# Patient Record
Sex: MALE | Race: WHITE | NOT HISPANIC OR LATINO | Employment: OTHER | URBAN - METROPOLITAN AREA
[De-identification: names, ages, dates, MRNs, and addresses within clinical notes are randomized per-mention and may not be internally consistent; named-entity substitution may affect disease eponyms.]

---

## 2017-01-02 ENCOUNTER — APPOINTMENT (OUTPATIENT)
Dept: CT IMAGING | Facility: HOSPITAL | Age: 52
End: 2017-01-02

## 2017-01-02 ENCOUNTER — APPOINTMENT (OUTPATIENT)
Dept: CARDIOLOGY | Facility: HOSPITAL | Age: 52
End: 2017-01-02
Attending: HOSPITALIST

## 2017-01-02 ENCOUNTER — HOSPITAL ENCOUNTER (OUTPATIENT)
Facility: HOSPITAL | Age: 52
Setting detail: OBSERVATION
LOS: 1 days | Discharge: HOME | End: 2017-01-03
Attending: EMERGENCY MEDICINE | Admitting: HOSPITALIST

## 2017-01-02 ENCOUNTER — APPOINTMENT (OUTPATIENT)
Dept: GENERAL RADIOLOGY | Facility: HOSPITAL | Age: 52
End: 2017-01-02

## 2017-01-02 DIAGNOSIS — I26.99 BILATERAL PULMONARY EMBOLISM (HCC): Primary | ICD-10-CM

## 2017-01-02 LAB
ALBUMIN SERPL-MCNC: 4.1 G/DL (ref 3.5–5.2)
ALBUMIN/GLOB SERPL: 1.3 G/DL
ALP SERPL-CCNC: 115 U/L (ref 39–117)
ALT SERPL W P-5'-P-CCNC: 30 U/L (ref 1–41)
ANION GAP SERPL CALCULATED.3IONS-SCNC: 13.6 MMOL/L
AST SERPL-CCNC: 19 U/L (ref 1–40)
BASOPHILS # BLD AUTO: 0.05 10*3/MM3 (ref 0–0.2)
BASOPHILS NFR BLD AUTO: 0.4 % (ref 0–1.5)
BILIRUB SERPL-MCNC: 1.3 MG/DL (ref 0.1–1.2)
BUN BLD-MCNC: 14 MG/DL (ref 6–20)
BUN/CREAT SERPL: 15.1 (ref 7–25)
CALCIUM SPEC-SCNC: 9.4 MG/DL (ref 8.6–10.5)
CHLORIDE SERPL-SCNC: 98 MMOL/L (ref 98–107)
CO2 SERPL-SCNC: 26.4 MMOL/L (ref 22–29)
CREAT BLD-MCNC: 0.93 MG/DL (ref 0.76–1.27)
D DIMER PPP FEU-MCNC: 2.42 MCGFEU/ML (ref 0–0.49)
DEPRECATED RDW RBC AUTO: 42.8 FL (ref 37–54)
EOSINOPHIL # BLD AUTO: 0.25 10*3/MM3 (ref 0–0.7)
EOSINOPHIL NFR BLD AUTO: 2 % (ref 0.3–6.2)
ERYTHROCYTE [DISTWIDTH] IN BLOOD BY AUTOMATED COUNT: 12.8 % (ref 11.5–14.5)
GFR SERPL CREATININE-BSD FRML MDRD: 85 ML/MIN/1.73
GLOBULIN UR ELPH-MCNC: 3.1 GM/DL
GLUCOSE BLD-MCNC: 102 MG/DL (ref 65–99)
HCT VFR BLD AUTO: 47.6 % (ref 40.4–52.2)
HGB BLD-MCNC: 16.2 G/DL (ref 13.7–17.6)
HOLD SPECIMEN: NORMAL
HOLD SPECIMEN: NORMAL
IMM GRANULOCYTES # BLD: 0.11 10*3/MM3 (ref 0–0.03)
IMM GRANULOCYTES NFR BLD: 0.9 % (ref 0–0.5)
LYMPHOCYTES # BLD AUTO: 2.03 10*3/MM3 (ref 0.9–4.8)
LYMPHOCYTES NFR BLD AUTO: 16.1 % (ref 19.6–45.3)
MCH RBC QN AUTO: 31.3 PG (ref 27–32.7)
MCHC RBC AUTO-ENTMCNC: 34 G/DL (ref 32.6–36.4)
MCV RBC AUTO: 91.9 FL (ref 79.8–96.2)
MONOCYTES # BLD AUTO: 0.99 10*3/MM3 (ref 0.2–1.2)
MONOCYTES NFR BLD AUTO: 7.8 % (ref 5–12)
NEUTROPHILS # BLD AUTO: 9.21 10*3/MM3 (ref 1.9–8.1)
NEUTROPHILS NFR BLD AUTO: 72.8 % (ref 42.7–76)
NRBC BLD MANUAL-RTO: 0 /100 WBC (ref 0–0)
NT-PROBNP SERPL-MCNC: 21.8 PG/ML (ref 0–900)
PLATELET # BLD AUTO: 189 10*3/MM3 (ref 140–500)
PMV BLD AUTO: 9.7 FL (ref 6–12)
POTASSIUM BLD-SCNC: 3.9 MMOL/L (ref 3.5–5.2)
PROT SERPL-MCNC: 7.2 G/DL (ref 6–8.5)
RBC # BLD AUTO: 5.18 10*6/MM3 (ref 4.6–6)
SODIUM BLD-SCNC: 138 MMOL/L (ref 136–145)
TROPONIN T SERPL-MCNC: <0.01 NG/ML (ref 0–0.03)
WBC NRBC COR # BLD: 12.64 10*3/MM3 (ref 4.5–10.7)
WHOLE BLOOD HOLD SPECIMEN: NORMAL
WHOLE BLOOD HOLD SPECIMEN: NORMAL

## 2017-01-02 PROCEDURE — 84484 ASSAY OF TROPONIN QUANT: CPT | Performed by: EMERGENCY MEDICINE

## 2017-01-02 PROCEDURE — 80053 COMPREHEN METABOLIC PANEL: CPT | Performed by: EMERGENCY MEDICINE

## 2017-01-02 PROCEDURE — 71020 HC CHEST PA AND LATERAL: CPT

## 2017-01-02 PROCEDURE — G0378 HOSPITAL OBSERVATION PER HR: HCPCS

## 2017-01-02 PROCEDURE — 85025 COMPLETE CBC W/AUTO DIFF WBC: CPT | Performed by: EMERGENCY MEDICINE

## 2017-01-02 PROCEDURE — 83880 ASSAY OF NATRIURETIC PEPTIDE: CPT | Performed by: EMERGENCY MEDICINE

## 2017-01-02 PROCEDURE — 71275 CT ANGIOGRAPHY CHEST: CPT

## 2017-01-02 PROCEDURE — 99285 EMERGENCY DEPT VISIT HI MDM: CPT

## 2017-01-02 PROCEDURE — 0 IOPAMIDOL PER 1 ML: Performed by: EMERGENCY MEDICINE

## 2017-01-02 PROCEDURE — 93970 EXTREMITY STUDY: CPT

## 2017-01-02 PROCEDURE — 93005 ELECTROCARDIOGRAM TRACING: CPT

## 2017-01-02 PROCEDURE — 85379 FIBRIN DEGRADATION QUANT: CPT | Performed by: EMERGENCY MEDICINE

## 2017-01-02 PROCEDURE — 93010 ELECTROCARDIOGRAM REPORT: CPT | Performed by: INTERNAL MEDICINE

## 2017-01-02 PROCEDURE — 25010000002 KETOROLAC TROMETHAMINE PER 15 MG: Performed by: EMERGENCY MEDICINE

## 2017-01-02 PROCEDURE — 96374 THER/PROPH/DIAG INJ IV PUSH: CPT

## 2017-01-02 RX ORDER — ACETAMINOPHEN 325 MG/1
650 TABLET ORAL EVERY 4 HOURS PRN
Status: DISCONTINUED | OUTPATIENT
Start: 2017-01-02 | End: 2017-01-03 | Stop reason: HOSPADM

## 2017-01-02 RX ORDER — CYCLOBENZAPRINE HCL 10 MG
10 TABLET ORAL 3 TIMES DAILY PRN
Status: DISCONTINUED | OUTPATIENT
Start: 2017-01-02 | End: 2017-01-03 | Stop reason: HOSPADM

## 2017-01-02 RX ORDER — ONDANSETRON 4 MG/1
4 TABLET, FILM COATED ORAL EVERY 6 HOURS PRN
Status: DISCONTINUED | OUTPATIENT
Start: 2017-01-02 | End: 2017-01-03 | Stop reason: HOSPADM

## 2017-01-02 RX ORDER — SODIUM CHLORIDE 0.9 % (FLUSH) 0.9 %
1-10 SYRINGE (ML) INJECTION AS NEEDED
Status: DISCONTINUED | OUTPATIENT
Start: 2017-01-02 | End: 2017-01-03 | Stop reason: HOSPADM

## 2017-01-02 RX ORDER — CYCLOBENZAPRINE HCL 10 MG
10 TABLET ORAL 3 TIMES DAILY PRN
COMMUNITY
End: 2018-10-30

## 2017-01-02 RX ORDER — SODIUM CHLORIDE 0.9 % (FLUSH) 0.9 %
10 SYRINGE (ML) INJECTION AS NEEDED
Status: DISCONTINUED | OUTPATIENT
Start: 2017-01-02 | End: 2017-01-03 | Stop reason: HOSPADM

## 2017-01-02 RX ORDER — HYDROCODONE BITARTRATE AND ACETAMINOPHEN 10; 325 MG/1; MG/1
1 TABLET ORAL EVERY 6 HOURS PRN
Status: DISCONTINUED | OUTPATIENT
Start: 2017-01-02 | End: 2017-01-03 | Stop reason: HOSPADM

## 2017-01-02 RX ORDER — HYDROCODONE BITARTRATE AND ACETAMINOPHEN 10; 325 MG/1; MG/1
1 TABLET ORAL EVERY 6 HOURS PRN
COMMUNITY
End: 2018-10-30

## 2017-01-02 RX ORDER — ONDANSETRON 2 MG/ML
4 INJECTION INTRAMUSCULAR; INTRAVENOUS EVERY 6 HOURS PRN
Status: DISCONTINUED | OUTPATIENT
Start: 2017-01-02 | End: 2017-01-03 | Stop reason: HOSPADM

## 2017-01-02 RX ORDER — KETOROLAC TROMETHAMINE 30 MG/ML
30 INJECTION, SOLUTION INTRAMUSCULAR; INTRAVENOUS ONCE
Status: COMPLETED | OUTPATIENT
Start: 2017-01-02 | End: 2017-01-02

## 2017-01-02 RX ORDER — ONDANSETRON 4 MG/1
4 TABLET, ORALLY DISINTEGRATING ORAL EVERY 6 HOURS PRN
Status: DISCONTINUED | OUTPATIENT
Start: 2017-01-02 | End: 2017-01-03 | Stop reason: HOSPADM

## 2017-01-02 RX ORDER — NITROGLYCERIN 0.4 MG/1
0.4 TABLET SUBLINGUAL
Status: DISCONTINUED | OUTPATIENT
Start: 2017-01-02 | End: 2017-01-03 | Stop reason: HOSPADM

## 2017-01-02 RX ADMIN — IOPAMIDOL 95 ML: 755 INJECTION, SOLUTION INTRAVENOUS at 10:44

## 2017-01-02 RX ADMIN — KETOROLAC TROMETHAMINE 30 MG: 30 INJECTION, SOLUTION INTRAMUSCULAR at 09:33

## 2017-01-02 RX ADMIN — RIVAROXABAN 20 MG: 20 TABLET, FILM COATED ORAL at 11:47

## 2017-01-02 NOTE — ED NOTES
Pt comments,complaints,concerns addressed at this time  Pt in bed with side rail up  Call light within reach  Room and pt assessed for safety concerns  Pt resting, NAD ATT  Pt informed/updated of status  Vitals assessed, continue to monitor       Yanet Boykin RN  01/02/17 4264

## 2017-01-02 NOTE — ED NOTES
PT IS A HOLD AT THIS TIME, NO ADMIT ORDERS AT THIS TIME, PT PLACED IN HOSPTIAL BED     Yanet Boykin RN  01/02/17 1241       Yanet Boykin RN  01/02/17 1241

## 2017-01-02 NOTE — ED NOTES
Patient to er from home with c/o left rib pain with deep breathing, started three days ago and getting worse.      Flip Reyes RN  01/02/17 0803

## 2017-01-02 NOTE — ED NOTES
Pt states pain in left rib cage/left side that started Friday, not any better, denies SOA or other complaints   Pt states left shoulder surgery on nov 29     Yanet Boykin RN  01/02/17 0935       Yanet Boykin RN  01/02/17 0937

## 2017-01-02 NOTE — ED PROVIDER NOTES
EMERGENCY DEPARTMENT ENCOUNTER    CHIEF COMPLAINT  Chief Complaint: Chest wall pain  History given by: Pt  History limited by: N/A  Room Number: 31/31  PMD: Raeann Becerra MD      HPI:  Pt is a 51 y.o. male who presents complaining of worsening constant L sided chest wall pain onset 3 days ago, which worsens with deep breathing and certain movements. He is concerned for fractured ribs. Pt reports L shoulder surgery performed by Dr. Duvall on Nov 29th with ongoing leg swelling since that time. He denies smoking. Pt denies vomiting, diarrhea, and SOB.    Duration:  3 days  Onset: Gradual  Timing: Constant  Location: L chest wall  Radiation: None  Quality: Aching  Intensity/Severity: Moderate  Progression: Worsening  Associated Symptoms: None  Aggravating Factors: Deep breathing, certain movements  Alleviating Factors: Nothing  Previous Episodes: None specified  Treatment before arrival: None specified    PAST MEDICAL HISTORY  Active Ambulatory Problems     Diagnosis Date Noted   • No Active Ambulatory Problems     Resolved Ambulatory Problems     Diagnosis Date Noted   • No Resolved Ambulatory Problems     No Additional Past Medical History       PAST SURGICAL HISTORY  Past Surgical History   Procedure Laterality Date   • Shoulder surgery Left    • Cholecystectomy         FAMILY HISTORY  History reviewed. No pertinent family history.    SOCIAL HISTORY  Social History     Social History   • Marital status: Single     Spouse name: N/A   • Number of children: N/A   • Years of education: N/A     Occupational History   • Not on file.     Social History Main Topics   • Smoking status: Never Smoker   • Smokeless tobacco: Not on file   • Alcohol use No   • Drug use: No   • Sexual activity: Not on file     Other Topics Concern   • Not on file     Social History Narrative   • No narrative on file       ALLERGIES  Review of patient's allergies indicates no known allergies.    REVIEW OF SYSTEMS  Review of Systems    Constitutional: Negative for chills and fever.   HENT: Negative for sore throat and trouble swallowing.    Eyes: Negative for visual disturbance.   Respiratory: Negative for cough and shortness of breath.    Cardiovascular: Positive for chest pain (L chest wall pain). Negative for leg swelling.   Gastrointestinal: Negative for abdominal pain, diarrhea, nausea and vomiting.   Endocrine: Negative.    Genitourinary: Negative for decreased urine volume and frequency.   Musculoskeletal: Negative for neck pain.   Skin: Negative for rash.   Allergic/Immunologic: Negative.    Neurological: Negative for weakness and numbness.   Hematological: Negative.    Psychiatric/Behavioral: Negative.    All other systems reviewed and are negative.      PHYSICAL EXAM  ED Triage Vitals   Temp Heart Rate Resp BP SpO2   01/02/17 0740 01/02/17 0740 01/02/17 0740 01/02/17 0803 01/02/17 0740   99 °F (37.2 °C) 83 16 138/92 99 %      Temp src Heart Rate Source Patient Position BP Location FiO2 (%)   01/02/17 0740 01/02/17 0740 01/02/17 0803 -- --   Tympanic Monitor Sitting         Physical Exam   Constitutional: He is oriented to person, place, and time and well-developed, well-nourished, and in no distress.   HENT:   Head: Normocephalic and atraumatic.   Eyes: EOM are normal. Pupils are equal, round, and reactive to light.   Neck: Normal range of motion. Neck supple.   Cardiovascular: Normal rate, regular rhythm and normal heart sounds.    Pulmonary/Chest: Effort normal and breath sounds normal. No respiratory distress.   Abdominal: Soft. There is no tenderness. There is no rebound and no guarding.   Musculoskeletal: Normal range of motion. He exhibits edema (1+ edema to bilateral lower extremities).   Pt is post-op L shoulder, he has pain with movement   Neurological: He is alert and oriented to person, place, and time. He has normal sensation and normal strength.   Skin: Skin is warm and dry.   Psychiatric: Mood and affect normal.   Nursing  note and vitals reviewed.      LAB RESULTS  Lab Results (last 24 hours)     Procedure Component Value Units Date/Time    CBC & Differential [[id number]] Collected:  01/02/17 0932    Specimen:  Blood Updated:  01/02/17 1023    Narrative:       The following orders were created for panel order CBC & Differential.  Procedure                               Abnormality         Status                     ---------                               -----------         ------                     CBC Auto Differential[[id number]]         Abnormal            Final result                 Please view results for these tests on the individual orders.    Comprehensive Metabolic Panel [[id number]]  (Abnormal) Collected:  01/02/17 0932    Specimen:  Blood Updated:  01/02/17 1011     Glucose 102 (H) mg/dL      BUN 14 mg/dL      Creatinine 0.93 mg/dL      Sodium 138 mmol/L      Potassium 3.9 mmol/L      Chloride 98 mmol/L      CO2 26.4 mmol/L      Calcium 9.4 mg/dL      Total Protein 7.2 g/dL      Albumin 4.10 g/dL      ALT (SGPT) 30 U/L      AST (SGOT) 19 U/L      Alkaline Phosphatase 115 U/L      Total Bilirubin 1.3 (H) mg/dL      eGFR Non African Amer 85 mL/min/1.73      Globulin 3.1 gm/dL      A/G Ratio 1.3 g/dL      BUN/Creatinine Ratio 15.1      Anion Gap 13.6 mmol/L     BNP [[id number]]  (Normal) Collected:  01/02/17 0932    Specimen:  Blood Updated:  01/02/17 1009     proBNP 21.8 pg/mL     Narrative:       Among patients with dyspnea, NT-proBNP is highly sensitive for the detection of acute congestive heart failure. In addition NT-proBNP of <300 pg/ml effectively rules out acute congestive heart failure with 99% negative predictive value.    Troponin [[id number]]  (Normal) Collected:  01/02/17 0932    Specimen:  Blood Updated:  01/02/17 1011     Troponin T <0.010 ng/mL     Narrative:       Troponin T Reference Ranges:  Less than 0.03 ng/mL:    Negative for AMI  0.03 to 0.09 ng/mL:      Indeterminant for AMI  Greater than 0.09 ng/mL:  Positive for AMI    CBC Auto Differential [[id number]]  (Abnormal) Collected:  01/02/17 0932    Specimen:  Blood Updated:  01/02/17 1023     WBC 12.64 (H) 10*3/mm3      RBC 5.18 10*6/mm3      Hemoglobin 16.2 g/dL      Hematocrit 47.6 %      MCV 91.9 fL      MCH 31.3 pg      MCHC 34.0 g/dL      RDW 12.8 %      RDW-SD 42.8 fl      MPV 9.7 fL      Platelets 189 10*3/mm3      Neutrophil % 72.8 %      Lymphocyte % 16.1 (L) %      Monocyte % 7.8 %      Eosinophil % 2.0 %      Basophil % 0.4 %      Immature Grans % 0.9 (H) %      Neutrophils, Absolute 9.21 (H) 10*3/mm3      Lymphocytes, Absolute 2.03 10*3/mm3      Monocytes, Absolute 0.99 10*3/mm3      Eosinophils, Absolute 0.25 10*3/mm3      Basophils, Absolute 0.05 10*3/mm3      Immature Grans, Absolute 0.11 (H) 10*3/mm3      nRBC 0.0 /100 WBC     D-dimer, Quantitative [[id number]]  (Abnormal) Collected:  01/02/17 0932    Specimen:  Blood Updated:  01/02/17 1003     D-Dimer, Quantitative 2.42 (H) MCGFEU/mL           I ordered the above labs and reviewed the results    RADIOLOGY  XR Chest 2 View    (Results Pending)   CT Angiogram Chest With Contrast    (Results Pending)   CTA Chest:  Shows bilateral PEs. RV to LV ratio less than 1     I ordered the above noted radiological studies. Interpreted by radiologist. Discussed with radiologist (Dr. Braswell).     EKG         EKG time: 7:47 AM  Rhythm/Rate: NSR at 89 bpm  P waves and OH: Normal  QRS, axis: Normal  ST and T waves: Normal  Interpreted contemporaneously by me and independently viewed.  Unchanged compared to prior (12/29/04).    PROCEDURES  Procedures      PROGRESS AND CONSULTS  ED Course   Comment By Time   1:03 PM  [facility] [place] so unable to update until now.  Patient with pleuritic chest pain.  Has bilateral PEs.  Does not appear submassive as RV/LV ratio is normal and biomarkers are normal.  Discussed with Dr. Brody who will admit. Nilson Flynn MD 01/02 1304   9:01 AM:  Vitals: BP: 138/92 HR: 83 Temp: 99 °F  (37.2 °C) (Tympanic) O2 sat: 99%  D/w pt plan for labs and CXR for further evaluation. Discussed results of EKG, which was normal and unchanged from 2004. Ordered Toradol for pain management. Pt understands and agrees with the plan, all questions answered.    10:30 AM:  Ordered CTA Chest for further evaluation.    10:50 AM:  Vitals: BP: 138/92 HR: 81 Temp: 99 °F (37.2 °C) (Tympanic) O2 sat: 91%  Rechecked pt. Pt is resting comfortably. Discussed results of imaging, which showed bilateral PEs, and the need for admission for further care. Pt understands and agrees with the plan, all questions answered.    11:19 AM:  Discussed pt's case with Dr. Brody ([facility]), who agrees to admit the pt for further care.    EPIC is not functioning properly. I am unable to view imaging and labs.    MEDICAL DECISION MAKING  Results were reviewed/discussed with the patient and they were also made aware of online access. Pt also made aware that some labs, such as cultures, will not be resulted during ER visit and follow up with PMD is necessary.     MDM  Number of Diagnoses or Management Options     Amount and/or Complexity of Data Reviewed  Clinical lab tests: ordered and reviewed  Tests in the radiology section of CPT®: ordered and reviewed  Tests in the medicine section of CPT®: ordered and reviewed  Discussion of test results with the performing providers: yes  Discuss the patient with other providers: yes  Independent visualization of images, tracings, or specimens: yes    Patient Progress  Patient progress: stable         DIAGNOSIS  Final diagnoses:   Bilateral pulmonary embolism       DISPOSITION  ADMISSION    Discussed treatment plan and reason for admission with pt/family and admitting physician.  Pt/family voiced understanding of the plan for admission for further testing/treatment as needed.     Latest Documented Vital Signs:  As of 12:06 PM  BP- 121/90 HR- 80 Temp- 99 °F (37.2 °C) (Tympanic) O2 sat- 97%    --  Documentation  assistance provided by roxana Mckay for Dr. Flynn.  Information recorded by the scribe was done at my direction and has been verified and validated by me.     Yuan Mckay  01/02/17 1561       Nilson Flynn MD  01/02/17 0170

## 2017-01-02 NOTE — IP AVS SNAPSHOT
AFTER VISIT SUMMARY             Oliverio Corrales           About your hospitalization     You were admitted on:  January 2, 2017 You last received care in the:  [address]       Procedures & Surgeries         Medications    If you or your caregiver advised us that you are currently taking a medication and that medication is marked below as “Resume”, this simply indicates that we have reviewed those medications to make sure our new therapy recommendations do not interfere.  If you have concerns about medications other than those new ones which we are prescribing today, please consult the physician who prescribed them (or your primary physician).  Our review of your home medications is not meant to indicate that we are directing their use.             Your Medications      START taking these medications     rivaroxaban 15 MG tablet   Take 1 tablet by mouth 2 (Two) Times a Day With Meals for 20 days.   Last time this was given:  1/3/2017  8:28 AM   Commonly known as:  XARELTO           rivaroxaban 20 MG tablet   Take 1 tablet by mouth Daily With Dinner.   Last time this was given:  1/3/2017  8:28 AM   Commonly known as:  XARELTO   Start taking on:  1/23/2017   Notes to Patient:  Start taking this on January 23             CONTINUE taking these medications     cyclobenzaprine 10 MG tablet   Take 10 mg by mouth 3 (Three) Times a Day As Needed for muscle spasms.   Last time this was given:  1/3/2017 12:33 AM   Commonly known as:  FLEXERIL           HYDROcodone-acetaminophen  MG per tablet   Take 1 tablet by mouth Every 6 (Six) Hours As Needed for moderate pain (4-6).   Last time this was given:  1/3/2017 12:33 AM   Commonly known as:  NORCO                Where to Get Your Medications      You can get these medications from any pharmacy     Bring a paper prescription for each of these medications     rivaroxaban 15 MG tablet    rivaroxaban 20 MG tablet                  Your Medications         Your Medication List           Morning Noon Evening Bedtime As Needed    cyclobenzaprine 10 MG tablet   Take 10 mg by mouth 3 (Three) Times a Day As Needed for muscle spasms.   Commonly known as:  FLEXERIL                                   HYDROcodone-acetaminophen  MG per tablet   Take 1 tablet by mouth Every 6 (Six) Hours As Needed for moderate pain (4-6).   Commonly known as:  NORCO                                   * rivaroxaban 15 MG tablet   Take 1 tablet by mouth 2 (Two) Times a Day With Meals for 20 days.   Commonly known as:  XARELTO                       Take this evening               * rivaroxaban 20 MG tablet   Take 1 tablet by mouth Daily With Dinner.   Commonly known as:  XARELTO   Start taking on:  1/23/2017   Notes to Patient:  Start taking this on January 23                                * Notice:  This list has 2 medication(s) that are the same as other medications prescribed for you. Read the directions carefully, and ask your doctor or other care provider to review them with you.             Instructions for After Discharge        Activity Instructions     Activity as Tolerated                 Diet Instructions     Diet: Regular; Thin Liquids, No Restrictions       Discharge Diet:  Regular   Fluid Consistency:  Thin Liquids, No Restrictions             Discharge References/Attachments     RIVAROXABAN ORAL TABLETS (ENGLISH)    PULMONARY EMBOLISM (ENGLISH)    DEEP VEIN THROMBOSIS (ENGLISH)       Follow-ups for After Discharge        Follow-up Information     Follow up with Raeann Becerra MD. Schedule an appointment as soon as possible for a visit in 1 week(s).    Specialty:  Family Medicine    Contact information:    [facility] [address] [address] [phone]        Referrals and Follow-ups to Schedule     Follow-Up    As directed    Keep Orthopedic follow up.   Follow Up Details:  PCP in 1 week.             Michael[facility] Signup     [facility] allows you  to send messages to your doctor, view your test results, renew your prescriptions, schedule appointments, and more. To sign up, go to [email or web address] and click on the Sign Up Now link in the New User? box. Enter your [employer] Activation Code exactly as it appears below along with the last four digits of your Social Security Number and your Date of Birth () to complete the sign-up process. If you do not sign up before the expiration date, you must request a new code.    [employer] Activation Code: [email or web address]  Expires: 2017  8:05 AM    If you have questions, you can email [email or web address] or call [phone] to talk to our [employer] staff. Remember, [employer] is NOT to be used for urgent needs. For medical emergencies, dial 911.           Summary of Your Hospitalization        Reason for Hospitalization     Your primary diagnosis was:  Not on File    Your diagnoses also included:  Blood Clots In Lungs      Care Providers     Provider Service Role Specialty    Alexys Brody MD Internal Medicine Attending Provider Internal Medicine      Your Allergies  Date Reviewed: 2017    No active allergies      Patient Belongings Returned     Document Return of Belongings Flowsheet     Were the patient bedside belongings sent home?   N/A   Belongings Retrieved from Security & Sent Home   N/A    Belongings Sent to Safe   --   Medications Retrieved from Pharmacy & Sent Home   N/A              More Information      Rivaroxaban oral tablets  What is this medicine?  RIVAROXABAN (ri va BENNY a ban) is an anticoagulant (blood thinner). It is used to treat blood clots in the lungs or in the veins. It is also used after knee or hip surgeries to prevent blood clots. It is also used to lower the chance of stroke in people with a medical condition called atrial fibrillation.  This medicine may be used for other purposes; ask your health care provider or pharmacist if you have questions.  What should I tell  my health care provider before I take this medicine?  They need to know if you have any of these conditions:  -bleeding disorders  -bleeding in the brain  -blood in your stools (black or tarry stools) or if you have blood in your vomit  -history of stomach bleeding  -kidney disease  -liver disease  -low blood counts, like low white cell, platelet, or red cell counts  -recent or planned spinal or epidural procedure  -take medicines that treat or prevent blood clots  -an unusual or allergic reaction to rivaroxaban, other medicines, foods, dyes, or preservatives  -pregnant or trying to get pregnant  -breast-feeding  How should I use this medicine?  Take this medicine by mouth with a glass of water. Follow the directions on the prescription label. Take your medicine at regular intervals. Do not take it more often than directed. Do not stop taking except on your doctor's advice. Stopping this medicine may increase your risk of a blood clot. Be sure to refill your prescription before you run out of medicine.  If you are taking this medicine after hip or knee replacement surgery, take it with or without food. If you are taking this medicine for atrial fibrillation, take it with your evening meal. If you are taking this medicine to treat blood clots, take it with food at the same time each day. If you are unable to swallow your tablet, you may crush the tablet and mix it in applesauce. Then, immediately eat the applesauce. You should eat more food right after you eat the applesauce containing the crushed tablet.  Talk to your pediatrician regarding the use of this medicine in children. Special care may be needed.  Overdosage: If you think you have taken too much of this medicine contact a poison control center or emergency room at once.  NOTE: This medicine is only for you. Do not share this medicine with others.  What if I miss a dose?  If you take your medicine once a day and miss a dose, take the missed dose as soon as  you remember. If you take your medicine twice a day and miss a dose, take the missed dose immediately. In this instance, 2 tablets may be taken at the same time. The next day you should take 1 tablet twice a day as directed.  What may interact with this medicine?  -aspirin and aspirin-like medicines  -certain antibiotics like erythromycin, azithromycin, and clarithromycin  -certain medicines for fungal infections like ketoconazole and itraconazole  -certain medicines for irregular heart beat like amiodarone, quinidine, dronedarone  -certain medicines for seizures like carbamazepine, phenytoin  -certain medicines that treat or prevent blood clots like warfarin, enoxaparin, and dalteparin  -conivaptan  -diltiazem  -felodipine  -indinavir  -lopinavir; ritonavir  -NSAIDS, medicines for pain and inflammation, like ibuprofen or naproxen  -ranolazine  -rifampin  -ritonavir  -Andrew's wort  -verapamil  This list may not describe all possible interactions. Give your health care provider a list of all the medicines, herbs, non-prescription drugs, or dietary supplements you use. Also tell them if you smoke, drink alcohol, or use illegal drugs. Some items may interact with your medicine.  What should I watch for while using this medicine?  Visit your doctor or health care professional for regular checks on your progress. Your condition will be monitored carefully while you are receiving this medicine.  Notify your doctor or health care professional and seek emergency treatment if you develop breathing problems; changes in vision; chest pain; severe, sudden headache; pain, swelling, warmth in the leg; trouble speaking; sudden numbness or weakness of the face, arm, or leg. These can be signs that your condition has gotten worse.  If you are going to have surgery, tell your doctor or health care professional that you are taking this medicine.  Tell your health care professional that you use this medicine before you have a spinal  or epidural procedure. Sometimes people who take this medicine have bleeding problems around the spine when they have a spinal or epidural procedure. This bleeding is very rare. If you have a spinal or epidural procedure while on this medicine, call your health care professional immediately if you have back pain, numbness or tingling (especially in your legs and feet), muscle weakness, paralysis, or loss of bladder or bowel control.  Avoid sports and activities that might cause injury while you are using this medicine. Severe falls or injuries can cause unseen bleeding. Be careful when using sharp tools or knives. Consider using an electric razor. Take special care brushing or flossing your teeth. Report any injuries, bruising, or red spots on the skin to your doctor or health care professional.  What side effects may I notice from receiving this medicine?  Side effects that you should report to your doctor or health care professional as soon as possible:  -allergic reactions like skin rash, itching or hives, swelling of the face, lips, or tongue  -back pain  -redness, blistering, peeling or loosening of the skin, including inside the mouth  -signs and symptoms of bleeding such as bloody or black, tarry stools; red or dark-brown urine; spitting up blood or brown material that looks like coffee grounds; red spots on the skin; unusual bruising or bleeding from the eye, gums, or nose  Side effects that usually do not require medical attention (Report these to your doctor or health care professional if they continue or are bothersome.):  -dizziness  -muscle pain  This list may not describe all possible side effects. Call your doctor for medical advice about side effects. You may report side effects to FDA at 2-557-FDA-0479.  Where should I keep my medicine?  Keep out of the reach of children.  Store at room temperature between 15 and 30 degrees C (59 and 86 degrees F). Throw away any unused medicine after the expiration  date.  NOTE: This sheet is a summary. It may not cover all possible information. If you have questions about this medicine, talk to your doctor, pharmacist, or health care provider.     © 2016, [employer]. (2015-12-16 12:45:34)          Pulmonary Embolism  A pulmonary embolism (PE) is a sudden blockage or decrease of blood flow in one lung or both lungs. Most blockages come from a blood clot that travels from the legs or the pelvis to the lungs. PE is a dangerous and potentially life-threatening condition if it is not treated right away.  CAUSES  A pulmonary embolism occurs most commonly when a blood clot travels from one of your veins to your lungs. Rarely, PE is caused by air, fat, amniotic fluid, or part of a tumor traveling through your veins to your lungs.  RISK FACTORS  A PE is more likely to develop in:  · People who smoke.  · People who are older, especially over 60 years of age.  · People who are overweight (obese).  · People who sit or lie still for a long time, such as during long-distance travel (over 4 hours), bed rest, hospitalization, or during recovery from certain medical conditions like a stroke.  · People who do not engage in much physical activity (sedentary lifestyle).  · People who have chronic breathing disorders.  · People who have a personal or family history of blood clots or blood clotting disease.  · People who have peripheral vascular disease (PVD), diabetes, or some types of cancer.  · People who have heart disease, especially if the person had a recent heart attack or has congestive heart failure.  · People who have neurological diseases that affect the legs (leg paresis).  · People who have had a traumatic injury, such as breaking a hip or leg.  · People who have recently had major or lengthy surgery, especially on the hip, knee, or abdomen.  · People who have had a central line placed inside a large vein.  · People who take medicines that contain the hormone estrogen.  These include birth control pills and hormone replacement therapy.  · Pregnancy or during childbirth or the postpartum period.  SIGNS AND SYMPTOMS   The symptoms of a PE usually start suddenly and include:  · Shortness of breath while active or at rest.  · Coughing or coughing up blood or blood-tinged mucus.  · Chest pain that is often worse with deep breaths.  · Rapid or irregular heartbeat.  · Feeling light-headed or dizzy.  · Fainting.  · Feeling anxious.  · Sweating.  There may also be pain and swelling in a leg if that is where the blood clot started.  These symptoms may represent a serious problem that is an emergency. Do not wait to see if the symptoms will go away. Get medical help right away. Call your local emergency services (911 in the [place] Do not drive yourself to the hospital.  DIAGNOSIS  Your health care provider will take a medical history and perform a physical exam. You may also have other tests, including:  · Blood tests to assess the clotting properties of your blood, assess oxygen levels in your blood, and find blood clots.  · Imaging tests, such as CT, ultrasound, MRI, X-ray, and other tests to see if you have clots anywhere in your body.  · An electrocardiogram (ECG) to look for heart strain from blood clots in the lungs.  TREATMENT  The main goals of PE treatment are:  · To stop a blood clot from growing larger.  · To stop new blood clots from forming.  The type of treatment that you receive depends on many factors, such as the cause of your PE, your risk for bleeding or developing more clots, and other medical conditions that you have. Sometimes, a combination of treatments is necessary.  This condition may be treated with:  · Medicines, including newer oral blood thinners (anticoagulants), warfarin, low molecular weight heparins, thrombolytics, or heparins.  · Wearing compression stockings or using different types of devices.  · Surgery (rare) to remove the blood clot or to place a filter  in your abdomen to stop the blood clot from traveling to your lungs.  Treatments for a PE are often divided into immediate treatment, long-term treatment (up to 3 months after PE), and extended treatment (more than 3 months after PE). Your treatment may continue for several months. This is called maintenance therapy, and it is used to prevent the forming of new blood clots. You can work with your health care provider to choose the treatment program that is best for you.  What are anticoagulants?   Anticoagulants are medicines that treat PEs. They can stop current blood clots from growing and stop new clots from forming. They cannot dissolve existing clots. Your body dissolves clots by itself over time. Anticoagulants are given by mouth, by injection, or through an IV tube.  What are thrombolytics?   Thrombolytics are clot-dissolving medicines that are used to dissolve a PE. They carry a high risk of bleeding, so they tend to be used only in severe cases or if you have very low blood pressure.  HOME CARE INSTRUCTIONS  If you are taking a newer oral anticoagulant:   · Take the medicine every single day at the same time each day.  · Understand what foods and drugs interact with this medicine.  · Understand that there are no regular blood tests required when using this medicine.  · Understand the side effects of this medicine, including excessive bruising or bleeding. Ask your health care provider or pharmacist about other possible side effects.  If you are taking warfarin:  · Understand how to take warfarin and know which foods can affect how warfarin works in your body.  · Understand that it is dangerous to take too much or too little warfarin. Too much warfarin increases the risk of bleeding. Too little warfarin continues to allow the risk for blood clots.  · Follow your PT and INR blood testing schedule. The PT and INR results allow your health care provider to adjust your dose of warfarin. It is very important that  you have your PT and INR tested as often as told by your health care provider.  · Avoid major changes in your diet, or tell your health care provider before you change your diet. Arrange a visit with a registered dietitian to answer your questions. Many foods, especially foods that are high in vitamin K, can interfere with warfarin and affect the PT and INR results. Eat a consistent amount of foods that are high in vitamin K, such as:    Spinach, kale, broccoli, cabbage, pete greens, turnip greens, [place] sprouts, peas, cauliflower, seaweed, and parsley.    Beef liver and pork liver.    Green tea.    Soybean oil.  · Tell your health care provider about any and all medicines, vitamins, and supplements that you take, including aspirin and other over-the-counter anti-inflammatory medicines. Be especially cautious with aspirin and anti-inflammatory medicines. Do not take those before you ask your health care provider if it is safe to do so.  This is important because many medicines can interfere with warfarin and affect the PT and INR results.  · Do not start or stop taking any over-the-counter or prescription medicine unless your health care provider or pharmacist tells you to do so.  If you take warfarin, you will also need to do these things:  · Hold pressure over cuts for longer than usual.  · Tell your dentist and other health care providers that you are taking warfarin before you have any procedures in which bleeding may occur.  · Avoid alcohol or drink very small amounts. Tell your health care provider if you change your alcohol intake.  · Do not use tobacco products, including cigarettes, chewing tobacco, and e-cigarettes. If you need help quitting, ask your health care provider.  · Avoid contact sports.  General Instructions  · Take over-the-counter and prescription medicines only as told by your health care provider. Anticoagulant medicines can have side effects, including easy bruising and difficulty  stopping bleeding. If you are prescribed an anticoagulant, you will also need to do these things:    Hold pressure over cuts for longer than usual.    Tell your dentist and other health care providers that you are taking anticoagulants before you have any procedures in which bleeding may occur.    Avoid contact sports.  · Wear a medical alert bracelet or carry a medical alert card that says you have had a PE.  · Ask your health care provider how soon you can go back to your normal activities. Stay active to prevent new blood clots from forming.  · Make sure to exercise while traveling or when you have been sitting or standing for a long period of time. It is very important to exercise. Exercise your legs by walking or by tightening and relaxing your leg muscles often. Take frequent walks.  · Wear compression stockings as told by your health care provider to help prevent more blood clots from forming.  · Do not use tobacco products, including cigarettes, chewing tobacco, and e-cigarettes. If you need help quitting, ask your health care provider.  · Keep all follow-up appointments with your health care provider. This is important.  PREVENTION  Take these actions to decrease your risk of developing another PE:  · Exercise regularly. For at least 30 minutes every day, engage in:    Activity that involves moving your arms and legs.    Activity that encourages good blood flow through your body by increasing your heart rate.  · Exercise your arms and legs every hour during long-distance travel (over 4 hours). Drink plenty of water and avoid drinking alcohol while traveling.  · Avoid sitting or lying in bed for long periods of time without moving your legs.  · Maintain a weight that is appropriate for your height. Ask your health care provider what weight is healthy for you.  · If you are a woman who is over 35 years of age, avoid unnecessary use of medicines that contain estrogen. These include birth control pills.  · Do  not smoke, especially if you take estrogen medicines.  If you need help quitting, ask your health care provider.  · If you are at very high risk for PE, wear compression stockings.  · If you recently had a PE, have regularly scheduled ultrasound testing on your legs to check for new blood clots.  If you are hospitalized, prevention measures may include:  · Early walking after surgery, as soon as your health care provider says that it is safe.  · Receiving anticoagulants to prevent blood clots. If you cannot take anticoagulants, other options may be available, such as wearing compression stockings or using different types of devices.  SEEK IMMEDIATE MEDICAL CARE IF:  · You have new or increased pain, swelling, or redness in an arm or leg.  · You have numbness or tingling in an arm or leg.  · You have shortness of breath while active or at rest.  · You have chest pain.  · You have a rapid or irregular heartbeat.  · You feel light-headed or dizzy.  · You cough up blood.  · You notice blood in your vomit, bowel movement, or urine.  · You have a fever.  These symptoms may represent a serious problem that is an emergency. Do not wait to see if the symptoms will go away. Get medical help right away. Call your local emergency services (911 in the [place] Do not drive yourself to the hospital.     This information is not intended to replace advice given to you by your health care provider. Make sure you discuss any questions you have with your health care provider.     Document Released: 12/15/2001 Document Revised: 09/07/2016 Document Reviewed: 04/13/2016  [employer] Patient Education ©2016 [employer]          Deep Vein Thrombosis  A deep vein thrombosis (DVT) is a blood clot (thrombus) that usually occurs in a deep, larger vein of the lower leg or the pelvis, or in an upper extremity such as the arm. These are dangerous and can lead to serious and even life-threatening complications if the clot travels to the  lungs.  A DVT can damage the valves in your leg veins so that instead of flowing upward, the blood pools in the lower leg. This is called post-thrombotic syndrome, and it can result in pain, swelling, discoloration, and sores on the leg.  CAUSES  A DVT is caused by the formation of a blood clot in your leg, pelvis, or arm. Usually, several things contribute to the formation of blood clots. A clot may develop when:  · Your blood flow slows down.  · Your vein becomes damaged in some way.  · You have a condition that makes your blood clot more easily.  RISK FACTORS  A DVT is more likely to develop in:  · People who are older, especially over 60 years of age.  · People who are overweight (obese).  · People who sit or lie still for a long time, such as during long-distance travel (over 4 hours), bed rest, hospitalization, or during recovery from certain medical conditions like a stroke.  · People who do not engage in much physical activity (sedentary lifestyle).  · People who have chronic breathing disorders.  · People who have a personal or family history of blood clots or blood clotting disease.  · People who have peripheral vascular disease (PVD), diabetes, or some types of cancer.  · People who have heart disease, especially if the person had a recent heart attack or has congestive heart failure.  · People who have neurological diseases that affect the legs (leg paresis).  · People who have had a traumatic injury, such as breaking a hip or leg.  · People who have recently had major or lengthy surgery, especially on the hip, knee, or abdomen.  · People who have had a central line placed inside a large vein.  · People who take medicines that contain the hormone estrogen. These include birth control pills and hormone replacement therapy.  · Pregnancy or during childbirth or the postpartum period.  · Long plane flights (over 8 hours).  SIGNS AND SYMPTOMS  Symptoms of a DVT can include:   · Swelling of your leg or arm,  especially if one side is much worse.  · Warmth and redness of your leg or arm, especially if one side is much worse.  · Pain in your arm or leg. If the clot is in your leg, symptoms may be more noticeable or worse when you stand or walk.  · A feeling of pins and needles, if the clot is in the arm.  The symptoms of a DVT that has traveled to the lungs (pulmonary embolism, PE) usually start suddenly and include:  · Shortness of breath while active or at rest.  · Coughing or coughing up blood or blood-tinged mucus.  · Chest pain that is often worse with deep breaths.  · Rapid or irregular heartbeat.  · Feeling light-headed or dizzy.  · Fainting.  · Feeling anxious.  · Sweating.  There may also be pain and swelling in a leg if that is where the blood clot started.  These symptoms may represent a serious problem that is an emergency. Do not wait to see if the symptoms will go away. Get medical help right away. Call your local emergency services (911 in the [place] Do not drive yourself to the hospital.  DIAGNOSIS  Your health care provider will take a medical history and perform a physical exam. You may also have other tests, including:  · Blood tests to assess the clotting properties of your blood.  · Imaging tests, such as CT, ultrasound, MRI, X-ray, and other tests to see if you have clots anywhere in your body.  TREATMENT  After a DVT is identified, it can be treated. The type of treatment that you receive depends on many factors, such as the cause of your DVT, your risk for bleeding or developing more clots, and other medical conditions that you have. Sometimes, a combination of treatments is necessary. Treatment options may be combined and include:  · Monitoring the blood clot with ultrasound.  · Taking medicines by mouth, such as newer blood thinners (anticoagulants), thrombolytics, or warfarin.  · Taking anticoagulant medicine by injection or through an IV tube.  · Wearing compression stockings or using different  types of devices.  · Surgery (rare) to remove the blood clot or to place a filter in your abdomen to stop the blood clot from traveling to your lungs.  Treatments for a DVT are often divided into immediate treatment and long-term treatment (up to 3 months after DVT). You can work with your health care provider to choose the treatment program that is best for you.  HOME CARE INSTRUCTIONS  If you are taking a newer oral anticoagulant:  · Take the medicine every single day at the same time each day.  · Understand what foods and drugs interact with this medicine.  · Understand that there are no regular blood tests required when using this medicine.  · Understand the side effects of this medicine, including excessive bruising or bleeding. Ask your health care provider or pharmacist about other possible side effects.  If you are taking warfarin:  · Understand how to take warfarin and know which foods can affect how warfarin works in your body.  · Understand that it is dangerous to take too much or too little warfarin. Too much warfarin increases the risk of bleeding. Too little warfarin continues to allow the risk for blood clots.  · Follow your PT and INR blood testing schedule. The PT and INR results allow your health care provider to adjust your dose of warfarin. It is very important that you have your PT and INR tested as often as told by your health care provider.  · Avoid major changes in your diet, or tell your health care provider before you change your diet. Arrange a visit with a registered dietitian to answer your questions. Many foods, especially foods that are high in vitamin K, can interfere with warfarin and affect the PT and INR results. Eat a consistent amount of foods that are high in vitamin K, such as:    Spinach, kale, broccoli, cabbage, pete greens, turnip greens, [place] sprouts, peas, cauliflower, seaweed, and parsley.    Beef liver and pork liver.    Green tea.    Soybean oil.  · Tell your  health care provider about any and all medicines, vitamins, and supplements that you take, including aspirin and other over-the-counter anti-inflammatory medicines. Be especially cautious with aspirin and anti-inflammatory medicines. Do not take those before you ask your health care provider if it is safe to do so. This is important because many medicines can interfere with warfarin and affect the PT and INR results.  · Do not start or stop taking any over-the-counter or prescription medicine unless your health care provider or pharmacist tells you to do so.  If you take warfarin, you will also need to do these things:  · Hold pressure over cuts for longer than usual.  · Tell your dentist and other health care providers that you are taking warfarin before you have any procedures in which bleeding may occur.  · Avoid alcohol or drink very small amounts. Tell your health care provider if you change your alcohol intake.  · Do not use tobacco products, including cigarettes, chewing tobacco, and e-cigarettes. If you need help quitting, ask your health care provider.  · Avoid contact sports.  General Instructions  · Take over-the-counter and prescription medicines only as told by your health care provider. Anticoagulant medicines can have side effects, including easy bruising and difficulty stopping bleeding. If you are prescribed an anticoagulant, you will also need to do these things:    Hold pressure over cuts for longer than usual.    Tell your dentist and other health care providers that you are taking anticoagulants before you have any procedures in which bleeding may occur.    Avoid contact sports.  · Wear a medical alert bracelet or carry a medical alert card that says you have had a PE.  · Ask your health care provider how soon you can go back to your normal activities. Stay active to prevent new blood clots from forming.  · Make sure to exercise while traveling or when you have been sitting or standing for a  long period of time. It is very important to exercise. Exercise your legs by walking or by tightening and relaxing your leg muscles often. Take frequent walks.  · Wear compression stockings as told by your health care provider to help prevent more blood clots from forming.  · Do not use tobacco products, including cigarettes, chewing tobacco, and e-cigarettes. If you need help quitting, ask your health care provider.  · Keep all follow-up appointments with your health care provider. This is important.  PREVENTION  Take these actions to decrease your risk of developing another DVT:  · Exercise regularly. For at least 30 minutes every day, engage in:    Activity that involves moving your arms and legs.    Activity that encourages good blood flow through your body by increasing your heart rate.  · Exercise your arms and legs every hour during long-distance travel (over 4 hours). Drink plenty of water and avoid drinking alcohol while traveling.  · Avoid sitting or lying in bed for long periods of time without moving your legs.  · Maintain a weight that is appropriate for your height. Ask your health care provider what weight is healthy for you.  · If you are a woman who is over 35 years of age, avoid unnecessary use of medicines that contain estrogen. These include birth control pills.  · Do not smoke, especially if you take estrogen medicines. If you need help quitting, ask your health care provider.  If you are hospitalized, prevention measures may include:  · Early walking after surgery, as soon as your health care provider says that it is safe.  · Receiving anticoagulants to prevent blood clots. If you cannot take anticoagulants, other options may be available, such as wearing compression stockings or using different types of devices.  SEEK IMMEDIATE MEDICAL CARE IF:  · You have new or increased pain, swelling, or redness in an arm or leg.  · You have numbness or tingling in an arm or leg.  · You have shortness of  breath while active or at rest.  · You have chest pain.  · You have a rapid or irregular heartbeat.  · You feel light-headed or dizzy.  · You cough up blood.  · You notice blood in your vomit, bowel movement, or urine.  These symptoms may represent a serious problem that is an emergency. Do not wait to see if the symptoms will go away. Get medical help right away. Call your local emergency services (911 in the [place] Do not drive yourself to the hospital.     This information is not intended to replace advice given to you by your health care provider. Make sure you discuss any questions you have with your health care provider.     Document Released: 12/18/2006 Document Revised: 09/07/2016 Document Reviewed: 04/13/2016  [employer] Patient Education ©2016 [employer]            SYMPTOMS OF A STROKE    Call 911 or have someone take you to the Emergency Department if you have any of the following:    · Sudden numbness or weakness of your face, arm or leg especially on one side of the body  · Sudden confusion, diffiiculty speaking or trouble understanding   · Changes in your vision or loss of sight in one eye  · Sudden severe headache with no known cause  · sudden dizziness, trouble walking, loss of balance or coordination    It is important to seek emergency care right away if you have further stroke symptoms. If you get emergency help quickly, the powerful clot-dissolving medicines can reduce the disabilities caused by a stroke.     For more information:    American Stroke Association  7-058-7-STROKE  www.strokeassociation.org           IF YOU SMOKE OR USE TOBACCO PLEASE READ THE FOLLOWING:    Why is smoking bad for me?  Smoking increases the risk of heart disease, lung disease, vascular disease, stroke, and cancer.     If you smoke, STOP!    If you would like more information on quitting smoking, please visit the [employer] website: [email or web address]    This link will provide additional resources including the QUIT line and the [employer] the [employer] support groups.     For more information:    American Cancer Society  (755) 117-3627    American Heart Association  1-129.316.1604               YOU ARE THE MOST IMPORTANT FACTOR IN YOUR RECOVERY.     Follow all instructions carefully.     I have reviewed my discharge instructions with my nurse, including the following information, if applicable:     Information about my illness and diagnosis   Follow up appointments (including lab draws)   Wound Care   Equipment Needs   Medications (new and continuing) along with side effects   Preventative information such as vaccines and smoking cessations   Diet   Pain   I know when to contact my Doctor's office or seek emergency care      I want my nurse to describe the side effects of my medications: YES NO   If the answer is no, I understand the side effects of my medications: YES NO   My nurse described the side effects of my medications in a way that I could understand: YES NO   I have taken my personal belongings and my own medications with me at discharge: YES NO            I have received this information and my questions have been answered. I have discussed any concerns I see with this plan with the nurse or physician. I understand these instructions.    Signature of Patient or Responsible Person: _____________________________________    Date: _________________  Time: __________________    Signature of Healthcare Provider: _______________________________________  Date: _________________  Time: __________________

## 2017-01-02 NOTE — H&P
HISTORY AND PHYSICAL   [facility]        Patient Identification:  Name: Oliverio Corrales  Age: 51 y.o.  Sex: male  :  1965  MRN: [id number]                     Primary Care Physician: Raeann Becerra MD    Chief Complaint:  CP    History of Present Illness:   Pleasant 51-year-old gentleman who has recently undergone a left shoulder procedure.  He states around last Friday he noticed pain in the left shoulder and chest area.  It became more diffuse over the time.  It was pleuritic in nature.  He is not really sure really had any appreciable shortness of air associated with it but it certainly was not a dominant feature.  No palpitations, no lightheaded feelings.  No fever sweats or chills.  He also has been noting some lower extremity swelling off and on during about a week after his surgery.  He states he has not had issues with swelling of the legs prior to this.  There was also some transient swelling of the left upper extremity postop which has resolved.    Past Medical History:  History reviewed. No pertinent past medical history.  Past Surgical History:  Past Surgical History   Procedure Laterality Date   • Shoulder surgery Left    • Cholecystectomy        Home Meds:  Prescriptions Prior to Admission   Medication Sig Dispense Refill Last Dose   • cyclobenzaprine (FLEXERIL) 10 MG tablet Take 10 mg by mouth 3 (Three) Times a Day As Needed for muscle spasms.      • HYDROcodone-acetaminophen (NORCO)  MG per tablet Take 1 tablet by mouth Every 6 (Six) Hours As Needed for moderate pain (4-6).          Allergies:  No Known Allergies  Immunizations:    There is no immunization history on file for this patient.  Social History:   Social History     Social History Narrative   • No narrative on file     Social History   Substance Use Topics   • Smoking status: Never Smoker   • Smokeless tobacco: Not on file   • Alcohol use No     Family History:  History reviewed. No pertinent family history.      Review of Systems  Review of Systems    Objective:  tMax 24 hrs: Temp (24hrs), Av.6 °F (37 °C), Min:98.1 °F (36.7 °C), Max:99 °F (37.2 °C)    Vitals Ranges:   Temp:  [98.1 °F (36.7 °C)-99 °F (37.2 °C)] 98.1 °F (36.7 °C)  Heart Rate:  [70-83] 74  Resp:  [14-16] 14  BP: (119-138)/(84-93) 135/93      Exam:  Physical Exam   Constitutional: He is oriented to person, place, and time. He appears well-developed and well-nourished. No distress.   HENT:   Head: Normocephalic and atraumatic.   Right Ear: External ear normal.   Left Ear: External ear normal.   Nose: Nose normal.   Mouth/Throat: Oropharynx is clear and moist.   Eyes: Conjunctivae and EOM are normal. Right eye exhibits no discharge. Left eye exhibits no discharge. No scleral icterus.   Neck: Neck supple. No JVD present. No tracheal deviation present. No thyromegaly present.   Cardiovascular: Normal rate, regular rhythm, normal heart sounds and intact distal pulses.    No JVD.  No HJR.   Pulmonary/Chest: Effort normal and breath sounds normal. No stridor. No respiratory distress. He exhibits no tenderness.   Abdominal: Soft. Bowel sounds are normal. He exhibits no distension and no mass. There is no tenderness. There is no rebound and no guarding.   Musculoskeletal: He exhibits edema (trace pretibial pitting edema bilaterally.).   Neurological: He is alert and oriented to person, place, and time.   Skin: Skin is warm and dry. He is not diaphoretic.   Psychiatric: He has a normal mood and affect. His behavior is normal. Judgment and thought content normal.       Data Review:  All labs and radiology reviewed.    Assessment:  Active Problems:    Bilateral pulmonary embolism      Plan:  Patient was started on Xarelto in the emergency room and we will continue with this.  I will check Dopplers of lower extremity to get an idea what the clot burden is however clinically he appears to be doing quite well.  I suspect will be on the discharge in the  morning.    Alexys Brody MD  1/2/2017  5:00 PM

## 2017-01-02 NOTE — PLAN OF CARE
Problem: Patient Care Overview (Adult)  Goal: Plan of Care Review  Outcome: Ongoing (interventions implemented as appropriate)    01/02/17 7479   Coping/Psychosocial Response Interventions   Plan Of Care Reviewed With patient   Patient Care Overview   Progress no change   Outcome Evaluation   Outcome Summary/Follow up Plan New admit from ER. Admitted with bilateral PE. Has some pain when he takes deep breathes and walks. No pain in legs but does have mild swelling. Vital signs stable. CT in ER which revaled PE's. Continue to monitor waiting for orders.       Goal: Adult Individualization and Mutuality  Outcome: Ongoing (interventions implemented as appropriate)  Goal: Discharge Needs Assessment  Outcome: Ongoing (interventions implemented as appropriate)    Problem: Pain, Acute (Adult)  Goal: Identify Related Risk Factors and Signs and Symptoms  Outcome: Ongoing (interventions implemented as appropriate)  Goal: Acceptable Pain Control/Comfort Level  Outcome: Ongoing (interventions implemented as appropriate)    Problem: VTE, DVT and PE (Adult)  Goal: Signs and Symptoms of Listed Potential Problems Will be Absent or Manageable (VTE, DVT and PE)  Outcome: Ongoing (interventions implemented as appropriate)

## 2017-01-03 VITALS
HEIGHT: 71 IN | RESPIRATION RATE: 16 BRPM | DIASTOLIC BLOOD PRESSURE: 90 MMHG | WEIGHT: 190 LBS | TEMPERATURE: 98.3 F | BODY MASS INDEX: 26.6 KG/M2 | HEART RATE: 90 BPM | SYSTOLIC BLOOD PRESSURE: 140 MMHG | OXYGEN SATURATION: 98 %

## 2017-01-03 LAB
BH CV LOW VAS LEFT POSTERIOR TIBIAL VESSEL: 1
BH CV LOWER VASCULAR LEFT COMMON FEMORAL AUGMENT: NORMAL
BH CV LOWER VASCULAR LEFT COMMON FEMORAL COMPETENT: NORMAL
BH CV LOWER VASCULAR LEFT COMMON FEMORAL COMPRESS: NORMAL
BH CV LOWER VASCULAR LEFT COMMON FEMORAL PHASIC: NORMAL
BH CV LOWER VASCULAR LEFT COMMON FEMORAL SPONT: NORMAL
BH CV LOWER VASCULAR LEFT DISTAL FEMORAL COMPRESS: NORMAL
BH CV LOWER VASCULAR LEFT GASTRONEMIUS COMPRESS: NORMAL
BH CV LOWER VASCULAR LEFT GREATER SAPH AK COMPRESS: NORMAL
BH CV LOWER VASCULAR LEFT GREATER SAPH BK COMPRESS: NORMAL
BH CV LOWER VASCULAR LEFT LESSER SAPH COMPRESS: NORMAL
BH CV LOWER VASCULAR LEFT MID FEMORAL AUGMENT: NORMAL
BH CV LOWER VASCULAR LEFT MID FEMORAL COMPETENT: NORMAL
BH CV LOWER VASCULAR LEFT MID FEMORAL COMPRESS: NORMAL
BH CV LOWER VASCULAR LEFT MID FEMORAL PHASIC: NORMAL
BH CV LOWER VASCULAR LEFT MID FEMORAL SPONT: NORMAL
BH CV LOWER VASCULAR LEFT PERONEAL COMPRESS: NORMAL
BH CV LOWER VASCULAR LEFT POPLITEAL AUGMENT: NORMAL
BH CV LOWER VASCULAR LEFT POPLITEAL COMPETENT: NORMAL
BH CV LOWER VASCULAR LEFT POPLITEAL COMPRESS: NORMAL
BH CV LOWER VASCULAR LEFT POPLITEAL PHASIC: NORMAL
BH CV LOWER VASCULAR LEFT POPLITEAL SPONT: NORMAL
BH CV LOWER VASCULAR LEFT POSTERIOR TIBIAL COMPRESS: NORMAL
BH CV LOWER VASCULAR LEFT POSTERIOR TIBIAL THROMBUS: NORMAL
BH CV LOWER VASCULAR LEFT PROXIMAL FEMORAL COMPRESS: NORMAL
BH CV LOWER VASCULAR RIGHT COMMON FEMORAL AUGMENT: NORMAL
BH CV LOWER VASCULAR RIGHT COMMON FEMORAL COMPETENT: NORMAL
BH CV LOWER VASCULAR RIGHT COMMON FEMORAL COMPRESS: NORMAL
BH CV LOWER VASCULAR RIGHT COMMON FEMORAL PHASIC: NORMAL
BH CV LOWER VASCULAR RIGHT COMMON FEMORAL SPONT: NORMAL
BH CV LOWER VASCULAR RIGHT DISTAL FEMORAL COMPRESS: NORMAL
BH CV LOWER VASCULAR RIGHT GASTRONEMIUS COMPRESS: NORMAL
BH CV LOWER VASCULAR RIGHT GREATER SAPH AK COMPRESS: NORMAL
BH CV LOWER VASCULAR RIGHT GREATER SAPH BK COMPRESS: NORMAL
BH CV LOWER VASCULAR RIGHT LESSER SAPH COMPRESS: NORMAL
BH CV LOWER VASCULAR RIGHT MID FEMORAL AUGMENT: NORMAL
BH CV LOWER VASCULAR RIGHT MID FEMORAL COMPETENT: NORMAL
BH CV LOWER VASCULAR RIGHT MID FEMORAL COMPRESS: NORMAL
BH CV LOWER VASCULAR RIGHT MID FEMORAL PHASIC: NORMAL
BH CV LOWER VASCULAR RIGHT MID FEMORAL SPONT: NORMAL
BH CV LOWER VASCULAR RIGHT PERONEAL COMPRESS: NORMAL
BH CV LOWER VASCULAR RIGHT POPLITEAL AUGMENT: NORMAL
BH CV LOWER VASCULAR RIGHT POPLITEAL COMPETENT: NORMAL
BH CV LOWER VASCULAR RIGHT POPLITEAL COMPRESS: NORMAL
BH CV LOWER VASCULAR RIGHT POPLITEAL PHASIC: NORMAL
BH CV LOWER VASCULAR RIGHT POPLITEAL SPONT: NORMAL
BH CV LOWER VASCULAR RIGHT POSTERIOR TIBIAL COMPRESS: NORMAL
BH CV LOWER VASCULAR RIGHT PROXIMAL FEMORAL COMPRESS: NORMAL
DEPRECATED RDW RBC AUTO: 43.7 FL (ref 37–54)
ERYTHROCYTE [DISTWIDTH] IN BLOOD BY AUTOMATED COUNT: 13 % (ref 11.5–14.5)
HCT VFR BLD AUTO: 45.6 % (ref 40.4–52.2)
HGB BLD-MCNC: 15 G/DL (ref 13.7–17.6)
MCH RBC QN AUTO: 30.5 PG (ref 27–32.7)
MCHC RBC AUTO-ENTMCNC: 32.9 G/DL (ref 32.6–36.4)
MCV RBC AUTO: 92.7 FL (ref 79.8–96.2)
PLATELET # BLD AUTO: 180 10*3/MM3 (ref 140–500)
PMV BLD AUTO: 10.1 FL (ref 6–12)
RBC # BLD AUTO: 4.92 10*6/MM3 (ref 4.6–6)
WBC NRBC COR # BLD: 8.89 10*3/MM3 (ref 4.5–10.7)

## 2017-01-03 PROCEDURE — G0378 HOSPITAL OBSERVATION PER HR: HCPCS

## 2017-01-03 PROCEDURE — 85027 COMPLETE CBC AUTOMATED: CPT | Performed by: HOSPITALIST

## 2017-01-03 RX ADMIN — HYDROCODONE BITARTRATE AND ACETAMINOPHEN 1 TABLET: 10; 325 TABLET ORAL at 00:33

## 2017-01-03 RX ADMIN — ACETAMINOPHEN 650 MG: 325 TABLET ORAL at 00:33

## 2017-01-03 RX ADMIN — RIVAROXABAN 15 MG: 15 TABLET, FILM COATED ORAL at 08:28

## 2017-01-03 RX ADMIN — CYCLOBENZAPRINE HYDROCHLORIDE 10 MG: 10 TABLET, FILM COATED ORAL at 00:33

## 2017-01-03 RX ADMIN — RIVAROXABAN 15 MG: 15 TABLET, FILM COATED ORAL at 00:35

## 2017-01-03 NOTE — PROGRESS NOTES
Continued Stay Note  [facility]     Patient Name: Oliverio Corrales  MRN: [id number]  Today's Date: 1/3/2017    Admit Date: 1/2/2017          Discharge Plan       01/03/17 0942    Case Management/Social Work Plan    Additional Comments DC orders in EPIC.  Patient will be dc'd on Xarelto ( 15mg BID, then 20mg daily).  Faxed to [facility] at [phone].  Called and spoke with Jocelyne ( [phone]) at [facility].  She said that the 15mg has been approved and they will have to run the 20 mg at a later time.  No further needs assessed.     Final Note    Final Note DC home               Discharge Codes       01/03/17 0931    Discharge Codes    Discharge Codes 01  Discharge to home        Expected Discharge Date and Time     Expected Discharge Date Expected Discharge Time    [place] 3, 2017             Nadira Henry RN

## 2017-01-03 NOTE — PLAN OF CARE
Problem: Adjustment to Hospitalization, Illness, Injury, Treatment (Adult,Pediatric)  Goal: Identify Related Risk Factors and Signs and Symptoms  Outcome: Ongoing (interventions implemented as appropriate)    01/02/17 2047   Adjustment to Hospitalization, Illness, Injury, Treatment   Signs and Symptoms (Adjustment to Hospital/Illness) activity level change       Goal: Adjustment to Events/Situations regarding Hospitalization/Illness/Injury/Treatment  Outcome: Ongoing (interventions implemented as appropriate)  Goal: Continued Mastery/Enhancement of Self-Esteem while Adjusting to Hospitalization/Illness/Injury  Outcome: Ongoing (interventions implemented as appropriate)

## 2017-01-03 NOTE — DISCHARGE SUMMARY
PHYSICIAN DISCHARGE SUMMARY                                                                        [facility]    Patient Identification:  Name: Oliverio Corrales  Age: 51 y.o.  Sex: male  :  1965  MRN: [id number]  Primary Care Physician: Raeann Becerra MD    Admit date: 2017  Discharge date and time: 1/3/2017     Discharged Condition: good    Discharge Diagnoses:Active Problems:    Bilateral pulmonary embolism         Hospital Course:  Pleasant 51-year-old gentleman presenting with chest pain and workup revealing bilateral pulmonary emboli.  He was afebrile vital signs stable.  No signs of cor pulmonale associated with the presentation.  Venous Doppler did show one remaining DVT in the left popliteal vein.  He was started on Xarelto presentation and has tolerated it well.  He has remained afebrile vital signs stable with good room air O2 sats.  At this point then he can be discharge remainder treatment and follow-up as an outpatient.      Consults:     Consults     No orders found for last 30 day(s).            Discharge Exam:  Physical Exam  Afebrile vital signs stable.  Well-developed well-nourished male in no apparent distress.  Lungs with good air movement and normal rate.  Heart regular rate and rhythm.  Alert oriented conversant cooperative.     Disposition:  Home    Patient Instructions:    Oliverio Corrales   Home Medication Instructions SOILA:[id number]    Printed on:17 0802   Medication Information                      cyclobenzaprine (FLEXERIL) 10 MG tablet  Take 10 mg by mouth 3 (Three) Times a Day As Needed for muscle spasms.             HYDROcodone-acetaminophen (NORCO)  MG per tablet  Take 1 tablet by mouth Every 6 (Six) Hours As Needed for moderate pain (4-6).             rivaroxaban (XARELTO) 15 MG tablet  Take 1 tablet by mouth 2 (Two) Times a Day With Meals for 20 days.             rivaroxaban  (XARELTO) 20 MG tablet  Take 1 tablet by mouth Daily With Dinner.               No future appointments.  Referrals and Follow-ups to Schedule     Follow-Up    As directed    Keep Orthopedic follow up.   Follow Up Details:  PCP in 1 week.             Follow-up Information     Follow up with Raeann Becerra MD .    Specialty:  Family Medicine    Contact information:    [address] [id number]  [phone]          Discharge Order     Start     Ordered    01/03/17 0801  Discharge patient  Once     Expected Discharge Date:  01/03/17    Discharge Disposition:  Home or Self Care        01/03/17 0801                Signed:  Alexys Brody MD  1/3/2017  8:02 AM

## 2017-01-03 NOTE — PROGRESS NOTES
Vascular lab bilateral lower extremity venous doppler complete, right leg negative, left leg new calf vein thrombosis without extension to popliteal vein - [facility] aware

## 2017-03-01 ENCOUNTER — HOSPITAL ENCOUNTER (EMERGENCY)
Facility: HOSPITAL | Age: 52
Discharge: HOME | End: 2017-03-01
Attending: EMERGENCY MEDICINE | Admitting: EMERGENCY MEDICINE

## 2017-03-01 VITALS
HEART RATE: 79 BPM | TEMPERATURE: 98 F | SYSTOLIC BLOOD PRESSURE: 148 MMHG | WEIGHT: 190 LBS | BODY MASS INDEX: 26.6 KG/M2 | HEIGHT: 71 IN | OXYGEN SATURATION: 99 % | DIASTOLIC BLOOD PRESSURE: 101 MMHG | RESPIRATION RATE: 16 BRPM

## 2017-03-01 DIAGNOSIS — Z76.0 ENCOUNTER FOR MEDICATION REFILL: Primary | ICD-10-CM

## 2017-03-01 PROCEDURE — 99283 EMERGENCY DEPT VISIT LOW MDM: CPT

## 2017-03-01 RX ADMIN — RIVAROXABAN 20 MG: 20 TABLET, FILM COATED ORAL at 20:59

## 2017-03-02 ENCOUNTER — TELEPHONE (OUTPATIENT)
Dept: SOCIAL WORK | Facility: HOSPITAL | Age: 52
End: 2017-03-02

## 2017-03-02 NOTE — DISCHARGE INSTRUCTIONS
Medications as ordered  Follow up with pmd- call for appointment  Return to er for any further concerns

## 2017-03-02 NOTE — ED PROVIDER NOTES
EMERGENCY DEPARTMENT ENCOUNTER    CHIEF COMPLAINT  Chief Complaint: medication refill  History given by: patient  History limited by: N/A  Room Number: 28/28  PMD: Raeann Becerra MD      HPI:  Pt is a 51 y.o. male who presents for medication refill. He reports that on 1/3/17, Dr Brody ([facility] hospitalist) prescribed him 15mg xarelto BID for 20 days and then 20mg xarelto QD for 30 days. He has finished taking both prescriptions (took last dose of 20mg xarelto QD yesterday). However, the wrong dosage has been called in to the pharmacy multiple times and so pt would like a prescription for 20mg xarelto QD. He denies recent illness, fevers, chills, chest pain, trouble breathing, and N/V/D. Past Medical History of pulmonary embolism, CHF, HTN, CAD, and diabetes.     Duration: started today  Timing: constant  Location: N/A  Radiation: N/A  Quality: N/A  Intensity/Severity: mild  Progression: N/A  Associated Symptoms: none  Aggravating Factors: N/A  Alleviating Factors: N/A  Previous Episodes: N/A  Treatment before arrival: N/A    PAST MEDICAL HISTORY  Active Ambulatory Problems     Diagnosis Date Noted   • Bilateral pulmonary embolism 01/02/2017     Resolved Ambulatory Problems     Diagnosis Date Noted   • No Resolved Ambulatory Problems     Past Medical History   Diagnosis Date   • Pulmonary embolism        PAST SURGICAL HISTORY  Past Surgical History   Procedure Laterality Date   • Shoulder surgery Left    • Cholecystectomy         FAMILY HISTORY  History reviewed. No pertinent family history.    SOCIAL HISTORY  Social History     Social History   • Marital status: Single     Spouse name: N/A   • Number of children: N/A   • Years of education: N/A     Occupational History   • Not on file.     Social History Main Topics   • Smoking status: Never Smoker   • Smokeless tobacco: Not on file   • Alcohol use No   • Drug use: No   • Sexual activity: Defer     Other Topics Concern   • Not on file     Social History  Narrative         ALLERGIES  Hydrocortisone    REVIEW OF SYSTEMS  Review of Systems   Constitutional: Negative for chills and fever.   HENT: Negative for sore throat.    Respiratory: Negative for shortness of breath.    Cardiovascular: Negative for chest pain.   Gastrointestinal: Negative for nausea and vomiting.   Genitourinary: Negative for dysuria.   Musculoskeletal: Negative for back pain.   Skin: Negative for rash.   Psychiatric/Behavioral: The patient is not nervous/anxious.        PHYSICAL EXAM  ED Triage Vitals   Temp Heart Rate Resp BP SpO2   03/01/17 2009 03/01/17 2004 03/01/17 2004 03/01/17 2011 03/01/17 2004   98 °F (36.7 °C) 79 16 148/101 99 % WNL         Physical Exam   Constitutional: He is oriented to person, place, and time and well-developed, well-nourished, and in no distress. No distress.   HENT:   Head: Atraumatic.   Mouth/Throat: Mucous membranes are normal.   Eyes: No scleral icterus.   Neck: Normal range of motion.   Cardiovascular: Normal rate, regular rhythm and normal heart sounds.    Pulmonary/Chest: Effort normal and breath sounds normal. No respiratory distress.   Musculoskeletal: Normal range of motion.   Neurological: He is alert and oriented to person, place, and time. He has normal sensation and normal strength.   Skin: Skin is warm and dry.   Psychiatric: Mood and affect normal.   Nursing note and vitals reviewed.            PROGRESS AND CONSULTS  8:26 PM- Reviewed pt's history and workup with Dr. Camacho.  At bedside evaluation, they agree with the plan of care.  8:35 PM- Reviewed implications of results, diagnosis, meds, responsibility to follow up, warning signs and symptoms of possible worsening, potential complications and reasons to return to ER with patient.  Discussed all results and noted any abnormalities with patient.  Discussed absolute need to recheck abnormalities and condition with PMD. Informed pt that he will receive today's xarelto dosage in ED and then will be  "given prescription for 20mg xarelto QD for 30 days.   Discussed plan for discharge, as there is no emergent indication for admission.  Pt is agreeable and understands need for follow up and repeat testing.  Pt is aware that discharge does not mean that nothing is wrong but it indicates no emergency is present.  Pt is discharged with instructions to follow up with primary care doctor to have their blood pressure rechecked.   8:50 PM- Ordered 20mg xarelto prior to discharge.   8:54 PM- CCP RN will contact pt's PMD's office tomorrow in an attempt to rectify the miscommunication regarding pt's xarelto dosage.        DIAGNOSIS  Final diagnoses:   Encounter for medication refill       FOLLOW UP   Raeann Becerra MD  [address] [address]  [phone]    Call in 1 day        RX     rivaroxaban 20 MG tablet   Commonly known as:  XARELTO   Take 1 tablet by mouth Daily With Dinner.         MEDICATIONS GIVEN IN ER  Medications   rivaroxaban (XARELTO) tablet 20 mg (not administered)       Visit Vitals   • BP (!) 148/101   • Pulse 79   • Temp 98 °F (36.7 °C)   • Resp 16   • Ht 71\" (180.3 cm)   • Wt 190 lb (86.2 kg)   • SpO2 99%   • BMI 26.5 kg/m2         I personally reviewed the past medical history, past surgical history, social history, family history, current medications and allergies as they appear in this chart.  The scribe's note accurately reflects the work and decisions made by me.     I personally scribed for JACLYN Acosta on 3/1/2017 at 8:56 PM.  Electronically signed by Dong Martin on 3/1/2017 at time 8:56 PM     Dong Martin  03/01/17 2101       YOLI Hill  03/01/17 3129    "

## 2017-03-02 NOTE — TELEPHONE ENCOUNTER
Spoke w/ Katherine at Dr. Becerra's office re prescribed dose of Xarelto, and she corrected it. Called patient and informed him

## 2017-03-02 NOTE — ED PROVIDER NOTES
Pt with recent hx of PE is out of his Xarelto and unable to get a refill at his PCP. At this time he is asymptomatic and his exam is normal. Will refill his Xarelto and discharge to follow up with PCP     I supervised care provided by the midlevel provider.   We have discussed this patient's history, physical exam, and treatment plan.  I have reviewed the note and personally saw and examined the patient and agree with the plan of care. See attending note.  Documentation assistance provided by roxana Colón for Dr. Cmaacho.  Information recorded by the scrdiannee was done at my direction and has been verified and validated by me.       Laura Colón  03/01/17 2749       Sarabjit Camacho MD  03/01/17 2084

## 2017-04-20 ENCOUNTER — APPOINTMENT (OUTPATIENT)
Dept: GENERAL RADIOLOGY | Facility: HOSPITAL | Age: 52
End: 2017-04-20

## 2017-04-20 ENCOUNTER — HOSPITAL ENCOUNTER (EMERGENCY)
Facility: HOSPITAL | Age: 52
Discharge: HOME | End: 2017-04-21
Attending: EMERGENCY MEDICINE | Admitting: EMERGENCY MEDICINE

## 2017-04-20 DIAGNOSIS — R00.2 PALPITATIONS: ICD-10-CM

## 2017-04-20 DIAGNOSIS — S90.31XA CONTUSION OF RIGHT FOOT, INITIAL ENCOUNTER: Primary | ICD-10-CM

## 2017-04-20 PROCEDURE — 99283 EMERGENCY DEPT VISIT LOW MDM: CPT

## 2017-04-20 PROCEDURE — 93005 ELECTROCARDIOGRAM TRACING: CPT

## 2017-04-20 PROCEDURE — 93010 ELECTROCARDIOGRAM REPORT: CPT | Performed by: INTERNAL MEDICINE

## 2017-04-20 PROCEDURE — 85025 COMPLETE CBC W/AUTO DIFF WBC: CPT | Performed by: EMERGENCY MEDICINE

## 2017-04-20 PROCEDURE — 85379 FIBRIN DEGRADATION QUANT: CPT | Performed by: EMERGENCY MEDICINE

## 2017-04-20 PROCEDURE — 73660 X-RAY EXAM OF TOE(S): CPT

## 2017-04-20 PROCEDURE — 85610 PROTHROMBIN TIME: CPT | Performed by: EMERGENCY MEDICINE

## 2017-04-20 PROCEDURE — 80053 COMPREHEN METABOLIC PANEL: CPT | Performed by: EMERGENCY MEDICINE

## 2017-04-20 RX ORDER — LORAZEPAM 0.5 MG/1
0.5 TABLET ORAL EVERY 8 HOURS PRN
COMMUNITY
End: 2018-10-30

## 2017-04-21 ENCOUNTER — APPOINTMENT (OUTPATIENT)
Dept: CT IMAGING | Facility: HOSPITAL | Age: 52
End: 2017-04-21

## 2017-04-21 VITALS
OXYGEN SATURATION: 97 % | DIASTOLIC BLOOD PRESSURE: 91 MMHG | HEIGHT: 71 IN | WEIGHT: 195 LBS | BODY MASS INDEX: 27.3 KG/M2 | TEMPERATURE: 97.7 F | SYSTOLIC BLOOD PRESSURE: 150 MMHG | RESPIRATION RATE: 18 BRPM | HEART RATE: 64 BPM

## 2017-04-21 LAB
ALBUMIN SERPL-MCNC: 3.5 G/DL (ref 3.5–5.2)
ALBUMIN/GLOB SERPL: 1.2 G/DL
ALP SERPL-CCNC: 105 U/L (ref 39–117)
ALT SERPL W P-5'-P-CCNC: 34 U/L (ref 1–41)
ANION GAP SERPL CALCULATED.3IONS-SCNC: 11.7 MMOL/L
AST SERPL-CCNC: 26 U/L (ref 1–40)
BASOPHILS # BLD AUTO: 0.05 10*3/MM3 (ref 0–0.2)
BASOPHILS NFR BLD AUTO: 0.7 % (ref 0–1.5)
BILIRUB SERPL-MCNC: 1 MG/DL (ref 0.1–1.2)
BUN BLD-MCNC: 15 MG/DL (ref 6–20)
BUN/CREAT SERPL: 15.2 (ref 7–25)
CALCIUM SPEC-SCNC: 9.1 MG/DL (ref 8.6–10.5)
CHLORIDE SERPL-SCNC: 102 MMOL/L (ref 98–107)
CO2 SERPL-SCNC: 26.3 MMOL/L (ref 22–29)
CREAT BLD-MCNC: 0.99 MG/DL (ref 0.76–1.27)
D DIMER PPP FEU-MCNC: <0.27 MCGFEU/ML (ref 0–0.49)
DEPRECATED RDW RBC AUTO: 46.4 FL (ref 37–54)
EOSINOPHIL # BLD AUTO: 0.2 10*3/MM3 (ref 0–0.7)
EOSINOPHIL NFR BLD AUTO: 2.7 % (ref 0.3–6.2)
ERYTHROCYTE [DISTWIDTH] IN BLOOD BY AUTOMATED COUNT: 14.1 % (ref 11.5–14.5)
GFR SERPL CREATININE-BSD FRML MDRD: 79 ML/MIN/1.73
GLOBULIN UR ELPH-MCNC: 2.9 GM/DL
GLUCOSE BLD-MCNC: 126 MG/DL (ref 65–99)
HCT VFR BLD AUTO: 43.9 % (ref 40.4–52.2)
HGB BLD-MCNC: 15.1 G/DL (ref 13.7–17.6)
IMM GRANULOCYTES # BLD: 0.04 10*3/MM3 (ref 0–0.03)
IMM GRANULOCYTES NFR BLD: 0.5 % (ref 0–0.5)
INR PPP: 1.15 (ref 0.9–1.1)
LYMPHOCYTES # BLD AUTO: 2.99 10*3/MM3 (ref 0.9–4.8)
LYMPHOCYTES NFR BLD AUTO: 40.4 % (ref 19.6–45.3)
MCH RBC QN AUTO: 31.1 PG (ref 27–32.7)
MCHC RBC AUTO-ENTMCNC: 34.4 G/DL (ref 32.6–36.4)
MCV RBC AUTO: 90.5 FL (ref 79.8–96.2)
MONOCYTES # BLD AUTO: 0.63 10*3/MM3 (ref 0.2–1.2)
MONOCYTES NFR BLD AUTO: 8.5 % (ref 5–12)
NEUTROPHILS # BLD AUTO: 3.49 10*3/MM3 (ref 1.9–8.1)
NEUTROPHILS NFR BLD AUTO: 47.2 % (ref 42.7–76)
PLATELET # BLD AUTO: 185 10*3/MM3 (ref 140–500)
PMV BLD AUTO: 10.3 FL (ref 6–12)
POTASSIUM BLD-SCNC: 3.9 MMOL/L (ref 3.5–5.2)
PROT SERPL-MCNC: 6.4 G/DL (ref 6–8.5)
PROTHROMBIN TIME: 14.2 SECONDS (ref 11.7–14.2)
RBC # BLD AUTO: 4.85 10*6/MM3 (ref 4.6–6)
SODIUM BLD-SCNC: 140 MMOL/L (ref 136–145)
WBC NRBC COR # BLD: 7.4 10*3/MM3 (ref 4.5–10.7)

## 2017-04-21 PROCEDURE — 71275 CT ANGIOGRAPHY CHEST: CPT

## 2017-04-21 PROCEDURE — 0 IOPAMIDOL PER 1 ML: Performed by: EMERGENCY MEDICINE

## 2017-04-21 RX ORDER — HYDROCODONE BITARTRATE AND ACETAMINOPHEN 5; 325 MG/1; MG/1
1 TABLET ORAL EVERY 6 HOURS PRN
Qty: 10 TABLET | Refills: 0 | Status: SHIPPED | OUTPATIENT
Start: 2017-04-21 | End: 2018-10-30

## 2017-04-21 RX ADMIN — IOPAMIDOL 95 ML: 755 INJECTION, SOLUTION INTRAVENOUS at 00:56

## 2017-04-21 NOTE — ED PROVIDER NOTES
" EMERGENCY DEPARTMENT ENCOUNTER    CHIEF COMPLAINT  Chief Complaint: Toe injury  History given by: patient   History limited by: n/a  Room Number: 19/19  PMD: Raeann Becerra MD      HPI:  Pt is a 51 y.o. male who presents complaining of right 2nd toe injury. Pt states that he has a hx of arthritis, and states that he jammed the toe today. Pt also complains of palpitations described as a heart racing sensation. Pt states that he was on Xarelto, but stopped taking it because it was \"making him feel funny\". He was taking the Xarelto secondary to a PE dx in early January.     Duration:  Prior to arrival  Onset: sudden  Timing: constant   Location: right 2nd toe   Radiation: none  Quality: pain  Intensity/Severity: mild   Progression: unchanged   Associated Symptoms: palpitations   Aggravating Factors: none  Alleviating Factors: none  Previous Episodes: hx of arthritis and PE  Treatment before arrival: none    PAST MEDICAL HISTORY  Active Ambulatory Problems     Diagnosis Date Noted   • Bilateral pulmonary embolism 01/02/2017     Resolved Ambulatory Problems     Diagnosis Date Noted   • No Resolved Ambulatory Problems     Past Medical History:   Diagnosis Date   • Pulmonary embolism        PAST SURGICAL HISTORY  Past Surgical History:   Procedure Laterality Date   • CHOLECYSTECTOMY     • SHOULDER SURGERY Left        FAMILY HISTORY  History reviewed. No pertinent family history.    SOCIAL HISTORY  Social History     Social History   • Marital status: Single     Spouse name: N/A   • Number of children: N/A   • Years of education: N/A     Occupational History   • Not on file.     Social History Main Topics   • Smoking status: Never Smoker   • Smokeless tobacco: Not on file   • Alcohol use No   • Drug use: No   • Sexual activity: Defer     Other Topics Concern   • Not on file     Social History Narrative       ALLERGIES  Hydrocortisone    REVIEW OF SYSTEMS  Review of Systems   Constitutional: Negative for chills and " fever.   HENT: Negative for congestion and sore throat.    Eyes: Negative.    Respiratory: Negative for cough and shortness of breath.    Cardiovascular: Positive for palpitations. Negative for chest pain and leg swelling.   Gastrointestinal: Negative for abdominal pain, diarrhea and vomiting.   Genitourinary: Negative for difficulty urinating and dysuria.   Musculoskeletal: Positive for myalgias (right 2nd toe). Negative for back pain and neck pain.   Skin: Negative for rash and wound.   Allergic/Immunologic: Negative.    Neurological: Negative for dizziness, weakness, numbness and headaches.   Psychiatric/Behavioral: Negative.    All other systems reviewed and are negative.      PHYSICAL EXAM  ED Triage Vitals   Temp Heart Rate Resp BP SpO2   04/20/17 2057 04/20/17 2057 04/20/17 2107 04/20/17 2106 04/20/17 2057   97.7 °F (36.5 °C) 80 16 149/106 100 %      Temp src Heart Rate Source Patient Position BP Location FiO2 (%)   04/20/17 2057 04/20/17 2057 04/20/17 2106 04/20/17 2106 --   Tympanic Monitor Sitting Left arm        Physical Exam   Constitutional: He is oriented to person, place, and time and well-developed, well-nourished, and in no distress.   HENT:   Head: Normocephalic and atraumatic.   Eyes: EOM are normal. Pupils are equal, round, and reactive to light.   Neck: Normal range of motion. Neck supple.   Cardiovascular: Normal rate, regular rhythm and normal heart sounds.  Exam reveals no gallop and no friction rub.    No murmur heard.  Pulmonary/Chest: Effort normal and breath sounds normal. No respiratory distress.   Abdominal: Soft. There is no tenderness. There is no rebound and no guarding.   Musculoskeletal: Normal range of motion. He exhibits no edema.   Mild tenderness to the r forefoot. No gross deformity. Sensation and capillary refill are intact.    Neurological: He is alert and oriented to person, place, and time. He has normal sensation and normal strength.   Skin: Skin is warm and dry.    Psychiatric: Mood and affect normal.   Nursing note and vitals reviewed.      LAB RESULTS  Lab Results (last 24 hours)     Procedure Component Value Units Date/Time    CBC & Differential [[id number]] Collected:  04/20/17 2358    Specimen:  Blood Updated:  04/21/17 0009    Narrative:       The following orders were created for panel order CBC & Differential.  Procedure                               Abnormality         Status                     ---------                               -----------         ------                     CBC Auto Differential[[id number]]         Abnormal            Final result                 Please view results for these tests on the individual orders.    Comprehensive Metabolic Panel [[id number]]  (Abnormal) Collected:  04/20/17 2358    Specimen:  Blood Updated:  04/21/17 0044     Glucose 126 (H) mg/dL      BUN 15 mg/dL      Creatinine 0.99 mg/dL      Sodium 140 mmol/L      Potassium 3.9 mmol/L      Chloride 102 mmol/L      CO2 26.3 mmol/L      Calcium 9.1 mg/dL      Total Protein 6.4 g/dL      Albumin 3.50 g/dL      ALT (SGPT) 34 U/L      AST (SGOT) 26 U/L      Alkaline Phosphatase 105 U/L      Total Bilirubin 1.0 mg/dL      eGFR Non African Amer 79 mL/min/1.73      Globulin 2.9 gm/dL      A/G Ratio 1.2 g/dL      BUN/Creatinine Ratio 15.2     Anion Gap 11.7 mmol/L     Protime-INR [[id number]]  (Abnormal) Collected:  04/20/17 2358    Specimen:  Blood Updated:  04/21/17 0026     Protime 14.2 Seconds      INR 1.15 (H)    D-dimer, Quantitative [[id number]]  (Normal) Collected:  04/20/17 2358    Specimen:  Blood Updated:  04/21/17 0026     D-Dimer, Quantitative <0.27 MCGFEU/mL     Narrative:       The Stago D-Dimer test used in conjunction with a clinical pretest probability (PTP) assessment model, has been approved by the FDA to rule out the presence of venous thromboembolism (VTE) in outpatients suspected of deep venous thrombosis (DVT) or pulmonary embolism (PE).     CBC Auto Differential  [[id number]]  (Abnormal) Collected:  04/20/17 2358    Specimen:  Blood Updated:  04/21/17 0009     WBC 7.40 10*3/mm3      RBC 4.85 10*6/mm3      Hemoglobin 15.1 g/dL      Hematocrit 43.9 %      MCV 90.5 fL      MCH 31.1 pg      MCHC 34.4 g/dL      RDW 14.1 %      RDW-SD 46.4 fl      MPV 10.3 fL      Platelets 185 10*3/mm3      Neutrophil % 47.2 %      Lymphocyte % 40.4 %      Monocyte % 8.5 %      Eosinophil % 2.7 %      Basophil % 0.7 %      Immature Grans % 0.5 %      Neutrophils, Absolute 3.49 10*3/mm3      Lymphocytes, Absolute 2.99 10*3/mm3      Monocytes, Absolute 0.63 10*3/mm3      Eosinophils, Absolute 0.20 10*3/mm3      Basophils, Absolute 0.05 10*3/mm3      Immature Grans, Absolute 0.04 (H) 10*3/mm3           I ordered the above labs and reviewed the results    RADIOLOGY  XR Toe 2+ View Right   Final Result      CT Angiogram Chest With Contrast    (Results Pending)   XR right toe shows NAD  CTA chest shows NAD    I ordered the above noted radiological studies. Interpreted by radiologist. Discussed with radiologist (Dr Cleaning). Reviewed by me in PACS.       PROCEDURES  Procedures    EKG           EKG time: 21:30  Rhythm/Rate: NSR 67  P waves and FL: nml  QRS, axis: nml   ST and T waves: t wave inversion in 3     Interpreted Contemporaneously by me, independently viewed  Unchanged compared to prior 1/2017    PROGRESS AND CONSULTS  ED Course     21:12  XR right toe and and EKG ordered for further evaluation.     23:50  BP- 135/99 HR- 80 Temp- 97.7 °F (36.5 °C) (Tympanic) O2 sat- 100%  Advised pt of the plan for a  CTA chest due to stopping Xarelto with a hx of a PE. Pt understands and agrees with the plan, all questions answered.    23:53  Labs and CTA chest ordered for further evaluation.     01:27  BP- 135/99 HR- 80 Temp- 97.7 °F (36.5 °C) (Tympanic) O2 sat- 100%  Rechecked the patient who is in NAD and is resting comfortably.Advised pt that the workup shows NAD. Advised pt of the plan for for discharge,  pt advised to f/u with cardiology. Pt understands and agrees with the plan, all questions answered.    MEDICAL DECISION MAKING  Results were reviewed/discussed with the patient and they were also made aware of online access. Pt also made aware that some labs, such as cultures, will not be resulted during ER visit and follow up with PMD is necessary.     MDM  Number of Diagnoses or Management Options     Amount and/or Complexity of Data Reviewed  Clinical lab tests: ordered and reviewed (Ddimer is <0.27)  Tests in the radiology section of CPT®: ordered and reviewed (CTA shows NAD  XR right foot shows NAD)  Tests in the medicine section of CPT®: ordered and reviewed (See EKG procedure note. )  Discussion of test results with the performing providers: yes (Dr Cleaning)  Independent visualization of images, tracings, or specimens: yes    Patient Progress  Patient progress: stable         DIAGNOSIS  Final diagnoses:   Contusion of right foot, initial encounter   Palpitations       DISPOSITION  DISCHARGE    Patient discharged in stable condition.    Reviewed implications of results, diagnosis, meds, responsibility to follow up, warning signs and symptoms of possible worsening, potential complications and reasons to return to ER.    Patient/Family voiced understanding of above instructions.    Discussed plan for discharge, as there is no emergent indication for admission.  Pt/family is agreeable and understands need for follow up and repeat testing.  Pt is aware that discharge does not mean that nothing is wrong but it indicates no emergency is present that requires admission and they must continue care with follow-up as given below or physician of their choice.     FOLLOW-UP  Raeann Becerra MD  [address] [facility] [address] [address]  [facility] [id number] [phone]    Schedule an appointment as soon as possible for a visit      Chito Benz MD  [address] [facility] [address]  [facility]  [address]  [phone]    Schedule an appointment as soon as possible for a visit           Medication List      New Prescriptions          HYDROcodone-acetaminophen 5-325 MG per tablet   Commonly known as:  NORCO   Take 1 tablet by mouth Every 6 (Six) Hours As Needed for Moderate Pain   (4-6).   Replaces:  HYDROcodone-acetaminophen  MG per tablet         Changed          rivaroxaban 20 MG tablet   Commonly known as:  XARELTO   Take 1 tablet by mouth Daily With Dinner.   What changed:  Another medication with the same name was removed. Continue   taking this medication, and follow the directions you see here.         Stop          HYDROcodone-acetaminophen  MG per tablet   Commonly known as:  NORCO   Replaced by:  HYDROcodone-acetaminophen 5-325 MG per tablet           Latest Documented Vital Signs:  As of 1:34 AM  BP- (!) 138/102 HR- 64 Temp- 97.7 °F (36.5 °C) (Tympanic) O2 sat- 94%    --  Documentation assistance provided by roxana Leyva for Dr Hernandez.  Information recorded by the scribe was done at my direction and has been verified and validated by me.        Sophia Leyva  04/21/17 0134       Nathanael Hernandez MD  04/21/17 0684

## 2017-04-21 NOTE — ED TRIAGE NOTES
States feels weird.  Palpitations.  States beginning of February.  Pt thinks it may be related to Xaralto.  Pt states hit rt 2nd toe today and would like that checked also.

## 2017-04-21 NOTE — ED TRIAGE NOTES
"Pt states \"I stubbed my right third toe real bad and I think I've broken it\".  Pt states \"and since they started me on xarelto my heart has been fluttering.  I think I'm allergic to it\".  "

## 2017-04-21 NOTE — ED NOTES
"Pt to Room 19 with c/o right second toe pain s/p after striking it on a wooden stake in the yard.  Pt states that he also was placed of Xarelto in January for blood clots that were located in his legs and lungs and he feels like he is having a difficult time \"catching my breath.\"  Pt states that he was on the tredmill on Monday and he usually can work out for 45 minutes without issues.  Pt sates that he became winded.  Pt denies fever, chills, n/v/d, blurred vision, chest pain, abdominal pain, loss in control of bowel/bladderBed in low position, call light within reach, side rails up X2.  98% on Room Air.      Rosalba Cespedes RN  04/20/17 0603    "

## 2018-01-02 ENCOUNTER — OFFICE (OUTPATIENT)
Dept: URBAN - METROPOLITAN AREA CLINIC 75 | Facility: CLINIC | Age: 53
End: 2018-01-02
Payer: MEDICAID

## 2018-01-02 VITALS
WEIGHT: 193 LBS | SYSTOLIC BLOOD PRESSURE: 128 MMHG | HEART RATE: 72 BPM | DIASTOLIC BLOOD PRESSURE: 80 MMHG | HEIGHT: 71 IN

## 2018-01-02 DIAGNOSIS — K22.70 BARRETT'S ESOPHAGUS WITHOUT DYSPLASIA: ICD-10-CM

## 2018-01-02 DIAGNOSIS — K52.9 NONINFECTIVE GASTROENTERITIS AND COLITIS, UNSPECIFIED: ICD-10-CM

## 2018-01-02 DIAGNOSIS — R12 HEARTBURN: ICD-10-CM

## 2018-01-02 PROCEDURE — 99204 OFFICE O/P NEW MOD 45 MIN: CPT | Performed by: INTERNAL MEDICINE

## 2018-01-20 ENCOUNTER — APPOINTMENT (OUTPATIENT)
Dept: GENERAL RADIOLOGY | Facility: HOSPITAL | Age: 53
End: 2018-01-20

## 2018-01-20 ENCOUNTER — HOSPITAL ENCOUNTER (EMERGENCY)
Facility: HOSPITAL | Age: 53
Discharge: HOME | End: 2018-01-20
Attending: EMERGENCY MEDICINE | Admitting: EMERGENCY MEDICINE

## 2018-01-20 VITALS
BODY MASS INDEX: 27.3 KG/M2 | HEIGHT: 71 IN | RESPIRATION RATE: 18 BRPM | HEART RATE: 78 BPM | TEMPERATURE: 98.3 F | SYSTOLIC BLOOD PRESSURE: 156 MMHG | WEIGHT: 195 LBS | OXYGEN SATURATION: 97 % | DIASTOLIC BLOOD PRESSURE: 104 MMHG

## 2018-01-20 DIAGNOSIS — R05.9 COUGH: Primary | ICD-10-CM

## 2018-01-20 LAB
ALBUMIN SERPL-MCNC: 3.9 G/DL (ref 3.5–5.2)
ALBUMIN/GLOB SERPL: 1.4 G/DL
ALP SERPL-CCNC: 106 U/L (ref 39–117)
ALT SERPL W P-5'-P-CCNC: 24 U/L (ref 1–41)
ANION GAP SERPL CALCULATED.3IONS-SCNC: 12.6 MMOL/L
AST SERPL-CCNC: 21 U/L (ref 1–40)
BASOPHILS # BLD AUTO: 0.06 10*3/MM3 (ref 0–0.2)
BASOPHILS NFR BLD AUTO: 0.7 % (ref 0–1.5)
BILIRUB SERPL-MCNC: 0.9 MG/DL (ref 0.1–1.2)
BUN BLD-MCNC: 14 MG/DL (ref 6–20)
BUN/CREAT SERPL: 12.3 (ref 7–25)
CALCIUM SPEC-SCNC: 9.4 MG/DL (ref 8.6–10.5)
CHLORIDE SERPL-SCNC: 102 MMOL/L (ref 98–107)
CO2 SERPL-SCNC: 27.4 MMOL/L (ref 22–29)
CREAT BLD-MCNC: 1.14 MG/DL (ref 0.76–1.27)
DEPRECATED RDW RBC AUTO: 43.8 FL (ref 37–54)
EOSINOPHIL # BLD AUTO: 0.2 10*3/MM3 (ref 0–0.7)
EOSINOPHIL NFR BLD AUTO: 2.2 % (ref 0.3–6.2)
ERYTHROCYTE [DISTWIDTH] IN BLOOD BY AUTOMATED COUNT: 13 % (ref 11.5–14.5)
GFR SERPL CREATININE-BSD FRML MDRD: 67 ML/MIN/1.73
GLOBULIN UR ELPH-MCNC: 2.7 GM/DL
GLUCOSE BLD-MCNC: 84 MG/DL (ref 65–99)
HCT VFR BLD AUTO: 47.3 % (ref 40.4–52.2)
HGB BLD-MCNC: 15.7 G/DL (ref 13.7–17.6)
HOLD SPECIMEN: NORMAL
HOLD SPECIMEN: NORMAL
IMM GRANULOCYTES # BLD: 0.09 10*3/MM3 (ref 0–0.03)
IMM GRANULOCYTES NFR BLD: 1 % (ref 0–0.5)
LYMPHOCYTES # BLD AUTO: 2.74 10*3/MM3 (ref 0.9–4.8)
LYMPHOCYTES NFR BLD AUTO: 29.7 % (ref 19.6–45.3)
MCH RBC QN AUTO: 31 PG (ref 27–32.7)
MCHC RBC AUTO-ENTMCNC: 33.2 G/DL (ref 32.6–36.4)
MCV RBC AUTO: 93.3 FL (ref 79.8–96.2)
MONOCYTES # BLD AUTO: 0.7 10*3/MM3 (ref 0.2–1.2)
MONOCYTES NFR BLD AUTO: 7.6 % (ref 5–12)
NEUTROPHILS # BLD AUTO: 5.43 10*3/MM3 (ref 1.9–8.1)
NEUTROPHILS NFR BLD AUTO: 58.8 % (ref 42.7–76)
NT-PROBNP SERPL-MCNC: 24.1 PG/ML (ref 0–900)
PLATELET # BLD AUTO: 219 10*3/MM3 (ref 140–500)
PMV BLD AUTO: 10.4 FL (ref 6–12)
POTASSIUM BLD-SCNC: 4.2 MMOL/L (ref 3.5–5.2)
PROT SERPL-MCNC: 6.6 G/DL (ref 6–8.5)
RBC # BLD AUTO: 5.07 10*6/MM3 (ref 4.6–6)
SODIUM BLD-SCNC: 142 MMOL/L (ref 136–145)
TROPONIN T SERPL-MCNC: <0.01 NG/ML (ref 0–0.03)
WBC NRBC COR # BLD: 9.22 10*3/MM3 (ref 4.5–10.7)
WHOLE BLOOD HOLD SPECIMEN: NORMAL
WHOLE BLOOD HOLD SPECIMEN: NORMAL

## 2018-01-20 PROCEDURE — 84484 ASSAY OF TROPONIN QUANT: CPT | Performed by: EMERGENCY MEDICINE

## 2018-01-20 PROCEDURE — 80053 COMPREHEN METABOLIC PANEL: CPT | Performed by: EMERGENCY MEDICINE

## 2018-01-20 PROCEDURE — 71046 X-RAY EXAM CHEST 2 VIEWS: CPT

## 2018-01-20 PROCEDURE — 85025 COMPLETE CBC W/AUTO DIFF WBC: CPT | Performed by: EMERGENCY MEDICINE

## 2018-01-20 PROCEDURE — 83880 ASSAY OF NATRIURETIC PEPTIDE: CPT | Performed by: EMERGENCY MEDICINE

## 2018-01-20 PROCEDURE — 99283 EMERGENCY DEPT VISIT LOW MDM: CPT

## 2018-01-20 PROCEDURE — 36415 COLL VENOUS BLD VENIPUNCTURE: CPT | Performed by: EMERGENCY MEDICINE

## 2018-01-20 RX ORDER — FLUTICASONE PROPIONATE 50 MCG
2 SPRAY, SUSPENSION (ML) NASAL DAILY
COMMUNITY
End: 2018-10-30

## 2018-01-20 RX ORDER — SODIUM CHLORIDE 0.9 % (FLUSH) 0.9 %
10 SYRINGE (ML) INJECTION AS NEEDED
Status: DISCONTINUED | OUTPATIENT
Start: 2018-01-20 | End: 2018-01-20 | Stop reason: HOSPADM

## 2018-01-20 RX ORDER — PREDNISONE 20 MG/1
20 TABLET ORAL 2 TIMES DAILY
Qty: 10 TABLET | Refills: 0 | Status: SHIPPED | OUTPATIENT
Start: 2018-01-20 | End: 2018-10-30

## 2018-01-20 RX ORDER — ALBUTEROL SULFATE 90 UG/1
2 AEROSOL, METERED RESPIRATORY (INHALATION) EVERY 4 HOURS PRN
Qty: 1 INHALER | Refills: 0 | Status: SHIPPED | OUTPATIENT
Start: 2018-01-20 | End: 2018-10-30

## 2018-01-20 RX ORDER — DEXTROMETHORPHAN HYDROBROMIDE AND PROMETHAZINE HYDROCHLORIDE 15; 6.25 MG/5ML; MG/5ML
5 SYRUP ORAL 4 TIMES DAILY PRN
Qty: 118 ML | Refills: 0 | Status: SHIPPED | OUTPATIENT
Start: 2018-01-20 | End: 2018-10-30

## 2018-01-20 RX ORDER — LORATADINE 10 MG/1
10 TABLET ORAL DAILY
COMMUNITY
End: 2018-10-30

## 2018-01-20 NOTE — ED PROVIDER NOTES
"  EMERGENCY DEPARTMENT ENCOUNTER    CHIEF COMPLAINT  Chief Complaint: Cough  History given by: Pt  History limited by: Nothing  Room Number: 42/42  PMD: Raeann Becerra MD      HPI:  Pt is a 52 y.o. male who presents with productive cough with white/ yellow sputum onset about two weeks ago.  Patient also complains of SOA secondary to his cough.  Patient denies nausea, vomiting, sore throat, headache, rhinorrhea, fever, chills. He reports he has been taking over the counter cough medicine for relief of symptoms.  Past Medical History of pulmonary embolism.    Duration: two weeks  Timing: gradual  Location: n/a  Radiation: none  Quality: \"productive\"  Intensity/Severity: moderate  Progression: unchanged  Associated Symptoms: SOA secondary to his cough  Aggravating Factors: none  Alleviating Factors: none  Previous Episodes: none  Treatment before arrival: Pt received no treatment PTA.    PAST MEDICAL HISTORY  Active Ambulatory Problems     Diagnosis Date Noted   • Bilateral pulmonary embolism 01/02/2017     Resolved Ambulatory Problems     Diagnosis Date Noted   • No Resolved Ambulatory Problems     Past Medical History:   Diagnosis Date   • Pulmonary embolism        PAST SURGICAL HISTORY  Past Surgical History:   Procedure Laterality Date   • CHOLECYSTECTOMY     • SHOULDER SURGERY Left        FAMILY HISTORY  History reviewed. No pertinent family history.    SOCIAL HISTORY  Social History     Social History   • Marital status: Single     Spouse name: N/A   • Number of children: N/A   • Years of education: N/A     Occupational History   • Not on file.     Social History Main Topics   • Smoking status: Never Smoker   • Smokeless tobacco: Not on file   • Alcohol use No   • Drug use: No   • Sexual activity: Defer     Other Topics Concern   • Not on file     Social History Narrative         ALLERGIES  Hydrocortisone    REVIEW OF SYSTEMS  Review of Systems   Constitutional: Negative for chills and fever.   HENT: " Negative for sore throat.    Respiratory: Positive for cough (productive) and shortness of breath (secondary to his cough).    Cardiovascular: Negative for chest pain.   Gastrointestinal: Negative for nausea and vomiting.   Genitourinary: Negative for dysuria.   Musculoskeletal: Negative for back pain.   Skin: Negative for rash.   Neurological: Negative for dizziness.   Psychiatric/Behavioral: The patient is not nervous/anxious.        PHYSICAL EXAM  ED Triage Vitals   Temp Heart Rate Resp BP SpO2   01/20/18 1409 01/20/18 1409 01/20/18 1414 01/20/18 1413 01/20/18 1409   98 °F (36.7 °C) 96 16 159/81 97 %       Physical Exam   Constitutional: He is well-developed, well-nourished, and in no distress. No distress.   HENT:   Head: Atraumatic.   Mouth/Throat: Uvula is midline, oropharynx is clear and moist and mucous membranes are normal.   Eyes: No scleral icterus.   Neck: Normal range of motion.   Cardiovascular: Normal rate, regular rhythm and normal heart sounds.    Pulmonary/Chest: Effort normal and breath sounds normal.   Musculoskeletal: Normal range of motion.   Neurological: He is alert.   Skin: Skin is warm and dry.   Psychiatric: Mood and affect normal.   Nursing note and vitals reviewed.      LAB RESULTS  Recent Results (from the past 24 hour(s))   Comprehensive Metabolic Panel    Collection Time: 01/20/18  2:27 PM   Result Value Ref Range    Glucose 84 65 - 99 mg/dL    BUN 14 6 - 20 mg/dL    Creatinine 1.14 0.76 - 1.27 mg/dL    Sodium 142 136 - 145 mmol/L    Potassium 4.2 3.5 - 5.2 mmol/L    Chloride 102 98 - 107 mmol/L    CO2 27.4 22.0 - 29.0 mmol/L    Calcium 9.4 8.6 - 10.5 mg/dL    Total Protein 6.6 6.0 - 8.5 g/dL    Albumin 3.90 3.50 - 5.20 g/dL    ALT (SGPT) 24 1 - 41 U/L    AST (SGOT) 21 1 - 40 U/L    Alkaline Phosphatase 106 39 - 117 U/L    Total Bilirubin 0.9 0.1 - 1.2 mg/dL    eGFR Non African Amer 67 >60 mL/min/1.73    Globulin 2.7 gm/dL    A/G Ratio 1.4 g/dL    BUN/Creatinine Ratio 12.3 7.0 - 25.0     Anion Gap 12.6 mmol/L   BNP    Collection Time: 01/20/18  2:27 PM   Result Value Ref Range    proBNP 24.1 0.0 - 900.0 pg/mL   Troponin    Collection Time: 01/20/18  2:27 PM   Result Value Ref Range    Troponin T <0.010 0.000 - 0.030 ng/mL   Light Blue Top    Collection Time: 01/20/18  2:27 PM   Result Value Ref Range    Extra Tube hold for add-on    Green Top (Gel)    Collection Time: 01/20/18  2:27 PM   Result Value Ref Range    Extra Tube Hold for add-ons.    Lavender Top    Collection Time: 01/20/18  2:27 PM   Result Value Ref Range    Extra Tube hold for add-on    Gold Top - SST    Collection Time: 01/20/18  2:27 PM   Result Value Ref Range    Extra Tube Hold for add-ons.    CBC Auto Differential    Collection Time: 01/20/18  2:27 PM   Result Value Ref Range    WBC 9.22 4.50 - 10.70 10*3/mm3    RBC 5.07 4.60 - 6.00 10*6/mm3    Hemoglobin 15.7 13.7 - 17.6 g/dL    Hematocrit 47.3 40.4 - 52.2 %    MCV 93.3 79.8 - 96.2 fL    MCH 31.0 27.0 - 32.7 pg    MCHC 33.2 32.6 - 36.4 g/dL    RDW 13.0 11.5 - 14.5 %    RDW-SD 43.8 37.0 - 54.0 fl    MPV 10.4 6.0 - 12.0 fL    Platelets 219 140 - 500 10*3/mm3    Neutrophil % 58.8 42.7 - 76.0 %    Lymphocyte % 29.7 19.6 - 45.3 %    Monocyte % 7.6 5.0 - 12.0 %    Eosinophil % 2.2 0.3 - 6.2 %    Basophil % 0.7 0.0 - 1.5 %    Immature Grans % 1.0 (H) 0.0 - 0.5 %    Neutrophils, Absolute 5.43 1.90 - 8.10 10*3/mm3    Lymphocytes, Absolute 2.74 0.90 - 4.80 10*3/mm3    Monocytes, Absolute 0.70 0.20 - 1.20 10*3/mm3    Eosinophils, Absolute 0.20 0.00 - 0.70 10*3/mm3    Basophils, Absolute 0.06 0.00 - 0.20 10*3/mm3    Immature Grans, Absolute 0.09 (H) 0.00 - 0.03 10*3/mm3       I ordered the above labs and reviewed the results    RADIOLOGY  XR Chest 2 View   Final Result   No focal pulmonary consolidation. Follow-up as clinical   indications persist.       This report was finalized on 1/20/2018 3:47 PM by Dr. Addy Mcallister MD.              I ordered the above noted radiological  studies and reviewed the images on the PACS system.        PROGRESS AND CONSULTS    1746 Informed pt of negative CXR, BNP, and WBC. Suggested pt increase his fluids and follow up with his PCP. Will discharge with an albuterol inhaler. Pt understands and agrees with treatment. All questions and concerns were addressed at this time.    1820 Reviewed pt's history and workup with Dr. Arango.  At bedside evaluation, they agree with the plan of care.    Reviewed implications of results, diagnosis, meds, responsibility to follow up, warning signs and symptoms of possible worsening, potential complications and reasons to return to ER with patient.  Discussed all results and noted any abnormalities with patient.  Discussed absolute need to recheck abnormalities with PMD      Discussed plan for discharge, as there is no emergent indication for admission.  Pt is agreeable and understands need for follow up and repeat testing.  Pt is aware that discharge does not mean that nothing is wrong but it indicates no emergency is present.  Pt is discharged with instructions to follow up with primary care doctor to have their blood pressure rechecked.       DIAGNOSIS  Final diagnoses:   Cough       FOLLOW UP   Raeann Becerra MD  [address] [address]  [phone]    Schedule an appointment as soon as possible for a visit  For primary physician follow-up      RX     Medication List      New Prescriptions          albuterol 108 (90 Base) MCG/ACT inhaler   Commonly known as:  PROVENTIL HFA;VENTOLIN HFA   Inhale 2 puffs Every 4 (Four) Hours As Needed for Wheezing.       predniSONE 20 MG tablet   Commonly known as:  DELTASONE   Take 1 tablet by mouth 2 (Two) Times a Day.       promethazine-dextromethorphan 6.25-15 MG/5ML syrup   Commonly known as:  PROMETHAZINE-DM   Take 5 mL by mouth 4 (Four) Times a Day As Needed for Cough.          COURSE & MEDICAL DECISION MAKING  Pertinent Labs and Imaging studies  "that were ordered and reviewed are noted above.  Results were reviewed/discussed with the patient and they were also made aware of online assess.   Pt also made aware that some labs, such as cultures, will not be resulted during ER visit and follow up with PMD is necessary.     MEDICATIONS GIVEN IN ER  Medications   sodium chloride 0.9 % flush 10 mL (not administered)       /92 (BP Location: Left arm, Patient Position: Sitting)  Pulse 77  Temp 98 °F (36.7 °C)  Resp 16  Ht 180.3 cm (71\")  Wt 88.5 kg (195 lb)  SpO2 98%  BMI 27.2 kg/m2      I personally reviewed the past medical history, past surgical history, social history, family history, current medications and allergies as they appear in this chart.  The scribe's note accurately reflects the work and decisions made by me.     Documentation assistance provided by roxana Walker for JACLYN Acosta on 1/20/2018 at 6:00 PM. Information recorded by the scribe was done at my direction and has been verified and validated by me.       Raman Walker  01/20/18 6309       YOLI Hill  01/20/18 1908    "

## 2018-01-20 NOTE — ED PROVIDER NOTES
Pt presents to the ED complaining of a productive cough that began two weeks ago.  Sputum is white.  Pt has taken Cipro and amoxicillin (left over from previous prescription) as well as prescribed robitussin.      On exam lungs are CTAB, Heart is RRR, Pt is no distress.  No pneumonia on CXR.    Discussed plan discharge with steroids. Pt understands and agrees with the plan, all questions answered.      I supervised care provided by the midlevel provider.    We have discussed this patient's history, physical exam, and treatment plan.   I have reviewed the note and personally saw and examined the patient and agree with the plan of care.    Documentation assistance provided by roxana Cedeno for Dr. Arango.  Information recorded by the scribe was done at my direction and has been verified and validated by me.       Rosalba Cedeno  01/20/18 4665       Shorty Arango MD  01/21/18 7677

## 2018-01-20 NOTE — ED NOTES
Pt states he has been coughing since 12/28 and passed out from it 2 weeks ago, but has not had anymore episodes of passing out, but cough remains constant even with medications      Rosalba Purvis RN  01/20/18 2344

## 2018-01-20 NOTE — ED NOTES
"Pt c/o cough and \"I think I have bronchitis\" for about two weeks.      Loree Mcfarlane RN  01/20/18 8437    "

## 2018-01-20 NOTE — DISCHARGE INSTRUCTIONS
Medications as ordered  Rest, increase fluids  Follow up with pmd in 5-7 days if symptoms not improving  Return to er for fever, chills, chest pain, shortness of air, worsening cough or any new or worsening symptoms    Cough, Adult  Introduction  A cough helps to clear your throat and lungs. A cough may last only 2-3 weeks (acute), or it may last longer than 8 weeks (chronic). Many different things can cause a cough. A cough may be a sign of an illness or another medical condition.  Follow these instructions at home:  · Pay attention to any changes in your cough.  · Take medicines only as told by your doctor.  ¨ If you were prescribed an antibiotic medicine, take it as told by your doctor. Do not stop taking it even if you start to feel better.  ¨ Talk with your doctor before you try using a cough medicine.  · Drink enough fluid to keep your pee (urine) clear or pale yellow.  · If the air is dry, use a cold steam vaporizer or humidifier in your home.  · Stay away from things that make you cough at work or at home.  · If your cough is worse at night, try using extra pillows to raise your head up higher while you sleep.  · Do not smoke, and try not to be around smoke. If you need help quitting, ask your doctor.  · Do not have caffeine.  · Do not drink alcohol.  · Rest as needed.  Contact a doctor if:  · You have new problems (symptoms).  · You cough up yellow fluid (pus).  · Your cough does not get better after 2-3 weeks, or your cough gets worse.  · Medicine does not help your cough and you are not sleeping well.  · You have pain that gets worse or pain that is not helped with medicine.  · You have a fever.  · You are losing weight and you do not know why.  · You have night sweats.  Get help right away if:  · You cough up blood.  · You have trouble breathing.  · Your heartbeat is very fast.  This information is not intended to replace advice given to you by your health care provider. Make sure you discuss any  questions you have with your health care provider.  Document Released: 08/30/2012 Document Revised: 05/25/2017 Document Reviewed: 02/24/2016  © 2017 [employer]vier

## 2018-02-20 ENCOUNTER — ON CAMPUS - OUTPATIENT (OUTPATIENT)
Dept: URBAN - METROPOLITAN AREA HOSPITAL 108 | Facility: HOSPITAL | Age: 53
End: 2018-02-20
Payer: MEDICAID

## 2018-02-20 DIAGNOSIS — K31.7 POLYP OF STOMACH AND DUODENUM: ICD-10-CM

## 2018-02-20 DIAGNOSIS — Z87.19 PERSONAL HISTORY OF OTHER DISEASES OF THE DIGESTIVE SYSTEM: ICD-10-CM

## 2018-02-20 DIAGNOSIS — K44.9 DIAPHRAGMATIC HERNIA WITHOUT OBSTRUCTION OR GANGRENE: ICD-10-CM

## 2018-02-20 DIAGNOSIS — K22.70 BARRETT'S ESOPHAGUS WITHOUT DYSPLASIA: ICD-10-CM

## 2018-02-20 DIAGNOSIS — K52.9 NONINFECTIVE GASTROENTERITIS AND COLITIS, UNSPECIFIED: ICD-10-CM

## 2018-02-20 DIAGNOSIS — K21.9 GASTRO-ESOPHAGEAL REFLUX DISEASE WITHOUT ESOPHAGITIS: ICD-10-CM

## 2018-02-20 PROCEDURE — 45380 COLONOSCOPY AND BIOPSY: CPT | Mod: PT | Performed by: INTERNAL MEDICINE

## 2018-02-20 PROCEDURE — 43251 EGD REMOVE LESION SNARE: CPT | Mod: 59 | Performed by: INTERNAL MEDICINE

## 2018-02-20 PROCEDURE — 43239 EGD BIOPSY SINGLE/MULTIPLE: CPT | Mod: 59 | Performed by: INTERNAL MEDICINE

## 2018-03-20 ENCOUNTER — ON CAMPUS - OUTPATIENT (OUTPATIENT)
Dept: URBAN - METROPOLITAN AREA HOSPITAL 108 | Facility: HOSPITAL | Age: 53
End: 2018-03-20
Payer: MEDICAID

## 2018-03-20 DIAGNOSIS — K44.9 DIAPHRAGMATIC HERNIA WITHOUT OBSTRUCTION OR GANGRENE: ICD-10-CM

## 2018-03-20 DIAGNOSIS — K22.70 BARRETT'S ESOPHAGUS WITHOUT DYSPLASIA: ICD-10-CM

## 2018-03-20 PROCEDURE — 43229 ESOPHAGOSCOPY LESION ABLATE: CPT | Performed by: INTERNAL MEDICINE

## 2018-05-15 ENCOUNTER — ON CAMPUS - OUTPATIENT (OUTPATIENT)
Dept: URBAN - METROPOLITAN AREA HOSPITAL 108 | Facility: HOSPITAL | Age: 53
End: 2018-05-15
Payer: MEDICAID

## 2018-05-15 DIAGNOSIS — K21.0 GASTRO-ESOPHAGEAL REFLUX DISEASE WITH ESOPHAGITIS: ICD-10-CM

## 2018-05-15 DIAGNOSIS — K22.8 OTHER SPECIFIED DISEASES OF ESOPHAGUS: ICD-10-CM

## 2018-05-15 DIAGNOSIS — K44.9 DIAPHRAGMATIC HERNIA WITHOUT OBSTRUCTION OR GANGRENE: ICD-10-CM

## 2018-05-15 DIAGNOSIS — K31.7 POLYP OF STOMACH AND DUODENUM: ICD-10-CM

## 2018-05-15 DIAGNOSIS — K22.10 ULCER OF ESOPHAGUS WITHOUT BLEEDING: ICD-10-CM

## 2018-05-15 DIAGNOSIS — K22.70 BARRETT'S ESOPHAGUS WITHOUT DYSPLASIA: ICD-10-CM

## 2018-05-15 PROCEDURE — 43239 EGD BIOPSY SINGLE/MULTIPLE: CPT | Performed by: INTERNAL MEDICINE

## 2018-08-01 ENCOUNTER — OFFICE (OUTPATIENT)
Dept: URBAN - METROPOLITAN AREA CLINIC 75 | Facility: CLINIC | Age: 53
End: 2018-08-01
Payer: MEDICAID

## 2018-08-01 VITALS
WEIGHT: 189 LBS | DIASTOLIC BLOOD PRESSURE: 88 MMHG | HEIGHT: 71 IN | SYSTOLIC BLOOD PRESSURE: 140 MMHG | HEART RATE: 72 BPM

## 2018-08-01 DIAGNOSIS — Z12.11 ENCOUNTER FOR SCREENING FOR MALIGNANT NEOPLASM OF COLON: ICD-10-CM

## 2018-08-01 DIAGNOSIS — K21.0 GASTRO-ESOPHAGEAL REFLUX DISEASE WITH ESOPHAGITIS: ICD-10-CM

## 2018-08-01 DIAGNOSIS — K52.9 NONINFECTIVE GASTROENTERITIS AND COLITIS, UNSPECIFIED: ICD-10-CM

## 2018-08-01 DIAGNOSIS — R12 HEARTBURN: ICD-10-CM

## 2018-08-01 DIAGNOSIS — K22.70 BARRETT'S ESOPHAGUS WITHOUT DYSPLASIA: ICD-10-CM

## 2018-08-01 DIAGNOSIS — K44.9 DIAPHRAGMATIC HERNIA WITHOUT OBSTRUCTION OR GANGRENE: ICD-10-CM

## 2018-08-01 PROCEDURE — 99213 OFFICE O/P EST LOW 20 MIN: CPT | Performed by: INTERNAL MEDICINE

## 2018-08-01 RX ORDER — OMEPRAZOLE 40 MG/1
40 CAPSULE, DELAYED RELEASE ORAL
Qty: 30 | Refills: 11 | Status: ACTIVE

## 2018-09-04 ENCOUNTER — ON CAMPUS - OUTPATIENT (OUTPATIENT)
Dept: URBAN - METROPOLITAN AREA HOSPITAL 108 | Facility: HOSPITAL | Age: 53
End: 2018-09-04
Payer: MEDICAID

## 2018-09-04 DIAGNOSIS — K44.9 DIAPHRAGMATIC HERNIA WITHOUT OBSTRUCTION OR GANGRENE: ICD-10-CM

## 2018-09-04 DIAGNOSIS — K22.70 BARRETT'S ESOPHAGUS WITHOUT DYSPLASIA: ICD-10-CM

## 2018-09-04 DIAGNOSIS — Z98.890 OTHER SPECIFIED POSTPROCEDURAL STATES: ICD-10-CM

## 2018-09-04 PROCEDURE — 43270 EGD LESION ABLATION: CPT | Mod: 52 | Performed by: INTERNAL MEDICINE

## 2018-10-30 ENCOUNTER — OFFICE VISIT (OUTPATIENT)
Dept: SURGERY | Facility: CLINIC | Age: 53
End: 2018-10-30

## 2018-10-30 VITALS
WEIGHT: 190.6 LBS | HEIGHT: 71 IN | HEART RATE: 67 BPM | BODY MASS INDEX: 26.68 KG/M2 | DIASTOLIC BLOOD PRESSURE: 80 MMHG | SYSTOLIC BLOOD PRESSURE: 130 MMHG | OXYGEN SATURATION: 98 %

## 2018-10-30 DIAGNOSIS — K44.9 HH (HIATUS HERNIA): Primary | ICD-10-CM

## 2018-10-30 PROCEDURE — 99203 OFFICE O/P NEW LOW 30 MIN: CPT | Performed by: SURGERY

## 2018-10-30 RX ORDER — OMEPRAZOLE 40 MG/1
40 CAPSULE, DELAYED RELEASE ORAL DAILY
Status: ON HOLD | COMMUNITY
End: 2019-01-03

## 2018-11-01 NOTE — PROGRESS NOTES
"Chief Complaint   Patient presents with   • Hernia     reflux        Patient is a 53 y.o. male referred by Raeann Becerra MD for evaluation a recurrent hiatal hernia and reflux.  Patient had a laparoscopic Nissen fundoplication in 2005 by me and has had increasing symptoms of reflux over the last year with evidence of a recurrent hiatal hernia.  Patient is desiring to have the hernia repaired to alleviate his symptoms.  Patient reports he has frequent reflux despite medications.  Patient denies fever, chills, nausea or vomiting.  Patient is currently on Prilosec without relief.     Past Medical History:   Diagnosis Date   • Pulmonary embolism (CMS/HCC)    • Reflux esophagitis      Past Surgical History:   Procedure Laterality Date   • CHOLECYSTECTOMY     • INGUINAL HERNIA REPAIR     • SHOULDER SURGERY Left      Family History   Problem Relation Age of Onset   • Lung disease Father      Social History   Substance Use Topics   • Smoking status: Never Smoker   • Smokeless tobacco: Never Used   • Alcohol use No     Allergies   Allergen Reactions   • Hydrocortisone Confusion     Current Outpatient Prescriptions:   •  omeprazole (priLOSEC) 40 MG capsule, Take 40 mg by mouth Daily., Disp: , Rfl:      Review of Systems   Review of Systems   HENT: Positive for voice change. Negative for trouble swallowing.    Eyes: Positive for visual disturbance.   Respiratory: Positive for cough and stridor.    Gastrointestinal: Positive for constipation and diarrhea.   Neurological: Positive for weakness.   All other systems reviewed and are negative.      Vitals:    10/30/18 1140   BP: 130/80   Pulse: 67   SpO2: 98%   Weight: 86.5 kg (190 lb 9.6 oz)   Height: 180 cm (70.87\")       Physical Exam  General/physcological:   Alert and oriented x3, in no acute distress  HEENT: Normal cephalic, atraumatic, PERRLA, EOMI, sclera anicteric, moist mucous membranes, neck is supple, no JVD, no carotid bruits, no thyromegaly no " adenopathy  Respiratory: CTA and percussion  CVA: RRR, normal S1-S2, no murmurs, no gallops or rubs  GI: Positive BS, soft, nondistended, nontender, no rebound, no guarding, no hernias, no organomegaly and no masses  Musculoskeletal: Full range of motion, no clubbing, no cyanosis or edema  Neurovascular: Grossly intact  Debilities: none  Emotional behavior: appropriate     Patient does not use tobacco products currently.  I have reviewed his previous CT scan and endoscopy reports which reveal a recurrent hiatal hernia    Assessment:  Recurrent hiatal hernia with reflux   Torres's esophagus status post halo procedure    Plan:  I have discussed repair of the recurrent hiatal hernia with mesh using the da Jerome robot and a Nissen fundoplication.  I have given him a patient teaching pamphlet.  I have discussed the risks, benefits and alternatives.  I have discussed the risk of anesthesia, bleeding, infection, surrounding organ injuries and recurrence.  Patient understands these risk, benefits and alternatives and wishes to proceed.    Qi Beltre MD  General, Minimally Invasive and Endoscopic Surgery  [id number] [facility] [address] [place] [address]   [address]    [address]  [address] [address]    P: [phone]  F: [phone]    Cc:  Raeann Becerra MD

## 2018-12-27 ENCOUNTER — APPOINTMENT (OUTPATIENT)
Dept: PREADMISSION TESTING | Facility: HOSPITAL | Age: 53
End: 2018-12-27

## 2018-12-27 VITALS
TEMPERATURE: 97.9 F | HEART RATE: 73 BPM | BODY MASS INDEX: 27.3 KG/M2 | DIASTOLIC BLOOD PRESSURE: 96 MMHG | OXYGEN SATURATION: 99 % | RESPIRATION RATE: 16 BRPM | HEIGHT: 71 IN | WEIGHT: 195 LBS | SYSTOLIC BLOOD PRESSURE: 139 MMHG

## 2018-12-27 LAB
ANION GAP SERPL CALCULATED.3IONS-SCNC: 11.4 MMOL/L
BUN BLD-MCNC: 15 MG/DL (ref 6–20)
BUN/CREAT SERPL: 15 (ref 7–25)
CALCIUM SPEC-SCNC: 8.9 MG/DL (ref 8.6–10.5)
CHLORIDE SERPL-SCNC: 104 MMOL/L (ref 98–107)
CO2 SERPL-SCNC: 23.6 MMOL/L (ref 22–29)
CREAT BLD-MCNC: 1 MG/DL (ref 0.76–1.27)
DEPRECATED RDW RBC AUTO: 45.8 FL (ref 37–54)
ERYTHROCYTE [DISTWIDTH] IN BLOOD BY AUTOMATED COUNT: 13.3 % (ref 11.5–14.5)
GFR SERPL CREATININE-BSD FRML MDRD: 78 ML/MIN/1.73
GLUCOSE BLD-MCNC: 84 MG/DL (ref 65–99)
HCT VFR BLD AUTO: 47.3 % (ref 40.4–52.2)
HGB BLD-MCNC: 15.5 G/DL (ref 13.7–17.6)
MCH RBC QN AUTO: 31.3 PG (ref 27–32.7)
MCHC RBC AUTO-ENTMCNC: 32.8 G/DL (ref 32.6–36.4)
MCV RBC AUTO: 95.6 FL (ref 79.8–96.2)
PLATELET # BLD AUTO: 195 10*3/MM3 (ref 140–500)
PMV BLD AUTO: 10.1 FL (ref 6–12)
POTASSIUM BLD-SCNC: 4 MMOL/L (ref 3.5–5.2)
RBC # BLD AUTO: 4.95 10*6/MM3 (ref 4.6–6)
SODIUM BLD-SCNC: 139 MMOL/L (ref 136–145)
WBC NRBC COR # BLD: 11.19 10*3/MM3 (ref 4.5–10.7)

## 2018-12-27 PROCEDURE — 80048 BASIC METABOLIC PNL TOTAL CA: CPT | Performed by: SURGERY

## 2018-12-27 PROCEDURE — 85027 COMPLETE CBC AUTOMATED: CPT | Performed by: SURGERY

## 2018-12-27 PROCEDURE — 36415 COLL VENOUS BLD VENIPUNCTURE: CPT

## 2018-12-27 NOTE — DISCHARGE INSTRUCTIONS
Take the following medications the morning of surgery with a small sip of water:  NONE    ARRIVE AT 0930.        General Instructions:  • Do not eat solid food after midnight the night before surgery.  • You may drink clear liquids day of surgery but must stop at least one hour before your hospital arrival time.  • It is beneficial for you to have a clear drink that contains carbohydrates the day of surgery.  We suggest a 12 to 20 ounce bottle of Gatorade or Powerade for non-diabetic patients or a 12 to 20 ounce bottle of G2 or Powerade Zero for diabetic patients. (Pediatric patients, are not advised to drink a 12 to 20 ounce carbohydrate drink)    Clear liquids are liquids you can see through.  Nothing red in color.     Plain water                               Sports drinks  Sodas                                   Gelatin (Jell-O)  Fruit juices without pulp such as white grape juice and apple juice  Popsicles that contain no fruit or yogurt  Tea or coffee (no cream or milk added)  Gatorade / Powerade  G2 / Powerade Zero    • Infants may have breast milk up to four hours before surgery.  • Infants drinking formula may drink formula up to six hours before surgery.   • Patients who avoid smoking, chewing tobacco and alcohol for 4 weeks prior to surgery have a reduced risk of post-operative complications.  Quit smoking as many days before surgery as you can.  • Do not smoke, use chewing tobacco or drink alcohol the day of surgery.   • If applicable bring your C-PAP/ BI-PAP machine.  • Bring any papers given to you in the doctor’s office.  • Wear clean comfortable clothes and socks.  • Do not wear contact lenses or make-up.  Bring a case for your glasses.   • Bring crutches or walker if applicable.  • Remove all piercings.  Leave jewelry and any other valuables at home.  • Hair extensions with metal clips must be removed prior to surgery.  • The Pre-Admission Testing nurse will instruct you to bring medications if  unable to obtain an accurate list in Pre-Admission Testing.        If you were given a blood bank ID arm band remember to bring it with you the day of surgery.    Preventing a Surgical Site Infection:  • For 2 to 3 days before surgery, avoid shaving with a razor because the razor can irritate skin and make it easier to develop an infection.    • Any areas of open skin can increase the risk of a post-operative wound infection by allowing bacteria to enter and travel throughout the body.  Notify your surgeon if you have any skin wounds / rashes even if it is not near the expected surgical site.  The area will need assessed to determine if surgery should be delayed until it is healed.  • The night prior to surgery sleep in a clean bed with clean clothing.  Do not allow pets to sleep with you.  • Shower on the morning of surgery using a fresh bar of anti-bacterial soap (such as Dial) and clean washcloth.  Dry with a clean towel and dress in clean clothing.  • Ask your surgeon if you will be receiving antibiotics prior to surgery.  • Make sure you, your family, and all healthcare providers clean their hands with soap and water or an alcohol based hand  before caring for you or your wound.    Day of surgery:  Upon arrival, a Pre-op nurse and Anesthesiologist will review your health history, obtain vital signs, and answer questions you may have.  The only belongings needed at this time will be your home medications and if applicable your C-PAP/BI-PAP machine.  If you are staying overnight your family can leave the rest of your belongings in the car and bring them to your room later.  A Pre-op nurse will start an IV and you may receive medication in preparation for surgery, including something to help you relax.  Your family will be able to see you in the Pre-op area.  While you are in surgery your family should notify the waiting room  if they leave the waiting room area and provide a contact phone  number.    Please be aware that surgery does come with discomfort.  We want to make every effort to control your discomfort so please discuss any uncontrolled symptoms with your nurse.   Your doctor will most likely have prescribed pain medications.      If you are going home after surgery you will receive individualized written care instructions before being discharged.  A responsible adult must drive you to and from the hospital on the day of your surgery and stay with you for 24 hours.    If you are staying overnight following surgery, you will be transported to your hospital room following the recovery period.  [facility] has all private rooms.    You have received a list of surgical assistants for your reference.  If you have any questions please call Pre-Admission Testing at [phone].  Deductibles and co-payments are collected on the day of service. Please be prepared to pay the required co-pay, deductible or deposit on the day of service as defined by your plan.

## 2018-12-28 ENCOUNTER — APPOINTMENT (OUTPATIENT)
Dept: PREADMISSION TESTING | Facility: HOSPITAL | Age: 53
End: 2018-12-28

## 2019-01-02 ENCOUNTER — ANESTHESIA EVENT (OUTPATIENT)
Dept: PERIOP | Facility: HOSPITAL | Age: 54
End: 2019-01-02

## 2019-01-03 ENCOUNTER — ANESTHESIA (OUTPATIENT)
Dept: PERIOP | Facility: HOSPITAL | Age: 54
End: 2019-01-03

## 2019-01-03 ENCOUNTER — HOSPITAL ENCOUNTER (OUTPATIENT)
Facility: HOSPITAL | Age: 54
Discharge: HOME | End: 2019-01-04
Attending: SURGERY | Admitting: SURGERY

## 2019-01-03 DIAGNOSIS — K44.9 HH (HIATUS HERNIA): ICD-10-CM

## 2019-01-03 PROBLEM — K21.00 HIATAL HERNIA WITH GASTROESOPHAGEAL REFLUX DISEASE AND ESOPHAGITIS: Status: ACTIVE | Noted: 2019-01-03

## 2019-01-03 PROCEDURE — 25010000002 SUCCINYLCHOLINE PER 20 MG: Performed by: NURSE ANESTHETIST, CERTIFIED REGISTERED

## 2019-01-03 PROCEDURE — 94799 UNLISTED PULMONARY SVC/PX: CPT

## 2019-01-03 PROCEDURE — 25010000002 DEXAMETHASONE PER 1 MG: Performed by: NURSE ANESTHETIST, CERTIFIED REGISTERED

## 2019-01-03 PROCEDURE — 25010000002 KETOROLAC TROMETHAMINE PER 15 MG: Performed by: SURGERY

## 2019-01-03 PROCEDURE — C1781 MESH (IMPLANTABLE): HCPCS | Performed by: SURGERY

## 2019-01-03 PROCEDURE — 25010000002 HYDROMORPHONE PER 4 MG: Performed by: NURSE ANESTHETIST, CERTIFIED REGISTERED

## 2019-01-03 PROCEDURE — 25010000003 CEFAZOLIN IN DEXTROSE 2-4 GM/100ML-% SOLUTION: Performed by: SURGERY

## 2019-01-03 PROCEDURE — 25010000002 FENTANYL CITRATE (PF) 100 MCG/2ML SOLUTION: Performed by: NURSE ANESTHETIST, CERTIFIED REGISTERED

## 2019-01-03 PROCEDURE — 25010000002 MIDAZOLAM PER 1 MG: Performed by: ANESTHESIOLOGY

## 2019-01-03 PROCEDURE — 25010000002 PROPOFOL 10 MG/ML EMULSION: Performed by: NURSE ANESTHETIST, CERTIFIED REGISTERED

## 2019-01-03 PROCEDURE — 43282 LAP PARAESOPH HER RPR W/MESH: CPT | Performed by: SURGERY

## 2019-01-03 PROCEDURE — 25010000002 ONDANSETRON PER 1 MG: Performed by: NURSE ANESTHETIST, CERTIFIED REGISTERED

## 2019-01-03 PROCEDURE — S0260 H&P FOR SURGERY: HCPCS | Performed by: SURGERY

## 2019-01-03 PROCEDURE — 25010000002 KETOROLAC TROMETHAMINE PER 15 MG: Performed by: NURSE ANESTHETIST, CERTIFIED REGISTERED

## 2019-01-03 DEVICE — MESH TISS REINFORCEMENT BIO/A 7X10CM: Type: IMPLANTABLE DEVICE | Site: STOMACH | Status: FUNCTIONAL

## 2019-01-03 RX ORDER — MIDAZOLAM HYDROCHLORIDE 1 MG/ML
2 INJECTION INTRAMUSCULAR; INTRAVENOUS
Status: DISCONTINUED | OUTPATIENT
Start: 2019-01-03 | End: 2019-01-03 | Stop reason: HOSPADM

## 2019-01-03 RX ORDER — FENTANYL CITRATE 50 UG/ML
50 INJECTION, SOLUTION INTRAMUSCULAR; INTRAVENOUS
Status: DISCONTINUED | OUTPATIENT
Start: 2019-01-03 | End: 2019-01-03 | Stop reason: HOSPADM

## 2019-01-03 RX ORDER — HYDRALAZINE HYDROCHLORIDE 20 MG/ML
5 INJECTION INTRAMUSCULAR; INTRAVENOUS
Status: DISCONTINUED | OUTPATIENT
Start: 2019-01-03 | End: 2019-01-03 | Stop reason: HOSPADM

## 2019-01-03 RX ORDER — DEXAMETHASONE SODIUM PHOSPHATE 10 MG/ML
INJECTION INTRAMUSCULAR; INTRAVENOUS AS NEEDED
Status: DISCONTINUED | OUTPATIENT
Start: 2019-01-03 | End: 2019-01-03 | Stop reason: SURG

## 2019-01-03 RX ORDER — ONDANSETRON 4 MG/1
4 TABLET, ORALLY DISINTEGRATING ORAL EVERY 6 HOURS PRN
Status: DISCONTINUED | OUTPATIENT
Start: 2019-01-03 | End: 2019-01-04 | Stop reason: HOSPADM

## 2019-01-03 RX ORDER — NALOXONE HCL 0.4 MG/ML
0.2 VIAL (ML) INJECTION AS NEEDED
Status: DISCONTINUED | OUTPATIENT
Start: 2019-01-03 | End: 2019-01-03 | Stop reason: HOSPADM

## 2019-01-03 RX ORDER — PROPOFOL 10 MG/ML
VIAL (ML) INTRAVENOUS AS NEEDED
Status: DISCONTINUED | OUTPATIENT
Start: 2019-01-03 | End: 2019-01-03 | Stop reason: SURG

## 2019-01-03 RX ORDER — HYDROMORPHONE HYDROCHLORIDE 1 MG/ML
0.5 INJECTION, SOLUTION INTRAMUSCULAR; INTRAVENOUS; SUBCUTANEOUS
Status: DISCONTINUED | OUTPATIENT
Start: 2019-01-03 | End: 2019-01-04 | Stop reason: HOSPADM

## 2019-01-03 RX ORDER — ACETAMINOPHEN 160 MG/5ML
650 SOLUTION ORAL EVERY 4 HOURS PRN
Status: DISCONTINUED | OUTPATIENT
Start: 2019-01-03 | End: 2019-01-04 | Stop reason: HOSPADM

## 2019-01-03 RX ORDER — HYDROMORPHONE HCL 110MG/55ML
PATIENT CONTROLLED ANALGESIA SYRINGE INTRAVENOUS AS NEEDED
Status: DISCONTINUED | OUTPATIENT
Start: 2019-01-03 | End: 2019-01-03 | Stop reason: SURG

## 2019-01-03 RX ORDER — CEFAZOLIN SODIUM 2 G/100ML
2 INJECTION, SOLUTION INTRAVENOUS ONCE
Status: COMPLETED | OUTPATIENT
Start: 2019-01-03 | End: 2019-01-03

## 2019-01-03 RX ORDER — ONDANSETRON 2 MG/ML
INJECTION INTRAMUSCULAR; INTRAVENOUS AS NEEDED
Status: DISCONTINUED | OUTPATIENT
Start: 2019-01-03 | End: 2019-01-03 | Stop reason: SURG

## 2019-01-03 RX ORDER — ONDANSETRON 2 MG/ML
4 INJECTION INTRAMUSCULAR; INTRAVENOUS EVERY 6 HOURS PRN
Status: DISCONTINUED | OUTPATIENT
Start: 2019-01-03 | End: 2019-01-04 | Stop reason: HOSPADM

## 2019-01-03 RX ORDER — KETOROLAC TROMETHAMINE 30 MG/ML
30 INJECTION, SOLUTION INTRAMUSCULAR; INTRAVENOUS EVERY 8 HOURS
Status: DISCONTINUED | OUTPATIENT
Start: 2019-01-03 | End: 2019-01-04 | Stop reason: HOSPADM

## 2019-01-03 RX ORDER — DEXTROSE, SODIUM CHLORIDE, AND POTASSIUM CHLORIDE 5; .45; .15 G/100ML; G/100ML; G/100ML
100 INJECTION INTRAVENOUS CONTINUOUS
Status: DISCONTINUED | OUTPATIENT
Start: 2019-01-03 | End: 2019-01-04 | Stop reason: HOSPADM

## 2019-01-03 RX ORDER — PROMETHAZINE HYDROCHLORIDE 25 MG/ML
12.5 INJECTION, SOLUTION INTRAMUSCULAR; INTRAVENOUS ONCE AS NEEDED
Status: DISCONTINUED | OUTPATIENT
Start: 2019-01-03 | End: 2019-01-03 | Stop reason: HOSPADM

## 2019-01-03 RX ORDER — SODIUM CHLORIDE 0.9 % (FLUSH) 0.9 %
3-10 SYRINGE (ML) INJECTION AS NEEDED
Status: DISCONTINUED | OUTPATIENT
Start: 2019-01-03 | End: 2019-01-03 | Stop reason: HOSPADM

## 2019-01-03 RX ORDER — BUPIVACAINE HYDROCHLORIDE AND EPINEPHRINE 2.5; 5 MG/ML; UG/ML
INJECTION, SOLUTION INFILTRATION; PERINEURAL AS NEEDED
Status: DISCONTINUED | OUTPATIENT
Start: 2019-01-03 | End: 2019-01-03 | Stop reason: HOSPADM

## 2019-01-03 RX ORDER — NALOXONE HCL 0.4 MG/ML
0.1 VIAL (ML) INJECTION
Status: DISCONTINUED | OUTPATIENT
Start: 2019-01-03 | End: 2019-01-04 | Stop reason: HOSPADM

## 2019-01-03 RX ORDER — ROCURONIUM BROMIDE 10 MG/ML
INJECTION, SOLUTION INTRAVENOUS AS NEEDED
Status: DISCONTINUED | OUTPATIENT
Start: 2019-01-03 | End: 2019-01-03 | Stop reason: SURG

## 2019-01-03 RX ORDER — SODIUM CHLORIDE 0.9 % (FLUSH) 0.9 %
3 SYRINGE (ML) INJECTION EVERY 12 HOURS SCHEDULED
Status: DISCONTINUED | OUTPATIENT
Start: 2019-01-03 | End: 2019-01-03 | Stop reason: HOSPADM

## 2019-01-03 RX ORDER — ONDANSETRON 4 MG/1
4 TABLET, FILM COATED ORAL EVERY 6 HOURS PRN
Status: DISCONTINUED | OUTPATIENT
Start: 2019-01-03 | End: 2019-01-04 | Stop reason: HOSPADM

## 2019-01-03 RX ORDER — KETOROLAC TROMETHAMINE 30 MG/ML
INJECTION, SOLUTION INTRAMUSCULAR; INTRAVENOUS AS NEEDED
Status: DISCONTINUED | OUTPATIENT
Start: 2019-01-03 | End: 2019-01-03 | Stop reason: SURG

## 2019-01-03 RX ORDER — FLUMAZENIL 0.1 MG/ML
0.2 INJECTION INTRAVENOUS AS NEEDED
Status: DISCONTINUED | OUTPATIENT
Start: 2019-01-03 | End: 2019-01-03 | Stop reason: HOSPADM

## 2019-01-03 RX ORDER — LABETALOL HYDROCHLORIDE 5 MG/ML
5 INJECTION, SOLUTION INTRAVENOUS
Status: DISCONTINUED | OUTPATIENT
Start: 2019-01-03 | End: 2019-01-03 | Stop reason: HOSPADM

## 2019-01-03 RX ORDER — ONDANSETRON 2 MG/ML
4 INJECTION INTRAMUSCULAR; INTRAVENOUS ONCE AS NEEDED
Status: DISCONTINUED | OUTPATIENT
Start: 2019-01-03 | End: 2019-01-03 | Stop reason: HOSPADM

## 2019-01-03 RX ORDER — HYDROCODONE BITARTRATE AND ACETAMINOPHEN 7.5; 325 MG/1; MG/1
1 TABLET ORAL ONCE AS NEEDED
Status: DISCONTINUED | OUTPATIENT
Start: 2019-01-03 | End: 2019-01-03 | Stop reason: HOSPADM

## 2019-01-03 RX ORDER — ACETAMINOPHEN 650 MG/1
650 SUPPOSITORY RECTAL EVERY 4 HOURS PRN
Status: DISCONTINUED | OUTPATIENT
Start: 2019-01-03 | End: 2019-01-04 | Stop reason: HOSPADM

## 2019-01-03 RX ORDER — LIDOCAINE HYDROCHLORIDE 20 MG/ML
INJECTION, SOLUTION INFILTRATION; PERINEURAL AS NEEDED
Status: DISCONTINUED | OUTPATIENT
Start: 2019-01-03 | End: 2019-01-03 | Stop reason: SURG

## 2019-01-03 RX ORDER — HYDROMORPHONE HYDROCHLORIDE 1 MG/ML
0.5 INJECTION, SOLUTION INTRAMUSCULAR; INTRAVENOUS; SUBCUTANEOUS
Status: DISCONTINUED | OUTPATIENT
Start: 2019-01-03 | End: 2019-01-03 | Stop reason: HOSPADM

## 2019-01-03 RX ORDER — OXYCODONE AND ACETAMINOPHEN 10; 325 MG/1; MG/1
1 TABLET ORAL EVERY 4 HOURS PRN
Status: DISCONTINUED | OUTPATIENT
Start: 2019-01-03 | End: 2019-01-04 | Stop reason: HOSPADM

## 2019-01-03 RX ORDER — SUCCINYLCHOLINE CHLORIDE 20 MG/ML
INJECTION INTRAMUSCULAR; INTRAVENOUS AS NEEDED
Status: DISCONTINUED | OUTPATIENT
Start: 2019-01-03 | End: 2019-01-03 | Stop reason: SURG

## 2019-01-03 RX ORDER — SODIUM CHLORIDE, SODIUM LACTATE, POTASSIUM CHLORIDE, CALCIUM CHLORIDE 600; 310; 30; 20 MG/100ML; MG/100ML; MG/100ML; MG/100ML
9 INJECTION, SOLUTION INTRAVENOUS CONTINUOUS
Status: DISCONTINUED | OUTPATIENT
Start: 2019-01-03 | End: 2019-01-04 | Stop reason: HOSPADM

## 2019-01-03 RX ORDER — OXYCODONE HYDROCHLORIDE AND ACETAMINOPHEN 5; 325 MG/1; MG/1
1 TABLET ORAL EVERY 4 HOURS PRN
Status: DISCONTINUED | OUTPATIENT
Start: 2019-01-03 | End: 2019-01-04 | Stop reason: HOSPADM

## 2019-01-03 RX ORDER — MIDAZOLAM HYDROCHLORIDE 1 MG/ML
1 INJECTION INTRAMUSCULAR; INTRAVENOUS
Status: DISCONTINUED | OUTPATIENT
Start: 2019-01-03 | End: 2019-01-03 | Stop reason: HOSPADM

## 2019-01-03 RX ORDER — PROMETHAZINE HYDROCHLORIDE 25 MG/1
25 TABLET ORAL ONCE AS NEEDED
Status: DISCONTINUED | OUTPATIENT
Start: 2019-01-03 | End: 2019-01-03 | Stop reason: HOSPADM

## 2019-01-03 RX ORDER — OXYCODONE AND ACETAMINOPHEN 7.5; 325 MG/1; MG/1
1 TABLET ORAL ONCE AS NEEDED
Status: DISCONTINUED | OUTPATIENT
Start: 2019-01-03 | End: 2019-01-03 | Stop reason: HOSPADM

## 2019-01-03 RX ORDER — FAMOTIDINE 10 MG/ML
20 INJECTION, SOLUTION INTRAVENOUS ONCE
Status: COMPLETED | OUTPATIENT
Start: 2019-01-03 | End: 2019-01-03

## 2019-01-03 RX ORDER — FENTANYL CITRATE 50 UG/ML
INJECTION, SOLUTION INTRAMUSCULAR; INTRAVENOUS AS NEEDED
Status: DISCONTINUED | OUTPATIENT
Start: 2019-01-03 | End: 2019-01-03 | Stop reason: SURG

## 2019-01-03 RX ORDER — ACETAMINOPHEN 325 MG/1
650 TABLET ORAL EVERY 4 HOURS PRN
Status: DISCONTINUED | OUTPATIENT
Start: 2019-01-03 | End: 2019-01-04 | Stop reason: HOSPADM

## 2019-01-03 RX ORDER — PROMETHAZINE HYDROCHLORIDE 25 MG/1
25 SUPPOSITORY RECTAL ONCE AS NEEDED
Status: DISCONTINUED | OUTPATIENT
Start: 2019-01-03 | End: 2019-01-03 | Stop reason: HOSPADM

## 2019-01-03 RX ORDER — DIPHENHYDRAMINE HYDROCHLORIDE 50 MG/ML
12.5 INJECTION INTRAMUSCULAR; INTRAVENOUS
Status: DISCONTINUED | OUTPATIENT
Start: 2019-01-03 | End: 2019-01-03 | Stop reason: HOSPADM

## 2019-01-03 RX ORDER — ACETAMINOPHEN 325 MG/1
650 TABLET ORAL ONCE AS NEEDED
Status: DISCONTINUED | OUTPATIENT
Start: 2019-01-03 | End: 2019-01-03 | Stop reason: HOSPADM

## 2019-01-03 RX ORDER — SODIUM CHLORIDE 9 MG/ML
INJECTION, SOLUTION INTRAVENOUS AS NEEDED
Status: DISCONTINUED | OUTPATIENT
Start: 2019-01-03 | End: 2019-01-03 | Stop reason: HOSPADM

## 2019-01-03 RX ORDER — EPHEDRINE SULFATE 50 MG/ML
5 INJECTION, SOLUTION INTRAVENOUS ONCE AS NEEDED
Status: DISCONTINUED | OUTPATIENT
Start: 2019-01-03 | End: 2019-01-03 | Stop reason: HOSPADM

## 2019-01-03 RX ORDER — DIPHENHYDRAMINE HCL 25 MG
25 CAPSULE ORAL
Status: DISCONTINUED | OUTPATIENT
Start: 2019-01-03 | End: 2019-01-03 | Stop reason: HOSPADM

## 2019-01-03 RX ADMIN — HYDROMORPHONE HYDROCHLORIDE 0.5 MG: 2 INJECTION INTRAMUSCULAR; INTRAVENOUS; SUBCUTANEOUS at 14:19

## 2019-01-03 RX ADMIN — SUGAMMADEX 200 MG: 100 INJECTION, SOLUTION INTRAVENOUS at 13:56

## 2019-01-03 RX ADMIN — FENTANYL CITRATE 100 MCG: 50 INJECTION INTRAMUSCULAR; INTRAVENOUS at 11:29

## 2019-01-03 RX ADMIN — SODIUM CHLORIDE, POTASSIUM CHLORIDE, SODIUM LACTATE AND CALCIUM CHLORIDE 9 ML/HR: 600; 310; 30; 20 INJECTION, SOLUTION INTRAVENOUS at 10:27

## 2019-01-03 RX ADMIN — SUCCINYLCHOLINE CHLORIDE 140 MG: 20 INJECTION, SOLUTION INTRAMUSCULAR; INTRAVENOUS; PARENTERAL at 11:29

## 2019-01-03 RX ADMIN — PROPOFOL 200 MG: 10 INJECTION, EMULSION INTRAVENOUS at 11:29

## 2019-01-03 RX ADMIN — ROCURONIUM BROMIDE 40 MG: 10 INJECTION INTRAVENOUS at 11:40

## 2019-01-03 RX ADMIN — ONDANSETRON 4 MG: 2 INJECTION INTRAMUSCULAR; INTRAVENOUS at 13:52

## 2019-01-03 RX ADMIN — DEXAMETHASONE SODIUM PHOSPHATE 8 MG: 10 INJECTION INTRAMUSCULAR; INTRAVENOUS at 12:05

## 2019-01-03 RX ADMIN — ROCURONIUM BROMIDE 20 MG: 10 INJECTION INTRAVENOUS at 12:55

## 2019-01-03 RX ADMIN — Medication 3 ML: at 17:52

## 2019-01-03 RX ADMIN — FENTANYL CITRATE 50 MCG: 50 INJECTION INTRAMUSCULAR; INTRAVENOUS at 11:47

## 2019-01-03 RX ADMIN — MIDAZOLAM HYDROCHLORIDE 1 MG: 2 INJECTION, SOLUTION INTRAMUSCULAR; INTRAVENOUS at 10:28

## 2019-01-03 RX ADMIN — FAMOTIDINE 20 MG: 10 INJECTION INTRAVENOUS at 10:27

## 2019-01-03 RX ADMIN — KETOROLAC TROMETHAMINE 30 MG: 30 INJECTION, SOLUTION INTRAMUSCULAR; INTRAVENOUS at 13:52

## 2019-01-03 RX ADMIN — SODIUM CHLORIDE, POTASSIUM CHLORIDE, SODIUM LACTATE AND CALCIUM CHLORIDE: 600; 310; 30; 20 INJECTION, SOLUTION INTRAVENOUS at 12:40

## 2019-01-03 RX ADMIN — FENTANYL CITRATE 50 MCG: 50 INJECTION INTRAMUSCULAR; INTRAVENOUS at 12:02

## 2019-01-03 RX ADMIN — OXYCODONE HYDROCHLORIDE AND ACETAMINOPHEN 1 TABLET: 10; 325 TABLET ORAL at 17:48

## 2019-01-03 RX ADMIN — CEFAZOLIN SODIUM 2 G: 2 INJECTION, SOLUTION INTRAVENOUS at 11:32

## 2019-01-03 RX ADMIN — KETOROLAC TROMETHAMINE 30 MG: 30 INJECTION, SOLUTION INTRAMUSCULAR at 22:33

## 2019-01-03 RX ADMIN — OXYCODONE HYDROCHLORIDE AND ACETAMINOPHEN 1 TABLET: 10; 325 TABLET ORAL at 21:33

## 2019-01-03 RX ADMIN — LIDOCAINE HYDROCHLORIDE 100 MG: 20 INJECTION, SOLUTION INFILTRATION; PERINEURAL at 11:29

## 2019-01-03 RX ADMIN — HYDROMORPHONE HYDROCHLORIDE 0.5 MG: 2 INJECTION INTRAMUSCULAR; INTRAVENOUS; SUBCUTANEOUS at 14:05

## 2019-01-03 RX ADMIN — ROCURONIUM BROMIDE 10 MG: 10 INJECTION INTRAVENOUS at 11:29

## 2019-01-03 RX ADMIN — POTASSIUM CHLORIDE, DEXTROSE MONOHYDRATE AND SODIUM CHLORIDE 100 ML/HR: 150; 5; 450 INJECTION, SOLUTION INTRAVENOUS at 17:48

## 2019-01-03 NOTE — PLAN OF CARE
Problem: Patient Care Overview  Goal: Plan of Care Review  Outcome: Ongoing (interventions implemented as appropriate)   01/03/19 1714 01/03/19 2290   Plan of Care Review   Progress --  improving   OTHER   Outcome Summary --  Pt. remains A&Ox4 since admission from PACU. 6 lap sites dermabond and GERRI. Percocet given X1 for pain. VSS, will continue to monitor.   Coping/Psychosocial   Plan of Care Reviewed With patient;family --

## 2019-01-03 NOTE — ANESTHESIA POSTPROCEDURE EVALUATION
"Patient: Oliverio Corrales    Procedure Summary     Date:  01/03/19 Room / Location:  [facility] OR 08 / [facility] MAIN OR    Anesthesia Start:  1123 Anesthesia Stop:  1426    Procedure:  REDO LAPAROSCOPIC NISSEN FUNDOPLICATION  WITH DAVINCI ROBOT AND MESH (N/A Abdomen) Diagnosis:       HH (hiatus hernia)      (HH (hiatus hernia) [K44.9])    Surgeon:  Qi Beltre MD Provider:  Tasha Segura MD    Anesthesia Type:  general ASA Status:  3          Anesthesia Type: general  Last vitals  BP   139/89 (01/03/19 1530)   Temp   36.4 °C (97.6 °F) (01/03/19 1420)   Pulse   89 (01/03/19 1530)   Resp   16 (01/03/19 1530)     SpO2   98 % (01/03/19 1530)     Post Anesthesia Care and Evaluation    Patient location during evaluation: bedside  Patient participation: complete - patient participated  Level of consciousness: awake and alert  Pain management: adequate  Airway patency: patent  Anesthetic complications: No anesthetic complications  PONV Status: none  Cardiovascular status: acceptable  Respiratory status: acceptable  Hydration status: acceptable    Comments: /89   Pulse 89   Temp 36.4 °C (97.6 °F) (Oral)   Resp 16   Ht 180.3 cm (71\")   Wt 86.8 kg (191 lb 5.8 oz)   SpO2 98%   BMI 26.69 kg/m²         "

## 2019-01-03 NOTE — H&P
"Chief Complaint   Patient presents with   • Hernia       reflux         Patient is a 53 y.o. male referred by Raeann Becerra MD for evaluation a recurrent hiatal hernia and reflux.  Patient had a laparoscopic Nissen fundoplication in 2005 by me and has had increasing symptoms of reflux over the last year with evidence of a recurrent hiatal hernia.  Patient is desiring to have the hernia repaired to alleviate his symptoms.  Patient reports he has frequent reflux despite medications.  Patient denies fever, chills, nausea or vomiting.  Patient is currently on Prilosec without relief.      Medical History        Past Medical History:   Diagnosis Date   • Pulmonary embolism (CMS/HCC)     • Reflux esophagitis           Surgical History         Past Surgical History:   Procedure Laterality Date   • CHOLECYSTECTOMY       • INGUINAL HERNIA REPAIR       • SHOULDER SURGERY Left                 Family History   Problem Relation Age of Onset   • Lung disease Father             Social History   Substance Use Topics   • Smoking status: Never Smoker   • Smokeless tobacco: Never Used   • Alcohol use No           Allergies   Allergen Reactions   • Hydrocortisone Confusion      Current Outpatient Prescriptions:   •  omeprazole (priLOSEC) 40 MG capsule, Take 40 mg by mouth Daily., Disp: , Rfl:      Review of Systems   Review of Systems   HENT: Positive for voice change. Negative for trouble swallowing.    Eyes: Positive for visual disturbance.   Respiratory: Positive for cough and stridor.    Gastrointestinal: Positive for constipation and diarrhea.   Neurological: Positive for weakness.   All other systems reviewed and are negative.     /86 (BP Location: Left arm, Patient Position: Lying)   Pulse 63   Temp 97.9 °F (36.6 °C) (Oral)   Resp 20   Ht 180.3 cm (71\")   Wt 86.8 kg (191 lb 5.8 oz)   SpO2 96% Comment: post applicatioo 2L NC  BMI 26.69 kg/m²      Physical Exam  General/physcological:   Alert and oriented x3, " in no acute distress  HEENT: Normal cephalic, atraumatic, PERRLA, EOMI, sclera anicteric, moist mucous membranes, neck is supple, no JVD, no carotid bruits, no thyromegaly no adenopathy  Respiratory: CTA and percussion  CVA: RRR, normal S1-S2, no murmurs, no gallops or rubs  GI: Positive BS, soft, nondistended, nontender, no rebound, no guarding, no hernias, no organomegaly and no masses  Musculoskeletal: Full range of motion, no clubbing, no cyanosis or edema  Neurovascular: Grossly intact  Debilities: none  Emotional behavior: appropriate      Patient does not use tobacco products currently.  I have reviewed his previous CT scan and endoscopy reports which reveal a recurrent hiatal hernia     Assessment:  Recurrent hiatal hernia with reflux   Torres's esophagus status post halo procedure     Plan:  I have discussed repair of the recurrent hiatal hernia with mesh using the da Jerome robot and a Nissen fundoplication.  I have given him a patient teaching pamphlet.  I have discussed the risks, benefits and alternatives.  I have discussed the risk of anesthesia, bleeding, infection, surrounding organ injuries and recurrence.  Patient understands these risk, benefits and alternatives and wishes to proceed.     Qi Beltre MD  General, Minimally Invasive and Endoscopic Surgery  [facility]        [address] [facility]         [address] [place] [address]                                  [address]  [address] [address]     P: [phone]  F: [phone]     Cc:  Raeann Becerra MD

## 2019-01-03 NOTE — ANESTHESIA PREPROCEDURE EVALUATION
" Anesthesia Evaluation     history of anesthetic complications (headache ):               Airway   Mallampati: II  TM distance: >3 FB  Neck ROM: full  Dental      Comment: Eye tooth in braces is inplant    Pulmonary    (+) pulmonary embolism (post op), sleep apnea (\"minor\"),   (-) shortness of breath, recent URI  Cardiovascular     (-) hypertension, dysrhythmias, angina, hyperlipidemia      Neuro/Psych  GI/Hepatic/Renal/Endo    (+)  hiatal hernia, GERD,      Musculoskeletal     Abdominal    Substance History      OB/GYN          Other                        Anesthesia Plan    ASA 3     general     intravenous induction   Anesthetic plan, all risks, benefits, and alternatives have been provided, discussed and informed consent has been obtained with: patient.      "

## 2019-01-03 NOTE — ANESTHESIA PROCEDURE NOTES
ANESTHESIA INTUBATION  Urgency: elective    Airway not difficult    General Information and Staff    Patient location during procedure: OR  Anesthesiologist: Tasha Segura MD  CRNA: Mae López CRNA    Indications and Patient Condition  Indications for airway management: airway protection    Preoxygenated: yes  MILS not maintained throughout  Mask difficulty assessment: 1 - vent by mask    Final Airway Details  Final airway type: endotracheal airway      Successful airway: ETT  Cuffed: yes   Successful intubation technique: direct laryngoscopy and RSI  Facilitating devices/methods: cricoid pressure  Endotracheal tube insertion site: oral  Blade: Cisco  Blade size: 4  ETT size (mm): 7.5  Cormack-Lehane Classification: grade I - full view of glottis  Placement verified by: chest auscultation and capnometry   Measured from: lips  ETT to lips (cm): 23  Number of attempts at approach: 1    Additional Comments  Dentition intact and unchanged. CBEBS.  +ETCO2.

## 2019-01-03 NOTE — OP NOTE
Pre-op Dx:  HH, GERD    Post-op Dx:  Same    Procedure:  1. Laparoscopic Nissen Fundoplication              2. Recurrent Hiatal Hernia Repair with Bio-A Mesh    Surgeon:  Qi Beltre MD    Assistant:  Ania Phillips    Anesthesia:  GET    Specimens:  None    EBL:  Minium     Complications: none    Findings: large HH    Indications: This is a pleasant 53 y.o. male patient that has a documented large HH with GERD and esophagitis.    Procedure:  After general endotracheal anesthesia, patient was prepped and draped in the usual sterile fashion.  A midline incision was performed midway between the subxiphoid process and umbilicus with an 11 blade scalpel and a Veress needle was inserted.  The abdomen was insufflated 15 mmHg.  The abdomen was then entered under direct visualization using a 0° camera and a 12 mm Optiview trocar.  Upon entering the abdomen, a 5 mm trocar was placed in the subxiphoid area and the liver was retractor cephalad.  A 8 mm da Jerome port was placed in the left upper quadrant and right upper quadrant under direct visualization.  An 8 mm da Jerome trocar port was also placed in the left mid quadrant.  8 mm assist port was placed in the right lower quadrant.  Patient was positioned in reverse Trendelenburg position.  The robotic arms were then docked and the remainder of the procedure was carried out from the robotic console.  The third robotic arm was used to retract the omentum laterally with a double fenestrated grasper.  The omentum was freed from the greater curvature of the fundus using the vessel sealer.  The short gastrics were divided using the vessel sealer.  The left crura ligament was identified and  from the hiatal hernia sac.  The stomach was then reflected toward the left side and the hepatogastric ligament was divided using the vessel sealer.  The right crura was then visualized and freed from the hiatal hernia sac.  The hiatal hernia sac was removed from the posterior  esophagus and a Penrose was placed for caudad retraction of the esophagus.  The esophagus was dissected free from the hernia sac.  The anterior and posterior vagus nerves were identified for preservation throughout the course of the dissection.  A good 2-3 cm intra-abdominal esophageal length was obtained without any recoil.  The hiatal hernia defect was closed using a figure-of-eight, interrupted, 0 Nurolon suture approximating the left and right crura.  Bio-A mesh was placed over the repair and secured with 0 Nurolon sutures.  A 2 centimeter, 360 degree fundoplication was created using 0 Nurolon sutures in interrupted fashion.  The wrap was created from the short gastric- short gastric using the full length of the fundus.  The Penrose was removed and there was no evidence of any recoil of the esophagus.  All needles and sponge counts were correct ×2.  There was no evidence of any intra-abdominal bleeding.  All trochars were removed under direct visualization.  All incisions were infiltrated with quarter percent Marcaine and epinephrine and closed with 4-0 Vicryl.  Sterile dressings were applied and the patient was transferred to recovery room in stable condition.    Qi Beltre MD  General, Minimally Invasive and Endoscopic Surgery  [facility]    [address] [facility] [address] [place] [address]    [address]  [address] [address]    P: [phone]  F: [phone]    Cc:  Raeann Becerra MD

## 2019-01-04 VITALS
OXYGEN SATURATION: 94 % | TEMPERATURE: 99.5 F | SYSTOLIC BLOOD PRESSURE: 122 MMHG | DIASTOLIC BLOOD PRESSURE: 84 MMHG | WEIGHT: 191.36 LBS | HEART RATE: 65 BPM | RESPIRATION RATE: 18 BRPM | BODY MASS INDEX: 26.79 KG/M2 | HEIGHT: 71 IN

## 2019-01-04 LAB
ANION GAP SERPL CALCULATED.3IONS-SCNC: 11 MMOL/L
BASOPHILS # BLD AUTO: 0.01 10*3/MM3 (ref 0–0.2)
BASOPHILS NFR BLD AUTO: 0.1 % (ref 0–1.5)
BUN BLD-MCNC: 20 MG/DL (ref 6–20)
BUN/CREAT SERPL: 18.9 (ref 7–25)
CALCIUM SPEC-SCNC: 8.6 MG/DL (ref 8.6–10.5)
CHLORIDE SERPL-SCNC: 102 MMOL/L (ref 98–107)
CO2 SERPL-SCNC: 24 MMOL/L (ref 22–29)
CREAT BLD-MCNC: 1.06 MG/DL (ref 0.76–1.27)
DEPRECATED RDW RBC AUTO: 42.3 FL (ref 37–54)
EOSINOPHIL # BLD AUTO: 0 10*3/MM3 (ref 0–0.7)
EOSINOPHIL NFR BLD AUTO: 0 % (ref 0.3–6.2)
ERYTHROCYTE [DISTWIDTH] IN BLOOD BY AUTOMATED COUNT: 13 % (ref 11.5–14.5)
GFR SERPL CREATININE-BSD FRML MDRD: 73 ML/MIN/1.73
GLUCOSE BLD-MCNC: 144 MG/DL (ref 65–99)
HCT VFR BLD AUTO: 41 % (ref 40.4–52.2)
HGB BLD-MCNC: 14 G/DL (ref 13.7–17.6)
IMM GRANULOCYTES # BLD AUTO: 0.03 10*3/MM3 (ref 0–0.03)
IMM GRANULOCYTES NFR BLD AUTO: 0.2 % (ref 0–0.5)
LYMPHOCYTES # BLD AUTO: 1.29 10*3/MM3 (ref 0.9–4.8)
LYMPHOCYTES NFR BLD AUTO: 9.6 % (ref 19.6–45.3)
MCH RBC QN AUTO: 30.6 PG (ref 27–32.7)
MCHC RBC AUTO-ENTMCNC: 34.1 G/DL (ref 32.6–36.4)
MCV RBC AUTO: 89.5 FL (ref 79.8–96.2)
MONOCYTES # BLD AUTO: 1.38 10*3/MM3 (ref 0.2–1.2)
MONOCYTES NFR BLD AUTO: 10.3 % (ref 5–12)
NEUTROPHILS # BLD AUTO: 10.71 10*3/MM3 (ref 1.9–8.1)
NEUTROPHILS NFR BLD AUTO: 80 % (ref 42.7–76)
PLATELET # BLD AUTO: 180 10*3/MM3 (ref 140–500)
PMV BLD AUTO: 9.9 FL (ref 6–12)
POTASSIUM BLD-SCNC: 4.6 MMOL/L (ref 3.5–5.2)
RBC # BLD AUTO: 4.58 10*6/MM3 (ref 4.6–6)
SODIUM BLD-SCNC: 137 MMOL/L (ref 136–145)
WBC NRBC COR # BLD: 13.39 10*3/MM3 (ref 4.5–10.7)

## 2019-01-04 PROCEDURE — 25010000002 KETOROLAC TROMETHAMINE PER 15 MG: Performed by: SURGERY

## 2019-01-04 PROCEDURE — 85025 COMPLETE CBC W/AUTO DIFF WBC: CPT | Performed by: SURGERY

## 2019-01-04 PROCEDURE — 99024 POSTOP FOLLOW-UP VISIT: CPT | Performed by: SURGERY

## 2019-01-04 PROCEDURE — 80048 BASIC METABOLIC PNL TOTAL CA: CPT | Performed by: SURGERY

## 2019-01-04 RX ORDER — OXYCODONE HYDROCHLORIDE AND ACETAMINOPHEN 5; 325 MG/1; MG/1
2 TABLET ORAL EVERY 4 HOURS PRN
Qty: 30 TABLET | Refills: 0 | Status: SHIPPED | OUTPATIENT
Start: 2019-01-04 | End: 2019-01-14

## 2019-01-04 RX ADMIN — POTASSIUM CHLORIDE, DEXTROSE MONOHYDRATE AND SODIUM CHLORIDE 100 ML/HR: 150; 5; 450 INJECTION, SOLUTION INTRAVENOUS at 15:22

## 2019-01-04 RX ADMIN — KETOROLAC TROMETHAMINE 30 MG: 30 INJECTION, SOLUTION INTRAMUSCULAR at 14:18

## 2019-01-04 RX ADMIN — OXYCODONE HYDROCHLORIDE AND ACETAMINOPHEN 1 TABLET: 10; 325 TABLET ORAL at 01:41

## 2019-01-04 RX ADMIN — KETOROLAC TROMETHAMINE 30 MG: 30 INJECTION, SOLUTION INTRAMUSCULAR at 06:32

## 2019-01-04 RX ADMIN — OXYCODONE HYDROCHLORIDE AND ACETAMINOPHEN 1 TABLET: 10; 325 TABLET ORAL at 05:43

## 2019-01-04 RX ADMIN — POTASSIUM CHLORIDE, DEXTROSE MONOHYDRATE AND SODIUM CHLORIDE 100 ML/HR: 150; 5; 450 INJECTION, SOLUTION INTRAVENOUS at 04:13

## 2019-01-04 RX ADMIN — OXYCODONE HYDROCHLORIDE AND ACETAMINOPHEN 1 TABLET: 10; 325 TABLET ORAL at 10:07

## 2019-01-04 RX ADMIN — OXYCODONE HYDROCHLORIDE AND ACETAMINOPHEN 1 TABLET: 10; 325 TABLET ORAL at 18:31

## 2019-01-04 RX ADMIN — OXYCODONE HYDROCHLORIDE AND ACETAMINOPHEN 1 TABLET: 10; 325 TABLET ORAL at 14:18

## 2019-01-04 NOTE — PROGRESS NOTES
Chief Complaint: POD # 1    Subjective   Pt ambulating in the brambila, tolerating liquids, urinating without difficulties, pain well controlled     Objective     Vital signs in last 24 hours:  Temp:  [98.1 °F (36.7 °C)-99.5 °F (37.5 °C)] 99.5 °F (37.5 °C)  Heart Rate:  [62-85] 65  Resp:  [14-18] 18  BP: (116-160)/() 122/84    Intake/Output last 3 shifts:  I/O last 3 completed shifts:  In: 3468.3 [P.O.:550; I.V.:2918.3]  Out: 550 [Urine:500; Blood:50]    Intake/Output this shift:  I/O this shift:  In: 1000 [I.V.:1000]  Out: 400 [Urine:400]    Physical Exam:  Respiratory: CTA, good inspiratory effort  CV: RRR  Abd: + BS, soft, ND, usual post-op tenderness, no rebound, no guarding, incisions C/D/I    Assessment/Plan   S/P Lap Nissenorion Holt to shower  D/c home   F/u in 2 weeks       Qi Beltre MD  General, Minimally Invasive and Endoscopic Surgery  [facility]    [address] [facility] [address] [place] [address]    [address]  [place] [address]               [place] [address]    P: [phone]  F: [phone]    Cc:  Raeann Becerra MD

## 2019-01-04 NOTE — DISCHARGE SUMMARY
Admitting date: 1/3/2019    Discharging date: 1/4/2019    Admitting diagnosis: Recurrent hiatal hernia with reflux and esophagitis    Discharging diagnoses: Same    Admitting/discharging physician: Dr. Beltre    Procedures: Redo laparoscopic hiatal hernia repair with mesh and Nissen fundoplication using the da Jerome robot    Hospital course:  This is a pleasant 53-year-old gentleman who presented with a recurrent hiatal hernia with reflux and esophagitis.  Patient was admitted to the operating room on 1/3/2019 and underwent a redo laparoscopic hiatal hernia repair with mesh and Nissen fundoplication using the da Jerome robot.  Postoperatively the patient did well.  Patient is tolerating clear liquids, pain is well-controlled with oral pain medicine, patient is ambulating without assist and urinating without difficulties.  On postop day #1, patient was discharged to home in stable condition.  Patient was discharged on his admitting medications and Percocets.  Patient was instructed on his post op Nissen diet.  Patient was instructed on no driving while on pain medication, no lifting greater than 10 pounds, follow-up in my office in 2 weeks and may shower.        Qi Beltre MD  General, Minimally Invasive and Endoscopic Surgery  [facility]    [address] [facility] [address] [place] [address]    [address]  [address] [address]    P: [phone]  F: [phone]    Cc:  Raeann Becerra MD

## 2019-01-04 NOTE — PLAN OF CARE
Problem: Patient Care Overview  Goal: Plan of Care Review  Outcome: Ongoing (interventions implemented as appropriate)   01/04/19 3796   Plan of Care Review   Progress improving   OTHER   Outcome Summary alert, six abdominal lap sites c/d/i, voiding, ambulated in brambila, reports adequate pain control, vss, possilbe discharge today   Coping/Psychosocial   Plan of Care Reviewed With patient     Goal: Individualization and Mutuality  Outcome: Ongoing (interventions implemented as appropriate)    Goal: Discharge Needs Assessment  Outcome: Ongoing (interventions implemented as appropriate)    Goal: Interprofessional Rounds/Family Conf  Outcome: Ongoing (interventions implemented as appropriate)      Problem: Fall Risk (Adult)  Goal: Identify Related Risk Factors and Signs and Symptoms  Outcome: Outcome(s) achieved Date Met: 01/04/19    Goal: Absence of Fall  Outcome: Ongoing (interventions implemented as appropriate)      Problem: Pain, Acute (Adult)  Goal: Identify Related Risk Factors and Signs and Symptoms  Outcome: Ongoing (interventions implemented as appropriate)    Goal: Acceptable Pain Control/Comfort Level  Outcome: Ongoing (interventions implemented as appropriate)

## 2019-01-22 ENCOUNTER — OFFICE VISIT (OUTPATIENT)
Dept: SURGERY | Facility: CLINIC | Age: 54
End: 2019-01-22

## 2019-01-22 VITALS
BODY MASS INDEX: 25.3 KG/M2 | WEIGHT: 180.7 LBS | SYSTOLIC BLOOD PRESSURE: 130 MMHG | DIASTOLIC BLOOD PRESSURE: 80 MMHG | HEART RATE: 67 BPM | HEIGHT: 71 IN | OXYGEN SATURATION: 98 %

## 2019-01-22 DIAGNOSIS — Z09 FOLLOW UP: Primary | ICD-10-CM

## 2019-01-22 PROCEDURE — 99024 POSTOP FOLLOW-UP VISIT: CPT | Performed by: SURGERY

## 2019-01-22 NOTE — PROGRESS NOTES
"Chief complaint:  Post-op  Follow up    History of Present Illness    This is Oliverio Corrales 53 y.o. status post laparoscopic Nissen fundoplication and hiatal hernia repair with mesh using the da Jerome robot and is doing very well.  Patient is tolerating his diet Patient denies fever, chills, nausea or vomiting.  Patient's pain is well-controlled.      The following portions of the patient's history were reviewed and updated as appropriate: allergies, current medications, past family history, past medical history, past social history, past surgical history and problem list.    Vitals:    01/22/19 1041   BP: 130/80   Pulse: 67   SpO2: 98%   Weight: 82 kg (180 lb 11.2 oz)   Height: 180.3 cm (71\")       Physical Exam  Gen.: Patient is alert and oriented ×3, no acute distress  HEENT: Normal cephalic, atraumatic, moist mucous membranes, anicteric  Incision is well-healed without evidence of infection.    Assessment/plan:    This is Oliverio Corrales 53 y.o. status post laparoscopic Nissen fundoplication and hiatal hernia repair with mesh using the da Jerome robot and is doing very well.  I have advance the patient's diet to soft foods.  I have instructed the patient not lift greater than 10 pounds for total of 6 weeks from the time of surgery. I have instructed the patient follow-up in 2 weeks.    Qi Beltre MD  General, Minimally Invasive and Endoscopic Surgery  [facility]    [address] [facility] [address] [place] [address]    [address]  [address] [address]    P: [phone]  F: [phone]    Cc:  Raeann Becerra MD  "

## 2019-02-05 ENCOUNTER — OFFICE VISIT (OUTPATIENT)
Dept: SURGERY | Facility: CLINIC | Age: 54
End: 2019-02-05

## 2019-02-05 VITALS
HEART RATE: 74 BPM | DIASTOLIC BLOOD PRESSURE: 76 MMHG | WEIGHT: 179.8 LBS | OXYGEN SATURATION: 98 % | SYSTOLIC BLOOD PRESSURE: 116 MMHG | BODY MASS INDEX: 25.17 KG/M2 | HEIGHT: 71 IN

## 2019-02-05 DIAGNOSIS — Z09 FOLLOW UP: Primary | ICD-10-CM

## 2019-02-05 PROCEDURE — 99024 POSTOP FOLLOW-UP VISIT: CPT | Performed by: SURGERY

## 2019-02-05 NOTE — PROGRESS NOTES
"Chief complaint:  Post-op  Follow up    History of Present Illness    This is Oliverio Corrales 53 y.o. status post lap nissen and is doing very well.  Patient denies fever, chills, nausea or vomiting.  Patient's pain is well-controlled.      The following portions of the patient's history were reviewed and updated as appropriate: allergies, current medications, past family history, past medical history, past social history, past surgical history and problem list.    Vitals:    02/05/19 1145   BP: 116/76   Pulse: 74   SpO2: 98%   Weight: 81.6 kg (179 lb 12.8 oz)   Height: 180.3 cm (71\")       Physical Exam  Gen.: Patient is alert and oriented ×3, no acute distress  HEENT: Normal cephalic, atraumatic, moist mucous membranes, anicteric  Incision is well-healed without evidence of infection, herniation or seroma.    Assessment/plan:    This is Oliverio Corrales 53 y.o. status post lap nissen and is doing very well. Advance diet to soft meat. I have instructed the patient not lift greater than 10 pounds for total of 6 weeks from the time of surgery. I have instructed the patient follow-up as needed.    Qi Beltre MD  General, Minimally Invasive and Endoscopic Surgery  [facility]    [address] [facility] [address] [place] [address]    [address]  [place] [address]               [place] [address]    P: [phone]  F: [phone]    Cc:  Raeann Becerra MD  "

## 2019-02-19 ENCOUNTER — OFFICE VISIT (OUTPATIENT)
Dept: SURGERY | Facility: CLINIC | Age: 54
End: 2019-02-19

## 2019-02-19 VITALS
OXYGEN SATURATION: 98 % | HEIGHT: 71 IN | HEART RATE: 68 BPM | BODY MASS INDEX: 25.23 KG/M2 | WEIGHT: 180.2 LBS | SYSTOLIC BLOOD PRESSURE: 110 MMHG | DIASTOLIC BLOOD PRESSURE: 68 MMHG

## 2019-02-19 DIAGNOSIS — Z09 FOLLOW UP: Primary | ICD-10-CM

## 2019-02-19 PROCEDURE — 99024 POSTOP FOLLOW-UP VISIT: CPT | Performed by: SURGERY

## 2019-02-19 NOTE — PROGRESS NOTES
"Chief complaint:  Post-op  Follow up    History of Present Illness    This is Oliverio Corrales 53 y.o. status post 6 weeks laparoscopic nissen fundoplication and HH repair with mesh using the daVinci and is doing very well.  Patient denies any reflux.  Patient reports she is tolerating steak.  Patient denies fever, chills, nausea or vomiting.  Patient's pain is well-controlled.      The following portions of the patient's history were reviewed and updated as appropriate: allergies, current medications, past family history, past medical history, past social history, past surgical history and problem list.    Vitals:    02/19/19 1156   BP: 110/68   Pulse: 68   SpO2: 98%   Weight: 81.7 kg (180 lb 3.2 oz)   Height: 180.3 cm (71\")       Physical Exam  Gen.: Patient is alert and oriented ×3, no acute distress  HEENT: Normal cephalic, atraumatic, moist mucous membranes, anicteric  Incisions are well-healed without evidence of infection, herniation or seroma.    Assessment/plan:  Status post 6 weeks laparoscopic Nissen fundoplication with hiatal hernia repair with mesh using the da Jerome and tolerating a regular diet.  Patient will follow-up as needed.  Patient no longer has reflux.    Qi Beltre MD  General, Minimally Invasive and Endoscopic Surgery  [facility]    [address] [facility] [address] [place] [address]    [address]  [place], [place] [address] [place] [address]    P: [phone]  F: [phone]    Cc:  Raeann Becerra MD  "

## 2019-04-07 ENCOUNTER — HOSPITAL ENCOUNTER (EMERGENCY)
Facility: HOSPITAL | Age: 54
Discharge: HOME | End: 2019-04-07
Attending: EMERGENCY MEDICINE | Admitting: EMERGENCY MEDICINE

## 2019-04-07 ENCOUNTER — APPOINTMENT (OUTPATIENT)
Dept: CT IMAGING | Facility: HOSPITAL | Age: 54
End: 2019-04-07

## 2019-04-07 VITALS
TEMPERATURE: 98.4 F | HEART RATE: 55 BPM | OXYGEN SATURATION: 100 % | SYSTOLIC BLOOD PRESSURE: 154 MMHG | RESPIRATION RATE: 16 BRPM | DIASTOLIC BLOOD PRESSURE: 93 MMHG | BODY MASS INDEX: 24.93 KG/M2 | HEIGHT: 71 IN | WEIGHT: 178.1 LBS

## 2019-04-07 DIAGNOSIS — R07.89 ATYPICAL CHEST PAIN: Primary | ICD-10-CM

## 2019-04-07 LAB
ALBUMIN SERPL-MCNC: 4.2 G/DL (ref 3.5–5.2)
ALBUMIN/GLOB SERPL: 1.9 G/DL
ALP SERPL-CCNC: 106 U/L (ref 39–117)
ALT SERPL W P-5'-P-CCNC: 19 U/L (ref 1–41)
ANION GAP SERPL CALCULATED.3IONS-SCNC: 11.2 MMOL/L
AST SERPL-CCNC: 20 U/L (ref 1–40)
BASOPHILS # BLD AUTO: 0.07 10*3/MM3 (ref 0–0.2)
BASOPHILS NFR BLD AUTO: 1.2 % (ref 0–1.5)
BILIRUB SERPL-MCNC: 1.7 MG/DL (ref 0.2–1.2)
BUN BLD-MCNC: 16 MG/DL (ref 6–20)
BUN/CREAT SERPL: 15 (ref 7–25)
CALCIUM SPEC-SCNC: 8.8 MG/DL (ref 8.6–10.5)
CHLORIDE SERPL-SCNC: 102 MMOL/L (ref 98–107)
CO2 SERPL-SCNC: 23.8 MMOL/L (ref 22–29)
CREAT BLD-MCNC: 1.07 MG/DL (ref 0.76–1.27)
DEPRECATED RDW RBC AUTO: 42.9 FL (ref 37–54)
EOSINOPHIL # BLD AUTO: 0.14 10*3/MM3 (ref 0–0.4)
EOSINOPHIL NFR BLD AUTO: 2.3 % (ref 0.3–6.2)
ERYTHROCYTE [DISTWIDTH] IN BLOOD BY AUTOMATED COUNT: 13 % (ref 12.3–15.4)
GFR SERPL CREATININE-BSD FRML MDRD: 72 ML/MIN/1.73
GLOBULIN UR ELPH-MCNC: 2.2 GM/DL
GLUCOSE BLD-MCNC: 88 MG/DL (ref 65–99)
HCT VFR BLD AUTO: 45.7 % (ref 37.5–51)
HGB BLD-MCNC: 15.3 G/DL (ref 13–17.7)
IMM GRANULOCYTES # BLD AUTO: 0.02 10*3/MM3 (ref 0–0.05)
IMM GRANULOCYTES NFR BLD AUTO: 0.3 % (ref 0–0.5)
INR PPP: 1.17 (ref 0.9–1.1)
LYMPHOCYTES # BLD AUTO: 1.98 10*3/MM3 (ref 0.7–3.1)
LYMPHOCYTES NFR BLD AUTO: 32.9 % (ref 19.6–45.3)
MAGNESIUM SERPL-MCNC: 1.9 MG/DL (ref 1.6–2.6)
MCH RBC QN AUTO: 30.4 PG (ref 26.6–33)
MCHC RBC AUTO-ENTMCNC: 33.5 G/DL (ref 31.5–35.7)
MCV RBC AUTO: 90.9 FL (ref 79–97)
MONOCYTES # BLD AUTO: 0.56 10*3/MM3 (ref 0.1–0.9)
MONOCYTES NFR BLD AUTO: 9.3 % (ref 5–12)
NEUTROPHILS # BLD AUTO: 3.24 10*3/MM3 (ref 1.4–7)
NEUTROPHILS NFR BLD AUTO: 54 % (ref 42.7–76)
NRBC BLD AUTO-RTO: 0 /100 WBC (ref 0–0)
NT-PROBNP SERPL-MCNC: 47.8 PG/ML (ref 5–900)
PLATELET # BLD AUTO: 196 10*3/MM3 (ref 140–450)
PMV BLD AUTO: 10.5 FL (ref 6–12)
POTASSIUM BLD-SCNC: 3.7 MMOL/L (ref 3.5–5.2)
PROT SERPL-MCNC: 6.4 G/DL (ref 6–8.5)
PROTHROMBIN TIME: 14.6 SECONDS (ref 11.7–14.2)
RBC # BLD AUTO: 5.03 10*6/MM3 (ref 4.14–5.8)
SODIUM BLD-SCNC: 137 MMOL/L (ref 136–145)
TROPONIN T SERPL-MCNC: <0.01 NG/ML (ref 0–0.03)
WBC NRBC COR # BLD: 6.01 10*3/MM3 (ref 3.4–10.8)

## 2019-04-07 PROCEDURE — 80053 COMPREHEN METABOLIC PANEL: CPT | Performed by: EMERGENCY MEDICINE

## 2019-04-07 PROCEDURE — 93005 ELECTROCARDIOGRAM TRACING: CPT | Performed by: EMERGENCY MEDICINE

## 2019-04-07 PROCEDURE — 71275 CT ANGIOGRAPHY CHEST: CPT

## 2019-04-07 PROCEDURE — 84484 ASSAY OF TROPONIN QUANT: CPT | Performed by: EMERGENCY MEDICINE

## 2019-04-07 PROCEDURE — 99284 EMERGENCY DEPT VISIT MOD MDM: CPT

## 2019-04-07 PROCEDURE — 93010 ELECTROCARDIOGRAM REPORT: CPT | Performed by: INTERNAL MEDICINE

## 2019-04-07 PROCEDURE — 0 IOPAMIDOL PER 1 ML: Performed by: EMERGENCY MEDICINE

## 2019-04-07 PROCEDURE — 83735 ASSAY OF MAGNESIUM: CPT | Performed by: EMERGENCY MEDICINE

## 2019-04-07 PROCEDURE — 83880 ASSAY OF NATRIURETIC PEPTIDE: CPT | Performed by: EMERGENCY MEDICINE

## 2019-04-07 PROCEDURE — 85610 PROTHROMBIN TIME: CPT | Performed by: EMERGENCY MEDICINE

## 2019-04-07 PROCEDURE — 85025 COMPLETE CBC W/AUTO DIFF WBC: CPT | Performed by: EMERGENCY MEDICINE

## 2019-04-07 RX ORDER — SODIUM CHLORIDE 0.9 % (FLUSH) 0.9 %
10 SYRINGE (ML) INJECTION AS NEEDED
Status: DISCONTINUED | OUTPATIENT
Start: 2019-04-07 | End: 2019-04-07 | Stop reason: HOSPADM

## 2019-04-07 RX ADMIN — IOPAMIDOL 95 ML: 755 INJECTION, SOLUTION INTRAVENOUS at 14:13

## 2019-04-07 NOTE — ED NOTES
"Pt c/o SOA and \"pain in the lungs\" that started 2 months ago after hiatal hernia surgery. Pt has hx of PE after surgery in 2017. Pt also c/o numbness in hands and feet. Pt is alert and oriented x 3. Jacqui Corea, RN  04/07/19 0462    "

## 2019-04-07 NOTE — ED PROVIDER NOTES
" EMERGENCY DEPARTMENT ENCOUNTER    CHIEF COMPLAINT  Chief Complaint: \"My lungs hurt\"  History given by: Patient  History limited by: None  Room Number: 10/10  PMD: Raeann Becerra MD      HPI:  Pt is a 53 y.o. male who presents stating \"my lungs hurt\". He states this pain has been constant and began a month and a half ago. It is exacerbated when using his CPAP. Pt also c/o generalized weakness, SOA, and intermittent numbness in his fingers that began a few months ago, but denies nausea and vomiting. He states his current sxs feel similar to when he had a blood clot in 2017. He was anticoagulated and treated with Xarelto, but he is no longer anticoagulated and has not been for approx. 2 years.    Duration: Began a month and a half ago  Onset: Gradual  Timing: Constant  Intensity/Severity: Moderate  Progression: Unchanged  Associated Symptoms: generalized weakness, SOA, and intermittent numbness in his fingers that began a few months ago  Aggravating Factors: Exacerbated when using his CPAP  Previous Episodes: He states his current sxs feel similar to when he had a blood clot in 2017.  Treatment before arrival: None    PAST MEDICAL HISTORY  Active Ambulatory Problems     Diagnosis Date Noted   • Bilateral pulmonary embolism (CMS/HCC) 01/02/2017   • HH (hiatus hernia) 10/30/2018   • Hiatal hernia with gastroesophageal reflux disease and esophagitis 01/03/2019     Resolved Ambulatory Problems     Diagnosis Date Noted   • No Resolved Ambulatory Problems     Past Medical History:   Diagnosis Date   • GERD (gastroesophageal reflux disease)    • Pulmonary embolism (CMS/HCC)    • Reflux esophagitis        PAST SURGICAL HISTORY  Past Surgical History:   Procedure Laterality Date   • CHOLECYSTECTOMY     • COLONOSCOPY     • ENDOSCOPY     • INGUINAL HERNIA REPAIR     • NISSEN FUNDOPLICATION N/A 1/3/2019    Procedure: REDO LAPAROSCOPIC NISSEN FUNDOPLICATION  WITH DAVINCI ROBOT AND MESH;  Surgeon: Qi Beltre MD;  " "Location: [facility] OR;  Service: [facility]   • SHOULDER SURGERY Left    • TONSILLECTOMY         FAMILY HISTORY  Family History   Problem Relation Age of Onset   • Lung disease Father    • Malig Hyperthermia Neg Hx        SOCIAL HISTORY  Social History     Socioeconomic History   • Marital status: Single     Spouse name: Not on file   • Number of children: Not on file   • Years of education: Not on file   • Highest education level: Not on file   Occupational History   • Occupation: restoration   Tobacco Use   • Smoking status: Never Smoker   • Smokeless tobacco: Never Used   Substance and Sexual Activity   • Alcohol use: No   • Drug use: No   • Sexual activity: Defer     Birth control/protection: None       ALLERGIES  Hydrocortisone    REVIEW OF SYSTEMS  Review of Systems   Constitutional: Negative for activity change, appetite change and fever.   HENT: Negative for congestion and sore throat.    Eyes: Negative.    Respiratory: Positive for shortness of breath. Negative for cough.         He states \"my lungs hurt\" and that the pain is exacerbated when using CPAP.   Cardiovascular: Negative for chest pain and leg swelling.   Gastrointestinal: Negative for abdominal pain, diarrhea, nausea and vomiting.   Endocrine: Negative.    Genitourinary: Negative for decreased urine volume and dysuria.   Musculoskeletal: Negative for neck pain.   Skin: Negative for rash and wound.   Allergic/Immunologic: Negative.    Neurological: Positive for weakness (generalized) and numbness (intermittent to his fingers). Negative for headaches.   Hematological: Negative.    Psychiatric/Behavioral: Negative.    All other systems reviewed and are negative.      PHYSICAL EXAM  ED Triage Vitals   Temp Heart Rate Resp BP SpO2   04/07/19 1235 04/07/19 1232 04/07/19 1232 04/07/19 1232 04/07/19 1232   98.4 °F (36.9 °C) 58 16 141/99 100 %      Temp src Heart Rate Source Patient Position BP Location FiO2 (%)   04/07/19 1235 04/07/19 1232 04/07/19 1232 " 04/07/19 1232 --   Oral Monitor Lying Left arm        Physical Exam   Constitutional: He is oriented to person, place, and time. No distress.   HENT:   Head: Normocephalic and atraumatic.   Eyes: EOM are normal. Pupils are equal, round, and reactive to light.   Neck: Normal range of motion. Neck supple.   Cardiovascular: Normal rate, regular rhythm and normal heart sounds.   /99.   Pulmonary/Chest: Effort normal and breath sounds normal. No respiratory distress.   O2 is 100% on RA.   Abdominal: Soft. There is no tenderness. There is no rebound and no guarding.   Musculoskeletal: Normal range of motion. He exhibits no edema.   Neurological: He is alert and oriented to person, place, and time. He has normal sensation and normal strength.   Skin: Skin is warm and dry.   Psychiatric: Mood and affect normal.   Nursing note and vitals reviewed.      LAB RESULTS  Lab Results (last 24 hours)     Procedure Component Value Units Date/Time    CBC & Differential [[id number]] Collected:  04/07/19 1253    Specimen:  Blood Updated:  04/07/19 3695    Narrative:       The following orders were created for panel order CBC & Differential.  Procedure                               Abnormality         Status                     ---------                               -----------         ------                     CBC Auto Differential[[id number]]        Normal              Final result                 Please view results for these tests on the individual orders.    Comprehensive Metabolic Panel [[id number]]  (Abnormal) Collected:  04/07/19 1253    Specimen:  Blood Updated:  04/07/19 1328     Glucose 88 mg/dL      BUN 16 mg/dL      Creatinine 1.07 mg/dL      Sodium 137 mmol/L      Potassium 3.7 mmol/L      Chloride 102 mmol/L      CO2 23.8 mmol/L      Calcium 8.8 mg/dL      Total Protein 6.4 g/dL      Albumin 4.20 g/dL      ALT (SGPT) 19 U/L      AST (SGOT) 20 U/L      Alkaline Phosphatase 106 U/L      Total Bilirubin 1.7 mg/dL       eGFR Non African Amer 72 mL/min/1.73      Globulin 2.2 gm/dL      A/G Ratio 1.9 g/dL      BUN/Creatinine Ratio 15.0     Anion Gap 11.2 mmol/L     Narrative:       GFR Normal >60  Chronic Kidney Disease <60  Kidney Failure <15    Protime-INR [[id number]]  (Abnormal) Collected:  04/07/19 1253    Specimen:  Blood Updated:  04/07/19 1349     Protime 14.6 Seconds      INR 1.17    BNP [[id number]]  (Normal) Collected:  04/07/19 1253    Specimen:  Blood Updated:  04/07/19 1325     proBNP 47.8 pg/mL     Narrative:       Among patients with dyspnea, NT-proBNP is highly sensitive for the detection of acute congestive heart failure. In addition NT-proBNP of <300 pg/ml effectively rules out acute congestive heart failure with 99% negative predictive value.    Troponin [[id number]]  (Normal) Collected:  04/07/19 1253    Specimen:  Blood Updated:  04/07/19 1328     Troponin T <0.010 ng/mL     Narrative:       Troponin T Reference Range:  <= 0.03 ng/mL-   Negative for AMI  >0.03 ng/mL-     Abnormal for myocardial necrosis.  Clinicians would have to utilize clinical acumen, EKG, Troponin and serial changes to determine if it is an Acute Myocardial Infarction or myocardial injury due to an underlying chronic condition.     Magnesium [[id number]]  (Normal) Collected:  04/07/19 1253    Specimen:  Blood Updated:  04/07/19 1328     Magnesium 1.9 mg/dL     CBC Auto Differential [[id number]]  (Normal) Collected:  04/07/19 1253    Specimen:  Blood Updated:  04/07/19 1335     WBC 6.01 10*3/mm3      RBC 5.03 10*6/mm3      Hemoglobin 15.3 g/dL      Hematocrit 45.7 %      MCV 90.9 fL      MCH 30.4 pg      MCHC 33.5 g/dL      RDW 13.0 %      RDW-SD 42.9 fl      MPV 10.5 fL      Platelets 196 10*3/mm3      Neutrophil % 54.0 %      Lymphocyte % 32.9 %      Monocyte % 9.3 %      Eosinophil % 2.3 %      Basophil % 1.2 %      Immature Grans % 0.3 %      Neutrophils, Absolute 3.24 10*3/mm3      Lymphocytes, Absolute 1.98 10*3/mm3      Monocytes, Absolute  0.56 10*3/mm3      Eosinophils, Absolute 0.14 10*3/mm3      Basophils, Absolute 0.07 10*3/mm3      Immature Grans, Absolute 0.02 10*3/mm3      nRBC 0.0 /100 WBC           I ordered the above labs and reviewed the results    RADIOLOGY  CT Angiogram Chest With Contrast   Preliminary Result   1. No evidence of pulmonary embolus.   2. No acute process identified.                       I ordered the above noted radiological studies. Interpreted by radiologist. Discussed with radiologist (Dr. Roth). Reviewed by me in PACS.       PROCEDURES  Procedures  EKG          EKG time: 1305  Rhythm/Rate: NSR rate 54  Narrow complex  Subtle nonspecific changes, but no acute provess  Nml QT    Interpreted Contemporaneously by me, independently viewed  Similar compared to prior 4/20/17.    PROGRESS AND CONSULTS  ED Course as of Apr 07 1451   Sun Apr 07, 2019   1435 INR: (!) 1.17 [AM]   1448 2:48 PM  Patient symptoms are very atypical for any serious or emergent etiology.  I did complete that it the CTA of the chest per the patient's request.  The patient is not tachycardic with an O2 sat of 100% on room air with no respiratory distress on exam.  The patient's symptoms have been occurring for over 1-1/2 months and have been constant.  I talked with the patient at length I will discharge him home have him follow-up with his primary care doctor.  I think this is very unlikely cardiac in etiology.  The patient had a stress test and a cardiac workup about 2 years ago that was normal.  [MM]      ED Course User Index  [AM] Minoo Morales  [MM] Nathanael Nolasco MD     1239 Ordered EKG for further evaluation.    1248 Ordered CTA chest for further evaluation.    1252 Ordered CBC, magnesium level, troponin, BNP, INR, and CMP for further evaluation.    1443 Rechecked with pt, who is resting comfortably, and informed him of the results of his labs, EKG, and CTA chest. Upon re-exam he is 100% O2 on RA and he is in NAD. Plan to discharge. He  should f/u with his PCP and cardiologist. Pt understands and agrees with the plan, all questions answered.    MEDICAL DECISION MAKING  Results were reviewed/discussed with the patient and they were also made aware of online access. Pt also made aware that some labs, such as cultures, will not be resulted during ER visit and follow up with PMD is necessary.     MDM  Number of Diagnoses or Management Options     Amount and/or Complexity of Data Reviewed  Clinical lab tests: ordered and reviewed (INR= 1.17, WBC= 6.01, troponin= <0.010)  Tests in the radiology section of CPT®: ordered and reviewed (CTA chest shows no acute process.)  Tests in the medicine section of CPT®: ordered and reviewed (See procedure notes for EKG.)  Decide to obtain previous medical records or to obtain history from someone other than the patient: yes  Review and summarize past medical records: yes (On 1/31/19 pt had a redo laparoscopic hiatal hernia repair with mesh and Nissen fundoplication using the da Jerome robot by Dr. Beltre.)    Patient Progress  Patient progress: stable         DIAGNOSIS  Final diagnoses:   Atypical chest pain       DISPOSITION  DISCHARGE    Patient discharged in stable condition.    Reviewed implications of results, diagnosis, meds, responsibility to follow up, warning signs and symptoms of possible worsening, potential complications and reasons to return to ER, including new or worsening sxs.    Patient/Family voiced understanding of above instructions.    Discussed plan for discharge, as there is no emergent indication for admission. Patient referred to primary care provider for BP management due to today's BP. Pt/family is agreeable and understands need for follow up and repeat testing.  Pt is aware that discharge does not mean that nothing is wrong but it indicates no emergency is present that requires admission and they must continue care with follow-up as given below or physician of their choice.      FOLLOW-UP  Raeann Becerra MD  [address]  [facility] [id number] [phone]    In 2 days  Return if pain worsens, If symptoms worsen, shortness of breath, fever, any concerns    Latest Documented Vital Signs:  As of 2:51 PM  BP- 141/95 HR- (!) 47 Temp- 98.4 °F (36.9 °C) (Oral) O2 sat- 100%    --  Documentation assistance provided by roxana Morales for Dr. Nolasco.  Information recorded by the scribe was done at my direction and has been verified and validated by me.     Minoo Morales  04/07/19 8657       Nathanael Nolasco MD  04/07/19 6574

## 2019-04-07 NOTE — ED TRIAGE NOTES
"Pt. feels weak and reports he has no feeling in his \"arms and his penis\" and feels like he did last time he had blood clots in his lungs.  "

## 2019-04-07 NOTE — DISCHARGE INSTRUCTIONS
Give a call to your cardiologist in follow-up in the next couple of days.  Return if fever, worsening of pain, or any concerns.

## 2019-04-15 ENCOUNTER — TELEPHONE (OUTPATIENT)
Dept: SURGERY | Facility: CLINIC | Age: 54
End: 2019-04-15

## 2019-04-15 NOTE — TELEPHONE ENCOUNTER
Patient called and states that since surgery he is having some issues and states he has been trying B12 and that helps some but states that his stomach has not been the same since surgery and states that he has HC      Cell : [phone]

## 2019-06-13 ENCOUNTER — DOCUMENTATION (OUTPATIENT)
Dept: SURGERY | Facility: CLINIC | Age: 54
End: 2019-06-13

## 2019-06-13 NOTE — PROGRESS NOTES
06/06/19    Pt called regarding some problems since his Nissen in Jan. of this year. He is fatigued and has been taking Vit B12 that Dr. Beltre suggested he take ,but only half of the dose. I explained he needed to take the whole dose and he could also take an Iron supplement over the counter. He then said he had lost some hair and that really worried him. I instructed him to take Vit. for hair and nails over the counter. He appreciated the advise and was going to do as instructed.  ORLIN Kim RN

## 2019-06-18 ENCOUNTER — OFFICE (OUTPATIENT)
Dept: URBAN - METROPOLITAN AREA CLINIC 75 | Facility: CLINIC | Age: 54
End: 2019-06-18
Payer: MEDICAID

## 2019-06-18 VITALS
SYSTOLIC BLOOD PRESSURE: 142 MMHG | HEIGHT: 71 IN | WEIGHT: 176 LBS | HEART RATE: 74 BPM | DIASTOLIC BLOOD PRESSURE: 82 MMHG

## 2019-06-18 DIAGNOSIS — K44.9 DIAPHRAGMATIC HERNIA WITHOUT OBSTRUCTION OR GANGRENE: ICD-10-CM

## 2019-06-18 DIAGNOSIS — K21.0 GASTRO-ESOPHAGEAL REFLUX DISEASE WITH ESOPHAGITIS: ICD-10-CM

## 2019-06-18 DIAGNOSIS — K22.70 BARRETT'S ESOPHAGUS WITHOUT DYSPLASIA: ICD-10-CM

## 2019-06-18 DIAGNOSIS — R12 HEARTBURN: ICD-10-CM

## 2019-06-18 DIAGNOSIS — Z12.11 ENCOUNTER FOR SCREENING FOR MALIGNANT NEOPLASM OF COLON: ICD-10-CM

## 2019-06-18 DIAGNOSIS — K52.9 NONINFECTIVE GASTROENTERITIS AND COLITIS, UNSPECIFIED: ICD-10-CM

## 2019-06-18 PROCEDURE — 99214 OFFICE O/P EST MOD 30 MIN: CPT | Performed by: NURSE PRACTITIONER

## 2020-09-01 ENCOUNTER — OFFICE VISIT (OUTPATIENT)
Dept: SURGERY | Facility: CLINIC | Age: 55
End: 2020-09-01

## 2020-09-01 VITALS
HEIGHT: 71 IN | HEART RATE: 71 BPM | WEIGHT: 179 LBS | DIASTOLIC BLOOD PRESSURE: 70 MMHG | RESPIRATION RATE: 18 BRPM | SYSTOLIC BLOOD PRESSURE: 136 MMHG | OXYGEN SATURATION: 98 % | BODY MASS INDEX: 25.06 KG/M2

## 2020-09-01 DIAGNOSIS — R10.32 LEFT INGUINAL PAIN: Primary | ICD-10-CM

## 2020-09-01 PROCEDURE — 99213 OFFICE O/P EST LOW 20 MIN: CPT | Performed by: SURGERY

## 2020-09-01 RX ORDER — DOXYCYCLINE HYCLATE 50 MG/1
324 CAPSULE, GELATIN COATED ORAL
COMMUNITY

## 2020-09-01 RX ORDER — UBIDECARENONE 75 MG
50 CAPSULE ORAL DAILY
COMMUNITY
End: 2022-06-03 | Stop reason: HOSPADM

## 2020-09-01 NOTE — PROGRESS NOTES
"Chief complaint: Left inguinal pain    Patient is a 55 y.o. male referred by Raeann Becerra MD for evaluation of left inguinal pain.  Patient reports that he had a left open inguinal hernia repair in 1996.  And in 2012 when he was exercising felt a pop and since that time he has been experiencing left inguinal pain with activities.  It is better when he rests.  He has tried pain management which has not helped.  Patient is desiring to have the mesh removed.  Patient denies fever, chills, nausea or vomiting.  Patient denies any bulging in this area.    Past Medical History:   Diagnosis Date   • GERD (gastroesophageal reflux disease)    • Pulmonary embolism (CMS/HCC)    • Reflux esophagitis      Past Surgical History:   Procedure Laterality Date   • CHOLECYSTECTOMY     • COLONOSCOPY     • ENDOSCOPY     • INGUINAL HERNIA REPAIR     • NISSEN FUNDOPLICATION N/A 1/3/2019    Procedure: REDO LAPAROSCOPIC NISSEN FUNDOPLICATION  WITH DAVINCI ROBOT AND MESH;  Surgeon: Qi Beltre MD;  Location: [facility];  Service: [facility]   • SHOULDER SURGERY Left    • TONSILLECTOMY       Family History   Problem Relation Age of Onset   • Lung disease Father    • Malig Hyperthermia Neg Hx      Social History     Tobacco Use   • Smoking status: Never Smoker   • Smokeless tobacco: Never Used   Substance Use Topics   • Alcohol use: No   • Drug use: No     Allergies   Allergen Reactions   • Hydrocortisone Confusion     \"injecting cortisone\"       Current Outpatient Medications:   •  ferrous gluconate (FERGON) 324 MG tablet, Take 324 mg by mouth Daily With Breakfast., Disp: , Rfl:   •  vitamin B-12 (CYANOCOBALAMIN) 100 MCG tablet, Take 50 mcg by mouth Daily., Disp: , Rfl:     Review of Systems  General: Patient reports during good health  Eyes: No eye problems  Ears, nose, mouth and throat: No rhinitis, no hearing problems, no chronic cough  Cardiovascular/heart: Denies palpitations, syncope or chest pain  Respiratory/lung: " "Denies shortness of breath, hemoptysis, dyspnea on exertion   Genital/urinary: No frequency, hematuria or dysuria  Hematological/lymphatic: Denies anemia or other problems  Musculoskeletal: No joint pain, no defects  Skin: No psoriasis or other skin issues  Neurological: No seizures or other neurological problems  Psychiatric: None  Endocrine: Negative  Gastro-intestinal: No constipation, no diarrhea, no melena, no hematochezia  Vitals:    09/01/20 1445   BP: 136/70   Pulse: 71   Resp: 18   SpO2: 98%   Weight: 81.2 kg (179 lb)   Height: 180.3 cm (71\")       Physical Exam  General/physcological:   Alert and oriented x3, in no acute distress  HEENT: Normal cephalic, atraumatic, PERRLA, EOMI, sclera anicteric, moist mucous membranes, neck is supple, no JVD, no carotid bruits, no thyromegaly no adenopathy  Respiratory: CTA and percussion  CVA: RRR, normal S1-S2, no murmurs, no gallops or rubs  GI: Positive BS, soft, nondistended, nontender, no rebound, no guarding, no hernias, no organomegaly and no masses  Musculoskeletal: Moves all 4 ext, no clubbing, no cyanosis or edema  Neurovascular: Grossly intact  Debilities: none  Emotional behavior: appropriate     Patient does not use tobacco products currently.     Assessment:  Left inguinal pain  Plan:  I have advised the patient that we could remove the mesh.  However, I do not believe that that will eliminate the pain in his left groin.  Most likely the pain is due to a nerve injury or neuroma.  However, the patient is insistent because of everything he has been seeing on TV about mesh is bad.  I have discussed the procedure of removing the mesh in detail with the patient.  I have also advised the patient it could cause more pain.  Patient understands this and wishes to proceed.    Qi Beltre MD  General, Minimally Invasive and Endoscopic Surgery  [facility]      [address]    [address]  [place], [place] " [address]               [place] [address]    P: [phone]  F: [phone]    Cc:  Raeann Becerra MD

## 2020-12-29 ENCOUNTER — LAB REQUISITION (OUTPATIENT)
Dept: LAB | Facility: HOSPITAL | Age: 55
End: 2020-12-29

## 2020-12-29 DIAGNOSIS — Z00.00 ENCOUNTER FOR GENERAL ADULT MEDICAL EXAMINATION WITHOUT ABNORMAL FINDINGS: ICD-10-CM

## 2021-01-01 PROCEDURE — U0004 COV-19 TEST NON-CDC HGH THRU: HCPCS | Performed by: SURGERY

## 2021-01-02 LAB — SARS-COV-2 RNA RESP QL NAA+PROBE: NOT DETECTED

## 2021-01-04 ENCOUNTER — OUTSIDE FACILITY SERVICE (OUTPATIENT)
Dept: SURGERY | Facility: CLINIC | Age: 56
End: 2021-01-04

## 2021-01-04 PROCEDURE — 49520 REREPAIR ING HERNIA REDUCE: CPT | Performed by: SURGERY

## 2021-01-07 ENCOUNTER — TRANSCRIBE ORDERS (OUTPATIENT)
Dept: PREADMISSION TESTING | Facility: HOSPITAL | Age: 56
End: 2021-01-07

## 2021-01-07 DIAGNOSIS — Z01.818 OTHER SPECIFIED PRE-OPERATIVE EXAMINATION: Primary | ICD-10-CM

## 2021-01-12 ENCOUNTER — APPOINTMENT (OUTPATIENT)
Dept: PREADMISSION TESTING | Facility: HOSPITAL | Age: 56
End: 2021-01-12

## 2021-01-12 VITALS
SYSTOLIC BLOOD PRESSURE: 130 MMHG | WEIGHT: 181 LBS | RESPIRATION RATE: 20 BRPM | BODY MASS INDEX: 25.34 KG/M2 | OXYGEN SATURATION: 97 % | DIASTOLIC BLOOD PRESSURE: 85 MMHG | HEART RATE: 78 BPM | TEMPERATURE: 98.8 F | HEIGHT: 71 IN

## 2021-01-12 LAB
ANION GAP SERPL CALCULATED.3IONS-SCNC: 12.3 MMOL/L (ref 5–15)
BUN SERPL-MCNC: 16 MG/DL (ref 6–20)
BUN/CREAT SERPL: 17 (ref 7–25)
CALCIUM SPEC-SCNC: 9.1 MG/DL (ref 8.6–10.5)
CHLORIDE SERPL-SCNC: 102 MMOL/L (ref 98–107)
CO2 SERPL-SCNC: 28.7 MMOL/L (ref 22–29)
CREAT SERPL-MCNC: 0.94 MG/DL (ref 0.76–1.27)
DEPRECATED RDW RBC AUTO: 40.1 FL (ref 37–54)
ERYTHROCYTE [DISTWIDTH] IN BLOOD BY AUTOMATED COUNT: 12.5 % (ref 12.3–15.4)
GFR SERPL CREATININE-BSD FRML MDRD: 83 ML/MIN/1.73
GLUCOSE SERPL-MCNC: 107 MG/DL (ref 65–99)
HCT VFR BLD AUTO: 47.7 % (ref 37.5–51)
HGB BLD-MCNC: 16.6 G/DL (ref 13–17.7)
MCH RBC QN AUTO: 30.7 PG (ref 26.6–33)
MCHC RBC AUTO-ENTMCNC: 34.8 G/DL (ref 31.5–35.7)
MCV RBC AUTO: 88.2 FL (ref 79–97)
PLATELET # BLD AUTO: 255 10*3/MM3 (ref 140–450)
PMV BLD AUTO: 9.9 FL (ref 6–12)
POTASSIUM SERPL-SCNC: 4.4 MMOL/L (ref 3.5–5.2)
RBC # BLD AUTO: 5.41 10*6/MM3 (ref 4.14–5.8)
SODIUM SERPL-SCNC: 143 MMOL/L (ref 136–145)
WBC # BLD AUTO: 7.79 10*3/MM3 (ref 3.4–10.8)

## 2021-01-12 PROCEDURE — 85027 COMPLETE CBC AUTOMATED: CPT

## 2021-01-12 PROCEDURE — 36415 COLL VENOUS BLD VENIPUNCTURE: CPT

## 2021-01-12 PROCEDURE — 80048 BASIC METABOLIC PNL TOTAL CA: CPT

## 2021-01-12 NOTE — DISCHARGE INSTRUCTIONS
Take the following medications the morning of surgery:            ARRIVE TO MAIN SURGERY AT 5:30 AM ON 1/29/2021        If you are on prescription narcotic pain medication to control your pain you may also take that medication the morning of surgery.    General Instructions:  • Do not eat solid food after midnight the night before surgery.  • You may drink clear liquids day of surgery but must stop at least one hour before your hospital arrival time.  • It is beneficial for you to have a clear drink that contains carbohydrates the day of surgery.  We suggest a 12 to 20 ounce bottle of Gatorade or Powerade for non-diabetic patients or a 12 to 20 ounce bottle of G2 or Powerade Zero for diabetic patients. (Pediatric patients, are not advised to drink a 12 to 20 ounce carbohydrate drink)    Clear liquids are liquids you can see through.  Nothing red in color.     Plain water                               Sports drinks  Sodas                                   Gelatin (Jell-O)  Fruit juices without pulp such as white grape juice and apple juice  Popsicles that contain no fruit or yogurt  Tea or coffee (no cream or milk added)  Gatorade / Powerade  G2 / Powerade Zero    • Infants may have breast milk up to four hours before surgery.  • Infants drinking formula may drink formula up to six hours before surgery.   • Patients who avoid smoking, chewing tobacco and alcohol for 4 weeks prior to surgery have a reduced risk of post-operative complications.  Quit smoking as many days before surgery as you can.  • Do not smoke, use chewing tobacco or drink alcohol the day of surgery.   • If applicable bring your C-PAP/ BI-PAP machine.  • Bring any papers given to you in the doctor’s office.  • Wear clean comfortable clothes.  • Do not wear contact lenses, false eyelashes or make-up.  Bring a case for your glasses.   • Bring crutches or walker if applicable.  • Remove all piercings.  Leave jewelry and any other valuables at  home.  • Hair extensions with metal clips must be removed prior to surgery.  • The Pre-Admission Testing nurse will instruct you to bring medications if unable to obtain an accurate list in Pre-Admission Testing.        Preventing a Surgical Site Infection:  • For 2 to 3 days before surgery, avoid shaving with a razor because the razor can irritate skin and make it easier to develop an infection.    • Any areas of open skin can increase the risk of a post-operative wound infection by allowing bacteria to enter and travel throughout the body.  Notify your surgeon if you have any skin wounds / rashes even if it is not near the expected surgical site.  The area will need assessed to determine if surgery should be delayed until it is healed.  • The night prior to surgery shower using a fresh bar of anti-bacterial soap (such as Dial) and clean washcloth.  Sleep in a clean bed with clean clothing.  Do not allow pets to sleep with you.  • Shower on the morning of surgery using a fresh bar of anti-bacterial soap (such as Dial) and clean washcloth.  Dry with a clean towel and dress in clean clothing.  • Ask your surgeon if you will be receiving antibiotics prior to surgery.  • Make sure you, your family, and all healthcare providers clean their hands with soap and water or an alcohol based hand  before caring for you or your wound.    Day of surgery:  Your arrival time is approximately two hours before your scheduled surgery time.  Upon arrival, a Pre-op nurse and Anesthesiologist will review your health history, obtain vital signs, and answer questions you may have.  The only belongings needed at this time will be a list of your home medications and if applicable your C-PAP/BI-PAP machine.  A Pre-op nurse will start an IV and you may receive medication in preparation for surgery, including something to help you relax.     Please be aware that surgery does come with discomfort.  We want to make every effort to  control your discomfort so please discuss any uncontrolled symptoms with your nurse.   Your doctor will most likely have prescribed pain medications.      If you are going home after surgery you will receive individualized written care instructions before being discharged.  A responsible adult must drive you to and from the hospital on the day of your surgery and stay with you for 24 hours.  Discharge prescriptions can be filled by the hospital pharmacy during regular pharmacy hours.  If you are having surgery late in the day/evening your prescription may be e-prescribed to your pharmacy.  Please verify your pharmacy hours or chose a 24 hour pharmacy to avoid not having access to your prescription because your pharmacy has closed for the day.    If you are staying overnight following surgery, you will be transported to your hospital room following the recovery period.  [facility] has all private rooms.    If you have any questions please call Pre-Admission Testing at [phone].  Deductibles and co-payments are collected on the day of service. Please be prepared to pay the required co-pay, deductible or deposit on the day of service as defined by your plan.    Patient Education for Self-Quarantine Process    Following your COVID testing, we strongly recommend that you do not leave your home after you have been tested for COVID except to get medical care. This includes not going to work, school or to public areas.  If this is not possible for you to do please limit your activities to only required outings.  Be sure to wear a mask when you are with other people, practice social distancing and wash your hands frequently.      The following items provide additional details to keep you safe.  • Wash your hands with soap and water frequently for at least 20 seconds.   • Avoid touching your eyes, nose and mouth with unwashed hands.  • Do not share anything - utensils, towels, food from the same bowl.    • Have your own utensils, drinking glass, dishes, towels and bedding.   • Do not have visitors.   • Do use FaceTime to stay in touch with family and friends.  • You should stay in a specific room away from others if possible.   • Stay at least 6 feet away from others in the home if you cannot have a dedicated room to yourself.   • Do not snuggle with your pet. While the CDC says there is no evidence that pets can spread COVID-19 or be infected from humans, it is probably best to avoid “petting, snuggling, being kissed or licked and sharing food (during self-quarantine)”, according to the CDC.   • Sanitize household surfaces daily. Include all high touch areas (door handles, light switches, phones, countertops, etc.)  • Do not share a bathroom with others, if possible.   • Wear a mask around others in your home if you are unable to stay in a separate room or 6 feet apart. If  you are unable to wear a mask, have your family member wear a mask if they must be within 6 feet of you.   Call your surgeon immediately if you experience any of the following symptoms:  • Sore Throat  • Shortness of Breath or difficulty breathing  • Cough  • Chills  • Body soreness or muscle pain  • Headache  • Fever  • New loss of taste or smell  • Do not arrive for your surgery ill.  Your procedure will need to be rescheduled to another time.  You will need to call your physician before the day of surgery to avoid any unnecessary exposure to hospital staff as well as other patients.

## 2021-01-27 ENCOUNTER — LAB (OUTPATIENT)
Dept: LAB | Facility: HOSPITAL | Age: 56
End: 2021-01-27

## 2021-01-27 DIAGNOSIS — Z01.818 OTHER SPECIFIED PRE-OPERATIVE EXAMINATION: ICD-10-CM

## 2021-01-27 PROCEDURE — U0004 COV-19 TEST NON-CDC HGH THRU: HCPCS

## 2021-01-27 PROCEDURE — C9803 HOPD COVID-19 SPEC COLLECT: HCPCS

## 2021-01-28 LAB — SARS-COV-2 RNA RESP QL NAA+PROBE: NOT DETECTED

## 2021-01-29 ENCOUNTER — ANESTHESIA (OUTPATIENT)
Dept: PERIOP | Facility: HOSPITAL | Age: 56
End: 2021-01-29

## 2021-01-29 ENCOUNTER — ANESTHESIA EVENT (OUTPATIENT)
Dept: PERIOP | Facility: HOSPITAL | Age: 56
End: 2021-01-29

## 2021-01-29 ENCOUNTER — HOSPITAL ENCOUNTER (OUTPATIENT)
Facility: HOSPITAL | Age: 56
Setting detail: HOSPITAL OUTPATIENT SURGERY
Discharge: HOME | End: 2021-01-29
Attending: PLASTIC SURGERY | Admitting: PLASTIC SURGERY

## 2021-01-29 VITALS
WEIGHT: 183.2 LBS | TEMPERATURE: 97.6 F | BODY MASS INDEX: 25.65 KG/M2 | HEART RATE: 79 BPM | OXYGEN SATURATION: 97 % | HEIGHT: 71 IN | DIASTOLIC BLOOD PRESSURE: 85 MMHG | RESPIRATION RATE: 16 BRPM | SYSTOLIC BLOOD PRESSURE: 163 MMHG

## 2021-01-29 DIAGNOSIS — Z98.890 HISTORY OF FACELIFT: Primary | ICD-10-CM

## 2021-01-29 PROCEDURE — 25010000002 MIDAZOLAM PER 1 MG: Performed by: ANESTHESIOLOGY

## 2021-01-29 PROCEDURE — 25010000002 ONDANSETRON PER 1 MG: Performed by: NURSE ANESTHETIST, CERTIFIED REGISTERED

## 2021-01-29 PROCEDURE — 25010000003 CEFAZOLIN IN DEXTROSE 2-4 GM/100ML-% SOLUTION: Performed by: PLASTIC SURGERY

## 2021-01-29 PROCEDURE — 25010000002 FENTANYL CITRATE (PF) 100 MCG/2ML SOLUTION: Performed by: NURSE ANESTHETIST, CERTIFIED REGISTERED

## 2021-01-29 PROCEDURE — 25010000002 PROPOFOL 10 MG/ML EMULSION: Performed by: NURSE ANESTHETIST, CERTIFIED REGISTERED

## 2021-01-29 PROCEDURE — 25010000002 MIDAZOLAM PER 1 MG: Performed by: NURSE ANESTHETIST, CERTIFIED REGISTERED

## 2021-01-29 RX ORDER — SCOLOPAMINE TRANSDERMAL SYSTEM 1 MG/1
PATCH, EXTENDED RELEASE TRANSDERMAL
Status: COMPLETED
Start: 2021-01-29 | End: 2021-01-29

## 2021-01-29 RX ORDER — WOUND DRESSING ADHESIVE - LIQUID
LIQUID MISCELLANEOUS AS NEEDED
Status: DISCONTINUED | OUTPATIENT
Start: 2021-01-29 | End: 2021-01-29 | Stop reason: HOSPADM

## 2021-01-29 RX ORDER — ROCURONIUM BROMIDE 10 MG/ML
INJECTION, SOLUTION INTRAVENOUS AS NEEDED
Status: DISCONTINUED | OUTPATIENT
Start: 2021-01-29 | End: 2021-01-29 | Stop reason: SURG

## 2021-01-29 RX ORDER — FAMOTIDINE 10 MG/ML
20 INJECTION, SOLUTION INTRAVENOUS ONCE
Status: COMPLETED | OUTPATIENT
Start: 2021-01-29 | End: 2021-01-29

## 2021-01-29 RX ORDER — MIDAZOLAM HYDROCHLORIDE 1 MG/ML
1 INJECTION INTRAMUSCULAR; INTRAVENOUS
Status: DISCONTINUED | OUTPATIENT
Start: 2021-01-29 | End: 2021-01-29 | Stop reason: HOSPADM

## 2021-01-29 RX ORDER — MAGNESIUM HYDROXIDE 1200 MG/15ML
LIQUID ORAL AS NEEDED
Status: DISCONTINUED | OUTPATIENT
Start: 2021-01-29 | End: 2021-01-29 | Stop reason: HOSPADM

## 2021-01-29 RX ORDER — PROPARACAINE HYDROCHLORIDE 5 MG/ML
1 SOLUTION/ DROPS OPHTHALMIC ONCE
Status: COMPLETED | OUTPATIENT
Start: 2021-01-29 | End: 2021-01-29

## 2021-01-29 RX ORDER — DEXMEDETOMIDINE HYDROCHLORIDE 100 UG/ML
INJECTION, SOLUTION INTRAVENOUS AS NEEDED
Status: DISCONTINUED | OUTPATIENT
Start: 2021-01-29 | End: 2021-01-29 | Stop reason: SURG

## 2021-01-29 RX ORDER — FLUMAZENIL 0.1 MG/ML
0.2 INJECTION INTRAVENOUS AS NEEDED
Status: DISCONTINUED | OUTPATIENT
Start: 2021-01-29 | End: 2021-01-29 | Stop reason: HOSPADM

## 2021-01-29 RX ORDER — LIDOCAINE HYDROCHLORIDE 10 MG/ML
0.5 INJECTION, SOLUTION EPIDURAL; INFILTRATION; INTRACAUDAL; PERINEURAL ONCE AS NEEDED
Status: DISCONTINUED | OUTPATIENT
Start: 2021-01-29 | End: 2021-01-29 | Stop reason: HOSPADM

## 2021-01-29 RX ORDER — ONDANSETRON 2 MG/ML
INJECTION INTRAMUSCULAR; INTRAVENOUS AS NEEDED
Status: DISCONTINUED | OUTPATIENT
Start: 2021-01-29 | End: 2021-01-29 | Stop reason: SURG

## 2021-01-29 RX ORDER — LIDOCAINE HYDROCHLORIDE AND EPINEPHRINE 5; 5 MG/ML; UG/ML
INJECTION, SOLUTION INFILTRATION; PERINEURAL AS NEEDED
Status: DISCONTINUED | OUTPATIENT
Start: 2021-01-29 | End: 2021-01-29 | Stop reason: HOSPADM

## 2021-01-29 RX ORDER — MIDAZOLAM HYDROCHLORIDE 1 MG/ML
INJECTION INTRAMUSCULAR; INTRAVENOUS AS NEEDED
Status: DISCONTINUED | OUTPATIENT
Start: 2021-01-29 | End: 2021-01-29 | Stop reason: SURG

## 2021-01-29 RX ORDER — CEFAZOLIN SODIUM 2 G/100ML
2 INJECTION, SOLUTION INTRAVENOUS ONCE
Status: COMPLETED | OUTPATIENT
Start: 2021-01-29 | End: 2021-01-29

## 2021-01-29 RX ORDER — PROMETHAZINE HYDROCHLORIDE 25 MG/1
25 SUPPOSITORY RECTAL ONCE AS NEEDED
Status: DISCONTINUED | OUTPATIENT
Start: 2021-01-29 | End: 2021-01-29 | Stop reason: HOSPADM

## 2021-01-29 RX ORDER — NALOXONE HCL 0.4 MG/ML
0.2 VIAL (ML) INJECTION AS NEEDED
Status: DISCONTINUED | OUTPATIENT
Start: 2021-01-29 | End: 2021-01-29 | Stop reason: HOSPADM

## 2021-01-29 RX ORDER — HYDROMORPHONE HYDROCHLORIDE 1 MG/ML
0.5 INJECTION, SOLUTION INTRAMUSCULAR; INTRAVENOUS; SUBCUTANEOUS
Status: DISCONTINUED | OUTPATIENT
Start: 2021-01-29 | End: 2021-01-29 | Stop reason: HOSPADM

## 2021-01-29 RX ORDER — SCOLOPAMINE TRANSDERMAL SYSTEM 1 MG/1
PATCH, EXTENDED RELEASE TRANSDERMAL AS NEEDED
Status: DISCONTINUED | OUTPATIENT
Start: 2021-01-29 | End: 2021-01-29 | Stop reason: SURG

## 2021-01-29 RX ORDER — SODIUM CHLORIDE, SODIUM LACTATE, POTASSIUM CHLORIDE, CALCIUM CHLORIDE 600; 310; 30; 20 MG/100ML; MG/100ML; MG/100ML; MG/100ML
9 INJECTION, SOLUTION INTRAVENOUS CONTINUOUS
Status: DISCONTINUED | OUTPATIENT
Start: 2021-01-29 | End: 2021-01-29 | Stop reason: HOSPADM

## 2021-01-29 RX ORDER — GINSENG 100 MG
CAPSULE ORAL AS NEEDED
Status: DISCONTINUED | OUTPATIENT
Start: 2021-01-29 | End: 2021-01-29 | Stop reason: HOSPADM

## 2021-01-29 RX ORDER — SODIUM CHLORIDE 0.9 % (FLUSH) 0.9 %
3-10 SYRINGE (ML) INJECTION AS NEEDED
Status: DISCONTINUED | OUTPATIENT
Start: 2021-01-29 | End: 2021-01-29 | Stop reason: HOSPADM

## 2021-01-29 RX ORDER — DIPHENHYDRAMINE HYDROCHLORIDE 50 MG/ML
12.5 INJECTION INTRAMUSCULAR; INTRAVENOUS
Status: DISCONTINUED | OUTPATIENT
Start: 2021-01-29 | End: 2021-01-29 | Stop reason: HOSPADM

## 2021-01-29 RX ORDER — SODIUM CHLORIDE 0.9 % (FLUSH) 0.9 %
3 SYRINGE (ML) INJECTION EVERY 12 HOURS SCHEDULED
Status: DISCONTINUED | OUTPATIENT
Start: 2021-01-29 | End: 2021-01-29 | Stop reason: HOSPADM

## 2021-01-29 RX ORDER — HYDROCODONE BITARTRATE AND ACETAMINOPHEN 7.5; 325 MG/1; MG/1
1 TABLET ORAL ONCE AS NEEDED
Status: COMPLETED | OUTPATIENT
Start: 2021-01-29 | End: 2021-01-29

## 2021-01-29 RX ORDER — MIDAZOLAM HYDROCHLORIDE 1 MG/ML
2 INJECTION INTRAMUSCULAR; INTRAVENOUS
Status: DISCONTINUED | OUTPATIENT
Start: 2021-01-29 | End: 2021-01-29 | Stop reason: HOSPADM

## 2021-01-29 RX ORDER — PROMETHAZINE HYDROCHLORIDE 25 MG/1
25 TABLET ORAL ONCE AS NEEDED
Status: DISCONTINUED | OUTPATIENT
Start: 2021-01-29 | End: 2021-01-29 | Stop reason: HOSPADM

## 2021-01-29 RX ORDER — DIPHENHYDRAMINE HCL 25 MG
25 CAPSULE ORAL
Status: DISCONTINUED | OUTPATIENT
Start: 2021-01-29 | End: 2021-01-29 | Stop reason: HOSPADM

## 2021-01-29 RX ORDER — ONDANSETRON 2 MG/ML
4 INJECTION INTRAMUSCULAR; INTRAVENOUS ONCE AS NEEDED
Status: DISCONTINUED | OUTPATIENT
Start: 2021-01-29 | End: 2021-01-29 | Stop reason: HOSPADM

## 2021-01-29 RX ORDER — LIDOCAINE HYDROCHLORIDE 20 MG/ML
INJECTION, SOLUTION INFILTRATION; PERINEURAL AS NEEDED
Status: DISCONTINUED | OUTPATIENT
Start: 2021-01-29 | End: 2021-01-29 | Stop reason: SURG

## 2021-01-29 RX ORDER — OXYCODONE HYDROCHLORIDE AND ACETAMINOPHEN 5; 325 MG/1; MG/1
1-2 TABLET ORAL EVERY 4 HOURS PRN
Qty: 12 TABLET | Refills: 0 | OUTPATIENT
Start: 2021-01-29 | End: 2021-07-02

## 2021-01-29 RX ORDER — FENTANYL CITRATE 50 UG/ML
50 INJECTION, SOLUTION INTRAMUSCULAR; INTRAVENOUS
Status: DISCONTINUED | OUTPATIENT
Start: 2021-01-29 | End: 2021-01-29 | Stop reason: HOSPADM

## 2021-01-29 RX ORDER — EPHEDRINE SULFATE 50 MG/ML
5 INJECTION, SOLUTION INTRAVENOUS ONCE AS NEEDED
Status: DISCONTINUED | OUTPATIENT
Start: 2021-01-29 | End: 2021-01-29 | Stop reason: HOSPADM

## 2021-01-29 RX ORDER — BACITRACIN 500 [USP'U]/G
OINTMENT OPHTHALMIC 3 TIMES DAILY
Status: DISCONTINUED | OUTPATIENT
Start: 2021-01-29 | End: 2021-01-29 | Stop reason: HOSPADM

## 2021-01-29 RX ORDER — BUPIVACAINE HYDROCHLORIDE AND EPINEPHRINE 2.5; 5 MG/ML; UG/ML
INJECTION, SOLUTION INFILTRATION; PERINEURAL AS NEEDED
Status: DISCONTINUED | OUTPATIENT
Start: 2021-01-29 | End: 2021-01-29 | Stop reason: HOSPADM

## 2021-01-29 RX ORDER — PROPOFOL 10 MG/ML
VIAL (ML) INTRAVENOUS AS NEEDED
Status: DISCONTINUED | OUTPATIENT
Start: 2021-01-29 | End: 2021-01-29 | Stop reason: SURG

## 2021-01-29 RX ORDER — KETAMINE HYDROCHLORIDE 10 MG/ML
INJECTION INTRAMUSCULAR; INTRAVENOUS AS NEEDED
Status: DISCONTINUED | OUTPATIENT
Start: 2021-01-29 | End: 2021-01-29 | Stop reason: SURG

## 2021-01-29 RX ORDER — FENTANYL CITRATE 50 UG/ML
INJECTION, SOLUTION INTRAMUSCULAR; INTRAVENOUS AS NEEDED
Status: DISCONTINUED | OUTPATIENT
Start: 2021-01-29 | End: 2021-01-29 | Stop reason: SURG

## 2021-01-29 RX ORDER — LABETALOL HYDROCHLORIDE 5 MG/ML
5 INJECTION, SOLUTION INTRAVENOUS
Status: DISCONTINUED | OUTPATIENT
Start: 2021-01-29 | End: 2021-01-29 | Stop reason: HOSPADM

## 2021-01-29 RX ORDER — OXYCODONE AND ACETAMINOPHEN 7.5; 325 MG/1; MG/1
1 TABLET ORAL ONCE AS NEEDED
Status: DISCONTINUED | OUTPATIENT
Start: 2021-01-29 | End: 2021-01-29 | Stop reason: HOSPADM

## 2021-01-29 RX ADMIN — MIDAZOLAM 2 MG: 1 INJECTION INTRAMUSCULAR; INTRAVENOUS at 08:16

## 2021-01-29 RX ADMIN — ONDANSETRON HYDROCHLORIDE 4 MG: 2 SOLUTION INTRAMUSCULAR; INTRAVENOUS at 11:08

## 2021-01-29 RX ADMIN — DEXMEDETOMIDINE HYDROCHLORIDE 4 MCG: 100 INJECTION, SOLUTION, CONCENTRATE INTRAVENOUS at 11:30

## 2021-01-29 RX ADMIN — KETAMINE HYDROCHLORIDE 40 MG: 10 INJECTION INTRAMUSCULAR; INTRAVENOUS at 08:21

## 2021-01-29 RX ADMIN — SODIUM CHLORIDE, POTASSIUM CHLORIDE, SODIUM LACTATE AND CALCIUM CHLORIDE 9 ML/HR: 600; 310; 30; 20 INJECTION, SOLUTION INTRAVENOUS at 06:19

## 2021-01-29 RX ADMIN — ROCURONIUM BROMIDE 50 MG: 10 INJECTION INTRAVENOUS at 07:37

## 2021-01-29 RX ADMIN — FENTANYL CITRATE 100 MCG: 50 INJECTION INTRAMUSCULAR; INTRAVENOUS at 07:32

## 2021-01-29 RX ADMIN — SODIUM CHLORIDE, POTASSIUM CHLORIDE, SODIUM LACTATE AND CALCIUM CHLORIDE: 600; 310; 30; 20 INJECTION, SOLUTION INTRAVENOUS at 10:23

## 2021-01-29 RX ADMIN — MIDAZOLAM 1 MG: 1 INJECTION INTRAMUSCULAR; INTRAVENOUS at 06:59

## 2021-01-29 RX ADMIN — SCOPALAMINE 1 PATCH: 1 PATCH, EXTENDED RELEASE TRANSDERMAL at 07:21

## 2021-01-29 RX ADMIN — CEFAZOLIN SODIUM 2 G: 2 INJECTION, SOLUTION INTRAVENOUS at 07:26

## 2021-01-29 RX ADMIN — KETAMINE HYDROCHLORIDE 10 MG: 10 INJECTION INTRAMUSCULAR; INTRAVENOUS at 09:27

## 2021-01-29 RX ADMIN — HYDROCODONE BITARTRATE AND ACETAMINOPHEN 1 TABLET: 7.5; 325 TABLET ORAL at 13:59

## 2021-01-29 RX ADMIN — PROPARACAINE HYDROCHLORIDE 1 DROP: 5 SOLUTION/ DROPS OPHTHALMIC at 13:58

## 2021-01-29 RX ADMIN — DEXMEDETOMIDINE HYDROCHLORIDE 4 MCG: 100 INJECTION, SOLUTION, CONCENTRATE INTRAVENOUS at 11:23

## 2021-01-29 RX ADMIN — PROPOFOL 200 MG: 10 INJECTION, EMULSION INTRAVENOUS at 07:36

## 2021-01-29 RX ADMIN — FAMOTIDINE 20 MG: 10 INJECTION INTRAVENOUS at 06:59

## 2021-01-29 RX ADMIN — CEFAZOLIN SODIUM 2 G: 2 INJECTION, SOLUTION INTRAVENOUS at 11:29

## 2021-01-29 RX ADMIN — LIDOCAINE HYDROCHLORIDE 100 MG: 20 INJECTION, SOLUTION INFILTRATION; PERINEURAL at 07:36

## 2021-01-29 NOTE — ANESTHESIA PREPROCEDURE EVALUATION
Anesthesia Evaluation     Patient summary reviewed and Nursing notes reviewed   no history of anesthetic complications:  NPO Solid Status: > 8 hours  NPO Liquid Status: > 2 hours           Airway   Mallampati: II  Dental - normal exam     Pulmonary - normal exam   (+) pulmonary embolism,   Cardiovascular - negative cardio ROS and normal exam        Neuro/Psych- negative ROS  GI/Hepatic/Renal/Endo    (+)  hiatal hernia, GERD,      Musculoskeletal     Abdominal    Substance History      OB/GYN          Other                        Anesthesia Plan    ASA 2     general     intravenous induction     Anesthetic plan, all risks, benefits, and alternatives have been provided, discussed and informed consent has been obtained with: patient.

## 2021-01-29 NOTE — ANESTHESIA POSTPROCEDURE EVALUATION
"Patient: Oliverio Corrales    Procedure Summary     Date: 01/29/21 Room / Location: [facility] OR 03 / [facility] MAIN OR    Anesthesia Start: 0726 Anesthesia Stop: 1148    Procedure: FULL FACE LIFT WITH  FAT TRANSFER (N/A Face) Diagnosis:     Surgeon: Roberto Connelly MD Provider: Dk Sawyer MD    Anesthesia Type: general ASA Status: 2          Anesthesia Type: general    Vitals  Vitals Value Taken Time   /99 01/29/21 1425   Temp 36.4 °C (97.6 °F) 01/29/21 1145   Pulse 63 01/29/21 1433   Resp 14 01/29/21 1425   SpO2 100 % 01/29/21 1433   Vitals shown include unvalidated device data.        Post Anesthesia Care and Evaluation    Patient location during evaluation: bedside  Patient participation: complete - patient participated  Level of consciousness: awake and alert  Pain management: adequate  Airway patency: patent  Anesthetic complications: No anesthetic complications  PONV Status: none  Cardiovascular status: acceptable  Respiratory status: acceptable  Hydration status: acceptable    Comments: BP (!) 166/101   Pulse 77   Temp 36.4 °C (97.6 °F) (Oral)   Resp 16   Ht 180.3 cm (71\")   Wt 83.1 kg (183 lb 3.2 oz)   SpO2 98%   BMI 25.55 kg/m²         "

## 2021-01-29 NOTE — ANESTHESIA PROCEDURE NOTES
Airway  Urgency: elective    Date/Time: 1/29/2021 7:39 AM  Airway not difficult    General Information and Staff    Patient location during procedure: OR  Anesthesiologist: Dk Sawyer MD  CRNA: Samantha Wells CRNA    Indications and Patient Condition  Indications for airway management: airway protection    Preoxygenated: yes  Mask difficulty assessment: 1 - vent by mask    Final Airway Details  Final airway type: endotracheal airway      Successful airway: ETT  Cuffed: yes   Successful intubation technique: direct laryngoscopy  Facilitating devices/methods: cricoid pressure  Endotracheal tube insertion site: oral  Blade: [place]  Blade size: 2  ETT size (mm): 7.5  Cormack-Lehane Classification: grade I - full view of glottis  Placement verified by: chest auscultation and capnometry   Cuff volume (mL): 5  Measured from: lips  Number of attempts at approach: 1  Assessment: lips, teeth, and gum same as pre-op    Additional Comments  EBBS: ETCO2+: Atraumatic intubation

## 2021-02-09 ENCOUNTER — OFFICE VISIT (OUTPATIENT)
Dept: SURGERY | Facility: CLINIC | Age: 56
End: 2021-02-09

## 2021-02-09 VITALS
HEIGHT: 71 IN | SYSTOLIC BLOOD PRESSURE: 142 MMHG | OXYGEN SATURATION: 97 % | DIASTOLIC BLOOD PRESSURE: 84 MMHG | BODY MASS INDEX: 26.05 KG/M2 | HEART RATE: 74 BPM | WEIGHT: 186.1 LBS

## 2021-02-09 DIAGNOSIS — Z09 FOLLOW UP: Primary | ICD-10-CM

## 2021-02-09 PROBLEM — I48.91 AFIB (HCC): Status: ACTIVE | Noted: 2018-10-08

## 2021-02-09 PROBLEM — R07.81 PLEURITIC CHEST PAIN: Status: ACTIVE | Noted: 2017-05-22

## 2021-02-09 PROBLEM — M65.332 ACQUIRED TRIGGER FINGER OF LEFT MIDDLE FINGER: Status: ACTIVE | Noted: 2020-01-14

## 2021-02-09 PROBLEM — S83.232A COMPLEX TEAR OF MEDIAL MENISCUS OF LEFT KNEE AS CURRENT INJURY: Status: ACTIVE | Noted: 2020-09-18

## 2021-02-09 PROBLEM — R06.00 DYSPNEA: Status: ACTIVE | Noted: 2018-10-08

## 2021-02-09 PROBLEM — Z86.711 HX PULMONARY EMBOLISM: Status: ACTIVE | Noted: 2017-05-22

## 2021-02-09 PROBLEM — M67.813 BICEPS TENDINOSIS OF RIGHT SHOULDER: Status: ACTIVE | Noted: 2020-08-27

## 2021-02-09 PROBLEM — I25.10 ASHD (ARTERIOSCLEROTIC HEART DISEASE): Status: ACTIVE | Noted: 2018-10-08

## 2021-02-09 PROBLEM — R00.0 TACHYCARDIA: Status: ACTIVE | Noted: 2017-05-22

## 2021-02-09 PROCEDURE — 99024 POSTOP FOLLOW-UP VISIT: CPT | Performed by: SURGERY

## 2021-02-09 NOTE — PROGRESS NOTES
"Chief complaint:    Chief Complaint   Patient presents with   • Post-op Hernia     ing       History of Present Illness    This is Oliverio Corrales 55 y.o. status post open left inguinal hernia repair and is doing very well.  Patient denies fever, chills, nausea or vomiting.  Patient's pain is well-controlled.      The following portions of the patient's history were reviewed and updated as appropriate: allergies, current medications, past family history, past medical history, past social history, past surgical history and problem list.    Vitals:    02/09/21 1010   BP: 142/84   BP Location: Left arm   Patient Position: Sitting   Cuff Size: Adult   Pulse: 74   SpO2: 97%   Weight: 84.4 kg (186 lb 1.6 oz)   Height: 180.3 cm (71\")       Physical Exam  Gen.: Patient is alert and oriented ×3, no acute distress  HEENT: Normal cephalic, atraumatic, moist mucous membranes, anicteric  Incision is well-healed without evidence of infection, herniation or seroma.    Assessment/plan:    S/P open left inguinal hernia repair  I have instructed the patient not lift greater than 10 pounds for total of 6 weeks from the time of surgery.   I have instructed the patient follow-up as needed.    Qi Beltre MD  General, Minimally Invasive and Endoscopic Surgery  [facility]    [address]    [address]  [place] [address]               [place] [address]    P: [phone]  F: [phone]    Cc:  Raeann Becerra MD  "

## 2021-07-01 ENCOUNTER — APPOINTMENT (OUTPATIENT)
Dept: ULTRASOUND IMAGING | Facility: HOSPITAL | Age: 56
End: 2021-07-01

## 2021-07-01 ENCOUNTER — HOSPITAL ENCOUNTER (EMERGENCY)
Facility: HOSPITAL | Age: 56
Discharge: HOME | End: 2021-07-02
Attending: EMERGENCY MEDICINE | Admitting: EMERGENCY MEDICINE

## 2021-07-01 DIAGNOSIS — S30.1XXA TRAUMATIC HEMATOMA OF GROIN, INITIAL ENCOUNTER: Primary | ICD-10-CM

## 2021-07-01 DIAGNOSIS — K40.90 HERNIA, INGUINAL, LEFT: ICD-10-CM

## 2021-07-01 PROCEDURE — 93976 VASCULAR STUDY: CPT

## 2021-07-01 PROCEDURE — 99283 EMERGENCY DEPT VISIT LOW MDM: CPT

## 2021-07-01 PROCEDURE — 76870 US EXAM SCROTUM: CPT

## 2021-07-01 RX ORDER — SODIUM CHLORIDE 0.9 % (FLUSH) 0.9 %
10 SYRINGE (ML) INJECTION AS NEEDED
Status: DISCONTINUED | OUTPATIENT
Start: 2021-07-01 | End: 2021-07-02 | Stop reason: HOSPADM

## 2021-07-02 ENCOUNTER — APPOINTMENT (OUTPATIENT)
Dept: CT IMAGING | Facility: HOSPITAL | Age: 56
End: 2021-07-02

## 2021-07-02 VITALS
RESPIRATION RATE: 18 BRPM | HEART RATE: 93 BPM | BODY MASS INDEX: 25.2 KG/M2 | WEIGHT: 180 LBS | OXYGEN SATURATION: 98 % | TEMPERATURE: 96.2 F | DIASTOLIC BLOOD PRESSURE: 88 MMHG | HEIGHT: 71 IN | SYSTOLIC BLOOD PRESSURE: 130 MMHG

## 2021-07-02 LAB
ALBUMIN SERPL-MCNC: 4.1 G/DL (ref 3.5–5.2)
ALBUMIN/GLOB SERPL: 2.1 G/DL
ALP SERPL-CCNC: 123 U/L (ref 39–117)
ALT SERPL W P-5'-P-CCNC: 25 U/L (ref 1–41)
ANION GAP SERPL CALCULATED.3IONS-SCNC: 8.3 MMOL/L (ref 5–15)
AST SERPL-CCNC: 23 U/L (ref 1–40)
BACTERIA UR QL AUTO: ABNORMAL /HPF
BASOPHILS # BLD AUTO: 0.07 10*3/MM3 (ref 0–0.2)
BASOPHILS NFR BLD AUTO: 1 % (ref 0–1.5)
BILIRUB SERPL-MCNC: 1.2 MG/DL (ref 0–1.2)
BILIRUB UR QL STRIP: NEGATIVE
BUN SERPL-MCNC: 16 MG/DL (ref 6–20)
BUN/CREAT SERPL: 13.7 (ref 7–25)
CALCIUM SPEC-SCNC: 8.9 MG/DL (ref 8.6–10.5)
CHLORIDE SERPL-SCNC: 104 MMOL/L (ref 98–107)
CLARITY UR: CLEAR
CO2 SERPL-SCNC: 28.7 MMOL/L (ref 22–29)
COLOR UR: YELLOW
CREAT SERPL-MCNC: 1.17 MG/DL (ref 0.76–1.27)
DEPRECATED RDW RBC AUTO: 40.1 FL (ref 37–54)
EOSINOPHIL # BLD AUTO: 0.16 10*3/MM3 (ref 0–0.4)
EOSINOPHIL NFR BLD AUTO: 2.4 % (ref 0.3–6.2)
ERYTHROCYTE [DISTWIDTH] IN BLOOD BY AUTOMATED COUNT: 12.3 % (ref 12.3–15.4)
GFR SERPL CREATININE-BSD FRML MDRD: 64 ML/MIN/1.73
GLOBULIN UR ELPH-MCNC: 2 GM/DL
GLUCOSE SERPL-MCNC: 81 MG/DL (ref 65–99)
GLUCOSE UR STRIP-MCNC: ABNORMAL MG/DL
HCT VFR BLD AUTO: 45.7 % (ref 37.5–51)
HGB BLD-MCNC: 15.8 G/DL (ref 13–17.7)
HGB UR QL STRIP.AUTO: ABNORMAL
HYALINE CASTS UR QL AUTO: ABNORMAL /LPF
IMM GRANULOCYTES # BLD AUTO: 0.03 10*3/MM3 (ref 0–0.05)
IMM GRANULOCYTES NFR BLD AUTO: 0.4 % (ref 0–0.5)
KETONES UR QL STRIP: NEGATIVE
LEUKOCYTE ESTERASE UR QL STRIP.AUTO: NEGATIVE
LYMPHOCYTES # BLD AUTO: 2.82 10*3/MM3 (ref 0.7–3.1)
LYMPHOCYTES NFR BLD AUTO: 42.1 % (ref 19.6–45.3)
MCH RBC QN AUTO: 31.5 PG (ref 26.6–33)
MCHC RBC AUTO-ENTMCNC: 34.6 G/DL (ref 31.5–35.7)
MCV RBC AUTO: 91 FL (ref 79–97)
MONOCYTES # BLD AUTO: 0.58 10*3/MM3 (ref 0.1–0.9)
MONOCYTES NFR BLD AUTO: 8.7 % (ref 5–12)
NEUTROPHILS NFR BLD AUTO: 3.04 10*3/MM3 (ref 1.7–7)
NEUTROPHILS NFR BLD AUTO: 45.4 % (ref 42.7–76)
NITRITE UR QL STRIP: NEGATIVE
NRBC BLD AUTO-RTO: 0 /100 WBC (ref 0–0.2)
PH UR STRIP.AUTO: <=5 [PH] (ref 5–8)
PLATELET # BLD AUTO: 203 10*3/MM3 (ref 140–450)
PMV BLD AUTO: 9.7 FL (ref 6–12)
POTASSIUM SERPL-SCNC: 3.9 MMOL/L (ref 3.5–5.2)
PROT SERPL-MCNC: 6.1 G/DL (ref 6–8.5)
PROT UR QL STRIP: NEGATIVE
RBC # BLD AUTO: 5.02 10*6/MM3 (ref 4.14–5.8)
RBC # UR: ABNORMAL /HPF
REF LAB TEST METHOD: ABNORMAL
SODIUM SERPL-SCNC: 141 MMOL/L (ref 136–145)
SP GR UR STRIP: 1.02 (ref 1–1.03)
SQUAMOUS #/AREA URNS HPF: ABNORMAL /HPF
UROBILINOGEN UR QL STRIP: ABNORMAL
WBC # BLD AUTO: 6.7 10*3/MM3 (ref 3.4–10.8)
WBC UR QL AUTO: ABNORMAL /HPF

## 2021-07-02 PROCEDURE — 85025 COMPLETE CBC W/AUTO DIFF WBC: CPT | Performed by: EMERGENCY MEDICINE

## 2021-07-02 PROCEDURE — 74177 CT ABD & PELVIS W/CONTRAST: CPT

## 2021-07-02 PROCEDURE — 80053 COMPREHEN METABOLIC PANEL: CPT | Performed by: EMERGENCY MEDICINE

## 2021-07-02 PROCEDURE — 81001 URINALYSIS AUTO W/SCOPE: CPT | Performed by: EMERGENCY MEDICINE

## 2021-07-02 PROCEDURE — 25010000002 IOPAMIDOL 61 % SOLUTION: Performed by: EMERGENCY MEDICINE

## 2021-07-02 RX ORDER — HYDROCODONE BITARTRATE AND ACETAMINOPHEN 5; 325 MG/1; MG/1
1 TABLET ORAL EVERY 6 HOURS PRN
Qty: 12 TABLET | Refills: 0 | Status: SHIPPED | OUTPATIENT
Start: 2021-07-02 | End: 2022-06-03 | Stop reason: HOSPADM

## 2021-07-02 RX ADMIN — IOPAMIDOL 85 ML: 612 INJECTION, SOLUTION INTRAVENOUS at 01:44

## 2021-07-02 NOTE — DISCHARGE INSTRUCTIONS
Apply cool compresses to your bruising for up to 20 minutes at a time.  Do not lift anything greater than 15 pounds for the next 4 days.  Do follow-up with your surgeon.  Call her in the morning.  Please return to the emergency department if symptoms worsen.

## 2021-07-02 NOTE — ED NOTES
Pt to ER from home c/o groin pain, bleeding, and distention after table saw kicked back a piece of wood into his groin a few days ago.    This RN is wearing mask, gloves and goggles at all times during all patient interactions.     Chito Rodriguez, RN  07/01/21 2057

## 2021-07-02 NOTE — ED PROVIDER NOTES
EMERGENCY DEPARTMENT ENCOUNTER  Patient was placed in face mask in first look and the following protective measures were taken unless additional measures were taken and documented below in the ED course. Patient was wearing facemask when I entered the room and throughout our encounter. I wore full protective equipment throughout this patient encounter including a face mask, and gloves. Hand hygiene was performed before donning protective equipment and after removal when leaving the room.    Room Number:  16/16  Date of encounter:  7/2/2021  PCP: Raeann Becerra MD    HPI:  Context: Oliverio Corrales is a 55 y.o. male who presents to the ED c/o chief complaint of injury to right groin and penis.  Patient states he was using his table saw at home 3 days ago when a board kicked back and hit him squarely in the groin.  He has noted increasing pain and bruising, primarily on the right inguinal region.  However, he is having increasing pain to his left inguinal region.  He states he has a history of a left inguinal hernia repair performed by Dr. Beltre.  He denies hematuria, nausea or vomiting.  He denies fever or chills.  He denies abdominal distention.  He states the pain gets worse when he sits or walks long distances.  He is here to make sure that he does not have a blood clot.  He states he has a history of a DVT with a PE in the past.  He did not tolerate the Xarelto and stopped on his own.    MEDICAL HISTORY REVIEW  Reviewed in EPIC    PAST MEDICAL HISTORY  Active Ambulatory Problems     Diagnosis Date Noted   • Bilateral pulmonary embolism (CMS/HCC) 01/02/2017   • HH (hiatus hernia) 10/30/2018   • Hiatal hernia with gastroesophageal reflux disease and esophagitis 01/03/2019   • Tachycardia 05/22/2017   • Pleuritic chest pain 05/22/2017   • Intractable chronic migraine without aura and without status migrainosus 09/08/2016   • Hx pulmonary embolism 05/22/2017   • Dyspnea 10/08/2018   • Complex tear of medial  meniscus of left knee as current injury 09/18/2020   • Chronic neck pain 08/31/2015   • Biceps tendinosis of right shoulder 08/27/2020   • ASHD (arteriosclerotic heart disease) 10/08/2018   • Afib (CMS/HCC) 10/08/2018   • Acquired trigger finger of left middle finger 01/14/2020     Resolved Ambulatory Problems     Diagnosis Date Noted   • No Resolved Ambulatory Problems     Past Medical History:   Diagnosis Date   • GERD (gastroesophageal reflux disease)    • Pulmonary embolism (CMS/[facility])    • Reflux esophagitis        PAST SURGICAL HISTORY  Past Surgical History:   Procedure Laterality Date   • CHOLECYSTECTOMY     • COLONOSCOPY     • ENDOSCOPY     • FACELIFT N/A 1/29/2021    Procedure: FULL FACE LIFT WITH  FAT TRANSFER;  Surgeon: Roberto Connelly MD;  [facility];  Service: New Image;  Laterality: N/A;   • INGUINAL HERNIA REPAIR     • NISSEN FUNDOPLICATION N/A 1/3/2019    Procedure: REDO LAPAROSCOPIC NISSEN FUNDOPLICATION  WITH DAVINCI ROBOT AND MESH;  Surgeon: Qi Beltre MD;  Location: [facility] OR;  Service: DaVinci   • SHOULDER SURGERY Left    • TONSILLECTOMY         FAMILY HISTORY  Family History   Problem Relation Age of Onset   • Lung disease Father    • Malig Hyperthermia Neg Hx        SOCIAL HISTORY  Social History     Socioeconomic History   • Marital status: Single     Spouse name: Not on file   • Number of children: Not on file   • Years of education: Not on file   • Highest education level: Not on file   Tobacco Use   • Smoking status: Never Smoker   • Smokeless tobacco: Never Used   Substance and Sexual Activity   • Alcohol use: No   • Drug use: No   • Sexual activity: Defer     Birth control/protection: None       ALLERGIES  Hydrocortisone    The patient's allergies have been reviewed    REVIEW OF SYSTEMS  All systems reviewed and negative except for those discussed in HPI.     PHYSICAL EXAM  I have reviewed the triage vital signs and nursing notes.  ED Triage Vitals   Temp Heart  Rate Resp BP SpO2   07/01/21 2058 07/01/21 2058 07/01/21 2058 07/02/21 0240 07/01/21 2058   96.2 °F (35.7 °C) 93 18 130/88 98 %      Temp src Heart Rate Source Patient Position BP Location FiO2 (%)   07/01/21 2058 -- -- -- --   Tympanic             General: Mild distress.  HENT: NCAT, PERRL, Nares patent.  Eyes: no scleral icterus.  Tracking movements are intact.  Neck: trachea midline, no ROM limitations.  CV: regular rhythm, regular rate.  Respiratory: normal effort, CTAB.  Abdomen: soft, nondistended, NTTP, no rebound tenderness, no guarding or rigidity  : Patient has a palpable mildly tender mass in the right inguinal region.  It is nonpulsatile.  He has ecchymosis on the dorsum of his penis at the base.  Is nontender to palpation.  He has a left inguinal hernia palpable that is easily reducible.  Musculoskeletal: no deformity.  Neuro: alert, moves all extremities, follows commands.  Skin: warm, dry.    LAB RESULTS  Recent Results (from the past 24 hour(s))   Comprehensive Metabolic Panel    Collection Time: 07/02/21 12:13 AM    Specimen: Blood   Result Value Ref Range    Glucose 81 65 - 99 mg/dL    BUN 16 6 - 20 mg/dL    Creatinine 1.17 0.76 - 1.27 mg/dL    Sodium 141 136 - 145 mmol/L    Potassium 3.9 3.5 - 5.2 mmol/L    Chloride 104 98 - 107 mmol/L    CO2 28.7 22.0 - 29.0 mmol/L    Calcium 8.9 8.6 - 10.5 mg/dL    Total Protein 6.1 6.0 - 8.5 g/dL    Albumin 4.10 3.50 - 5.20 g/dL    ALT (SGPT) 25 1 - 41 U/L    AST (SGOT) 23 1 - 40 U/L    Alkaline Phosphatase 123 (H) 39 - 117 U/L    Total Bilirubin 1.2 0.0 - 1.2 mg/dL    eGFR Non African Amer 64 >60 mL/min/1.73    Globulin 2.0 gm/dL    A/G Ratio 2.1 g/dL    BUN/Creatinine Ratio 13.7 7.0 - 25.0    Anion Gap 8.3 5.0 - 15.0 mmol/L   CBC Auto Differential    Collection Time: 07/02/21 12:13 AM    Specimen: Blood   Result Value Ref Range    WBC 6.70 3.40 - 10.80 10*3/mm3    RBC 5.02 4.14 - 5.80 10*6/mm3    Hemoglobin 15.8 13.0 - 17.7 g/dL    Hematocrit 45.7 37.5 -  51.0 %    MCV 91.0 79.0 - 97.0 fL    MCH 31.5 26.6 - 33.0 pg    MCHC 34.6 31.5 - 35.7 g/dL    RDW 12.3 12.3 - 15.4 %    RDW-SD 40.1 37.0 - 54.0 fl    MPV 9.7 6.0 - 12.0 fL    Platelets 203 140 - 450 10*3/mm3    Neutrophil % 45.4 42.7 - 76.0 %    Lymphocyte % 42.1 19.6 - 45.3 %    Monocyte % 8.7 5.0 - 12.0 %    Eosinophil % 2.4 0.3 - 6.2 %    Basophil % 1.0 0.0 - 1.5 %    Immature Grans % 0.4 0.0 - 0.5 %    Neutrophils, Absolute 3.04 1.70 - 7.00 10*3/mm3    Lymphocytes, Absolute 2.82 0.70 - 3.10 10*3/mm3    Monocytes, Absolute 0.58 0.10 - 0.90 10*3/mm3    Eosinophils, Absolute 0.16 0.00 - 0.40 10*3/mm3    Basophils, Absolute 0.07 0.00 - 0.20 10*3/mm3    Immature Grans, Absolute 0.03 0.00 - 0.05 10*3/mm3    nRBC 0.0 0.0 - 0.2 /100 WBC   Urinalysis With Microscopic If Indicated (No Culture) - Urine, Clean Catch    Collection Time: 07/02/21 12:52 AM    Specimen: Urine, Clean Catch   Result Value Ref Range    Color, UA Yellow Yellow, Straw    Appearance, UA Clear Clear    pH, UA <=5.0 5.0 - 8.0    Specific Gravity, UA 1.022 1.005 - 1.030    Glucose,  mg/dL (1+) (A) Negative    Ketones, UA Negative Negative    Bilirubin, UA Negative Negative    Blood, UA Small (1+) (A) Negative    Protein, UA Negative Negative    Leuk Esterase, UA Negative Negative    Nitrite, UA Negative Negative    Urobilinogen, UA 0.2 E.U./dL 0.2 - 1.0 E.U./dL   Urinalysis, Microscopic Only - Urine, Clean Catch    Collection Time: 07/02/21 12:52 AM    Specimen: Urine, Clean Catch   Result Value Ref Range    RBC, UA 3-5 (A) None Seen, 0-2 /HPF    WBC, UA 0-2 None Seen, 0-2 /HPF    Bacteria, UA None Seen None Seen /HPF    Squamous Epithelial Cells, UA 0-2 None Seen, 0-2 /HPF    Hyaline Casts, UA None Seen None Seen /LPF    Methodology Manual Light Microscopy        I ordered the above labs and reviewed the results.    RADIOLOGY  US Scrotum & Testicles    Result Date: 7/1/2021  SCROTAL DOPPLER  HISTORY: Testicular pain  COMPARISON: None available.   TECHNIQUE: Grayscale, color Doppler, spectral Doppler waveform analysis was performed through the scrotum.  FINDINGS: The right testicle measures 4.2 x 3.0 x 1.9 cm. Left testicle measures 4.2 x 2.8 x 1.9 cm. Normal color Doppler flow is identified within both testicles. Overall, they are homogeneous in echotexture, although there is a calcification identified within the left testicle. Trace bilateral hydroceles are noted. Patient also has small bilateral varicoceles. There is no evidence of epididymitis. Targeted ultrasound was performed through the area of concern. On images 61 and 62, there is a suggestion of a vascular lesion which may represent a pseudoaneurysm. This is incompletely assessed on this scrotal Doppler.       1. No evidence of testicular torsion. 2. Within the area of concern within the left groin there is a suggestion of a vascular lesion, which may represent a pseudoaneurysm. This is incompletely assessed on this scrotal Doppler.  This report was finalized on 7/1/2021 10:35 PM by Dr. Mickie Estes M.D.      CT Abdomen Pelvis With Contrast    Result Date: 7/2/2021  CT OF THE ABDOMEN AND PELVIS WITH CONTRAST  HISTORY: Right groin pain and injury  COMPARISON: 01/20/2015  TECHNIQUE: Axial CT imaging was obtained through the abdomen and pelvis. IV contrast was administered.  FINDINGS: Images through the lung bases demonstrate bibasilar scarring. No suspicious hepatic lesions are seen. The spleen, stomach, duodenum, and adrenal glands appear unremarkable. Pancreas is atrophic. Gallbladder is surgically absent. The kidneys enhance symmetrically. There is no hydronephrosis. Simple appearing right renal cysts are stable when compared to January 2015. There is calcification of the aorta, although it is relatively mild. Urinary bladder is unremarkable. There are dystrophic calcifications of the prostate gland. There is no bowel obstruction. There is colonic diverticulosis. There is no diverticulitis.  There is no evidence of appendicitis. The iliac arteries are patent. Exam was not optimized for assessment of the venous structures. The patient has soft tissue stranding which is seen overlying the mons pubis, particularly on the right. However, an organized hematoma is not seen. Earlier ultrasound suggested possible pseudoaneurysm, although I don't see any evidence of pseudoaneurysm on this study. No acute osseous abnormalities are seen. There are bilateral pars defects at L5-S1, with mild spondylolisthesis. Spondylolisthesis.      The patient has some soft tissue stranding which is seen overlying the mons pubis, particularly on the right. However, no organized hematoma is seen. There is no convincing evidence of vascular injury.  Radiation dose reduction techniques were utilized, including automated exposure control and exposure modulation based on body size.  This report was finalized on 7/2/2021 2:13 AM by Dr. Mickie Estes M.D.        I ordered the above noted radiological studies. I reviewed the images and results. I agree with the radiologist interpretation.    PROCEDURES  Procedures    MEDICATIONS GIVEN IN ER  Medications   iopamidol (ISOVUE-300) 61 % injection 100 mL (85 mL Intravenous Given 7/2/21 0144)       PROGRESS, DATA ANALYSIS, CONSULTS, AND MEDICAL DECISION MAKING  A complete history and physical exam have been performed.  All available laboratory and imaging results have been reviewed by myself prior to disposition.    MDM  After the initial H&P, I discussed pertinent information from history and physical exam with patient/family.  Discussed differential diagnosis.  Discussed plan for ED evaluation/work-up/treatment.  All questions answered.  Patient/family is agreeable with plan.  ED Course as of Jul 02 0639 Fri Jul 02, 2021   0001 Patient presents with increasing right inguinal pain after trauma 3 days ago.  We will perform a CT of the abdomen pelvis to rule out recurrence of his inguinal  hernia and to rule out incarceration.  My suspicion is that patient does not have a pseudoaneurysm but actually has a hematoma.    [DE]   0231 I have updated patient on the results of his CT.  I advised patient to follow-up with his surgeon, Dr. Beltre.  Recommended cool compresses to the groin and we will give pain control.  Advised him to not lift anything greater than 15 pounds until he sees Dr. Beltre.    [DE]      ED Course User Index  [DE] Marky Pineda MD       AS OF 06:39 EDT VITALS:    BP - 130/88  HR - 93  TEMP - 96.2 °F (35.7 °C) (Tympanic)  O2 SATS - 98%    DIAGNOSIS  Final diagnoses:   Traumatic hematoma of groin, initial encounter   Hernia, inguinal, left         DISPOSITION    DISCHARGE    Patient discharged in stable condition.    Reviewed implications of results, diagnosis, meds, responsibility to follow up, warning signs and symptoms of possible worsening, potential complications and reasons to return to ER.    Patient/Family voiced understanding of above instructions.    Discussed plan for discharge, as there is no emergent indication for admission. Patient referred to primary care provider for BP management due to today's BP. Pt/family is agreeable and understands need for follow up and repeat testing.  Pt is aware that discharge does not mean that nothing is wrong but it indicates no emergency is present that requires admission and they must continue care with follow-up as given below or physician of their choice.     FOLLOW-UP  Raeann Becerra MD  [address]  [facility] [id number] [phone]      As needed    Qi Beltre MD  [address]  [facility] [id number] [phone]    Schedule an appointment as soon as possible for a visit            Medication List      New Prescriptions    HYDROcodone-acetaminophen 5-325 MG per tablet  Commonly known as: NORCO  Take 1 tablet by mouth Every 6 (Six) Hours As Needed for Severe Pain .        Stop     oxyCODONE-acetaminophen 5-325 MG per tablet  Commonly known as: PERCOCET           Where to Get Your Medications      These medications were sent to [id number] - [place], [place] - [address] AT [facility] & [facility] - [phone] [facility] [phone] FX  [address] [place] [id number]    Phone: [phone]   · HYDROcodone-acetaminophen 5-325 MG per tablet          Marky Pineda MD  07/02/21 0639

## 2021-08-06 PROCEDURE — 99282 EMERGENCY DEPT VISIT SF MDM: CPT

## 2021-08-06 NOTE — ED TRIAGE NOTES
Pt c/o foreign body to right eye. Pt states that he was cutting above his head with onset.    Pt wearing mask on arrival. Staff wearing mask and goggles at time of triage.

## 2021-08-07 ENCOUNTER — HOSPITAL ENCOUNTER (EMERGENCY)
Facility: HOSPITAL | Age: 56
Discharge: HOME | End: 2021-08-07
Attending: EMERGENCY MEDICINE | Admitting: EMERGENCY MEDICINE

## 2021-08-07 VITALS
HEART RATE: 58 BPM | HEIGHT: 71 IN | OXYGEN SATURATION: 98 % | BODY MASS INDEX: 25.34 KG/M2 | WEIGHT: 181 LBS | DIASTOLIC BLOOD PRESSURE: 83 MMHG | TEMPERATURE: 98.2 F | SYSTOLIC BLOOD PRESSURE: 127 MMHG | RESPIRATION RATE: 18 BRPM

## 2021-08-07 DIAGNOSIS — H10.9 CONJUNCTIVITIS OF BOTH EYES, UNSPECIFIED CONJUNCTIVITIS TYPE: Primary | ICD-10-CM

## 2021-08-07 RX ORDER — OLOPATADINE HYDROCHLORIDE 1 MG/ML
1 SOLUTION/ DROPS OPHTHALMIC 2 TIMES DAILY
Qty: 5 ML | Refills: 0 | Status: SHIPPED | OUTPATIENT
Start: 2021-08-07

## 2021-08-07 RX ORDER — PROPARACAINE HYDROCHLORIDE 5 MG/ML
2 SOLUTION/ DROPS OPHTHALMIC ONCE
Status: DISCONTINUED | OUTPATIENT
Start: 2021-08-07 | End: 2021-08-07 | Stop reason: HOSPADM

## 2021-08-07 NOTE — ED PROVIDER NOTES
EMERGENCY DEPARTMENT ENCOUNTER    Room Number:  02/02  Date of encounter:  8/7/2021  PCP: Raeann Becerra MD  Historian: Patient      HPI:  Chief Complaint: Foreign body sensation to the right eye      Context: Oliverio Corrales is a 56 y.o. male who presents to the ED c/o possible foreign body in the right eye.  Patient states that he was sawing above his head on Sunday, 6 days prior to arrival, when he felt like something got in his eye.  Patient states that he has been using eyedrops since that time but still feels the sensation that there is something in there.  Denies any vision changes, redness, purulent drainage, or any other issues.      PAST MEDICAL HISTORY  Active Ambulatory Problems     Diagnosis Date Noted   • Bilateral pulmonary embolism (CMS/HCC) 01/02/2017   • HH (hiatus hernia) 10/30/2018   • Hiatal hernia with gastroesophageal reflux disease and esophagitis 01/03/2019   • Tachycardia 05/22/2017   • Pleuritic chest pain 05/22/2017   • Intractable chronic migraine without aura and without status migrainosus 09/08/2016   • Hx pulmonary embolism 05/22/2017   • Dyspnea 10/08/2018   • Complex tear of medial meniscus of left knee as current injury 09/18/2020   • Chronic neck pain 08/31/2015   • Biceps tendinosis of right shoulder 08/27/2020   • ASHD (arteriosclerotic heart disease) 10/08/2018   • Afib (CMS/HCC) 10/08/2018   • Acquired trigger finger of left middle finger 01/14/2020     Resolved Ambulatory Problems     Diagnosis Date Noted   • No Resolved Ambulatory Problems     Past Medical History:   Diagnosis Date   • GERD (gastroesophageal reflux disease)    • Pulmonary embolism (CMS/HCC)    • Reflux esophagitis          PAST SURGICAL HISTORY  Past Surgical History:   Procedure Laterality Date   • CHOLECYSTECTOMY     • COLONOSCOPY     • ENDOSCOPY     • FACELIFT N/A 1/29/2021    Procedure: FULL FACE LIFT WITH  FAT TRANSFER;  Surgeon: Roberto Connelly MD;  Location: [facility];  Service: [facility]  [facility];  Laterality: N/A;   • INGUINAL HERNIA REPAIR     • NISSEN FUNDOPLICATION N/A 1/3/2019    Procedure: REDO LAPAROSCOPIC NISSEN FUNDOPLICATION  WITH DAVINCI ROBOT AND MESH;  Surgeon: Qi Beltre MD;  Location: [facility];  Service: [facility]   • SHOULDER SURGERY Left    • TONSILLECTOMY           FAMILY HISTORY  Family History   Problem Relation Age of Onset   • Lung disease Father    • Malig Hyperthermia Neg Hx          SOCIAL HISTORY  Social History     Socioeconomic History   • Marital status: Single     Spouse name: Not on file   • Number of children: Not on file   • Years of education: Not on file   • Highest education level: Not on file   Tobacco Use   • Smoking status: Never Smoker   • Smokeless tobacco: Never Used   Substance and Sexual Activity   • Alcohol use: No   • Drug use: No   • Sexual activity: Defer     Birth control/protection: None         ALLERGIES  Hydrocortisone        REVIEW OF SYSTEMS  Review of Systems     All systems reviewed and negative except for those discussed in HPI.       PHYSICAL EXAM    I have reviewed the triage vital signs and nursing notes.    ED Triage Vitals   Temp Heart Rate Resp BP SpO2   08/06/21 1950 08/06/21 1950 08/06/21 1950 08/06/21 2306 08/06/21 1950   98.2 °F (36.8 °C) 80 18 139/92 98 %      Temp src Heart Rate Source Patient Position BP Location FiO2 (%)   08/06/21 1950 08/06/21 1950 08/06/21 2306 08/06/21 2306 --   Temporal Monitor Sitting Right arm        Physical Exam  GENERAL: Alert and oriented x3, not distressed  HENT: nares patent  EYES: no scleral icterus, no conjunctival exudate, mild conjunctival injection, no fluorescein dye uptake, no abrasions, ulcers, or foreign body seen  CV: regular rhythm, regular rate  RESPIRATORY: normal effort  MUSCULOSKELETAL: no deformity  NEURO: alert, moves all extremities, follows commands  SKIN: warm, dry        LAB RESULTS  No results found for this or any previous visit (from the past 24 hour(s)).    Ordered  the above labs and independently reviewed the results.        RADIOLOGY  No Radiology Exams Resulted Within Past 24 Hours    I ordered the above noted radiological studies. Reviewed by me and discussed with radiologist.  See dictation for official radiology interpretation.      PROCEDURES    Procedures      MEDICATIONS GIVEN IN ER    Medications   fluorescein ophthalmic strip 1 strip (has no administration in time range)   proparacaine (ALCAINE) 0.5 % ophthalmic solution 2 drop (has no administration in time range)         PROGRESS, DATA ANALYSIS, CONSULTS, AND MEDICAL DECISION MAKING    All labs have been independently reviewed by me.  All radiology studies have been reviewed by me and discussed with radiologist dictating the report.   EKG's independently viewed and interpreted by me.  Discussion below represents my analysis of pertinent findings related to patient's condition, differential diagnosis, treatment plan and final disposition.    DDx: Includes but not limited to corneal abrasion, corneal ulcer, foreign body, conjunctivitis, allergic conjunctivitis, chemical conjunctivitis           PPE: Both the patient and I wore a surgical mask throughout the entire patient encounter. I wore protective goggles.    AS OF 05:55 EDT VITALS:    BP - 127/83  HR - 58  TEMP - 98.2 °F (36.8 °C) (Temporal)  O2 SATS - 98%        DIAGNOSIS  Final diagnoses:   Conjunctivitis of both eyes, unspecified conjunctivitis type         DISPOSITION  DISCHARGE    Patient discharged in stable condition.    Reviewed implications of results, diagnosis, meds, responsibility to follow up, warning signs and symptoms of possible worsening, potential complications and reasons to return to ER.    Patient/Family voiced understanding of above instructions.    Discussed plan for discharge, as there is no emergent indication for admission. Patient referred to primary care provider for BP management due to today's BP. Pt/family is agreeable and  understands need for follow up and repeat testing.  Pt is aware that discharge does not mean that nothing is wrong but it indicates no emergency is present that requires admission and they must continue care with follow-up as given below or physician of their choice.     FOLLOW-UP  Raeann Becerra MD  [address] [id number] [phone]    Schedule an appointment as soon as possible for a visit            Medication List      New Prescriptions    olopatadine 0.1 % ophthalmic solution  Commonly known as: PATANOL  Administer 1 drop to both eyes 2 (Two) Times a Day.     tobramycin in sterile water (preservative free) injection-balanced salts ophthalmic solution  Apply 1-2 drops to eye(s) as directed by provider Every 4 (Four) Hours.           Where to Get Your Medications      You can get these medications from any pharmacy    Bring a paper prescription for each of these medications  · olopatadine 0.1 % ophthalmic solution  · tobramycin in sterile water (preservative free) injection-balanced salts ophthalmic solution                Marty Cartagena PA  08/07/21 0538       Marty Cartagena PA  08/07/21 0555

## 2021-08-07 NOTE — ED PROVIDER NOTES
The ANNABELLA and I have discussed this patients history, physical exam, and treatment plan. I have reviewed the documentation and personally had a face to face interaction with the patient. I affirm the documentation and agree with the treatment and plan.  The following note describes my personal findings    This patient is a 56-year-old male presenting to the emergency room with a foreign body sensation in his right eye.  He states that he was sawing wood several days ago when he felt as though the foreign body could have gotten in his eye.  He denies any visual changes.    Exam: This patient is resting comfortably and in no distress, without gross neurological deficit.  He is afebrile with stable vital signs and nontoxic in appearance.  Eye examination does show some conjunctival injection to the right eye with a normal pupillary response.  There are no corneal ulcers readily evident.    Plan: We will place tetracaine drops to the right eye as well as fluorescein stain and assess with a Woods lamp.  We will reassess and determine treatment following.      The patient was wearing a facemask upon entrance into the room and remained in such throughout their visit.  I was wearing PPE including a facemask, eye protection, as well as gloves at any point entering the room and throughout the visit     Bakari Montes De Oca MD  08/07/21 6019

## 2021-08-17 ENCOUNTER — OFFICE VISIT (OUTPATIENT)
Dept: SURGERY | Facility: CLINIC | Age: 56
End: 2021-08-17

## 2021-08-17 VITALS
BODY MASS INDEX: 25.7 KG/M2 | OXYGEN SATURATION: 98 % | HEART RATE: 60 BPM | SYSTOLIC BLOOD PRESSURE: 126 MMHG | TEMPERATURE: 97.4 F | DIASTOLIC BLOOD PRESSURE: 78 MMHG | WEIGHT: 183.6 LBS | HEIGHT: 71 IN | RESPIRATION RATE: 18 BRPM

## 2021-08-17 DIAGNOSIS — R10.32 LEFT INGUINAL PAIN: Primary | ICD-10-CM

## 2021-08-17 PROCEDURE — 99212 OFFICE O/P EST SF 10 MIN: CPT | Performed by: SURGERY

## 2021-08-17 NOTE — PROGRESS NOTES
"Chief complaint:    Chief Complaint   Patient presents with   • Hernia     pt having hernia issues       History of Present Illness    This is Oliverio Corrales 56 y.o. status post left inguinal hernia repair with mesh removal.  Patient feels like when he is working out something is pulling.  Patient denies fever, chills, nausea or vomiting.  Patient's pain is well-controlled.      The following portions of the patient's history were reviewed and updated as appropriate: allergies, current medications, past family history, past medical history, past social history, past surgical history and problem list.    Vitals:    08/17/21 1006   BP: 126/78   BP Location: Left arm   Patient Position: Sitting   Cuff Size: Adult   Pulse: 60   Resp: 18   Temp: 97.4 °F (36.3 °C)   TempSrc: Temporal   SpO2: 98%   Weight: 83.3 kg (183 lb 9.6 oz)   Height: 180.3 cm (70.98\")       Physical Exam  Gen.: Patient is alert and oriented ×3, no acute distress  HEENT: Normal cephalic, atraumatic, moist mucous membranes, anicteric  Incision is well-healed without evidence of any recurrent hernia    Assessment/plan:    S/P open left inguinal hernia repair with mesh removal  I have advised the patient that sometimes with certain exercises they can pull the scar tissue to give someone a a pulling sensation but this should resolve.  I have instructed the patient follow-up as needed.    Qi Beltre MD  General, Minimally Invasive and Endoscopic Surgery  [facility]    [address] [facility] [address] [place] [address]    [address]  [address] [address]    P: [phone]  F: [phone]    Cc:  Raeann Becerra MD  "

## 2022-06-01 ENCOUNTER — TRANSCRIBE ORDERS (OUTPATIENT)
Dept: LAB | Facility: HOSPITAL | Age: 57
End: 2022-06-01

## 2022-06-01 ENCOUNTER — LAB (OUTPATIENT)
Dept: LAB | Facility: HOSPITAL | Age: 57
End: 2022-06-01

## 2022-06-01 DIAGNOSIS — Z01.818 PRE-OP TESTING: ICD-10-CM

## 2022-06-01 DIAGNOSIS — Z01.818 PRE-OP TESTING: Primary | ICD-10-CM

## 2022-06-01 LAB
ANION GAP SERPL CALCULATED.3IONS-SCNC: 9.4 MMOL/L (ref 5–15)
BUN SERPL-MCNC: 19 MG/DL (ref 6–20)
BUN/CREAT SERPL: 18.4 (ref 7–25)
CALCIUM SPEC-SCNC: 9.4 MG/DL (ref 8.6–10.5)
CHLORIDE SERPL-SCNC: 104 MMOL/L (ref 98–107)
CO2 SERPL-SCNC: 26.6 MMOL/L (ref 22–29)
CREAT SERPL-MCNC: 1.03 MG/DL (ref 0.76–1.27)
DEPRECATED RDW RBC AUTO: 39.8 FL (ref 37–54)
EGFRCR SERPLBLD CKD-EPI 2021: 84.2 ML/MIN/1.73
ERYTHROCYTE [DISTWIDTH] IN BLOOD BY AUTOMATED COUNT: 12.3 % (ref 12.3–15.4)
GLUCOSE SERPL-MCNC: 76 MG/DL (ref 65–99)
HCT VFR BLD AUTO: 46.1 % (ref 37.5–51)
HGB BLD-MCNC: 16 G/DL (ref 13–17.7)
MCH RBC QN AUTO: 30.7 PG (ref 26.6–33)
MCHC RBC AUTO-ENTMCNC: 34.7 G/DL (ref 31.5–35.7)
MCV RBC AUTO: 88.3 FL (ref 79–97)
PLATELET # BLD AUTO: 227 10*3/MM3 (ref 140–450)
PMV BLD AUTO: 9.7 FL (ref 6–12)
POTASSIUM SERPL-SCNC: 4.7 MMOL/L (ref 3.5–5.2)
RBC # BLD AUTO: 5.22 10*6/MM3 (ref 4.14–5.8)
SARS-COV-2 ORF1AB RESP QL NAA+PROBE: NOT DETECTED
SODIUM SERPL-SCNC: 140 MMOL/L (ref 136–145)
WBC NRBC COR # BLD: 8.59 10*3/MM3 (ref 3.4–10.8)

## 2022-06-01 PROCEDURE — C9803 HOPD COVID-19 SPEC COLLECT: HCPCS

## 2022-06-01 PROCEDURE — 80048 BASIC METABOLIC PNL TOTAL CA: CPT

## 2022-06-01 PROCEDURE — 36415 COLL VENOUS BLD VENIPUNCTURE: CPT

## 2022-06-01 PROCEDURE — 85027 COMPLETE CBC AUTOMATED: CPT

## 2022-06-01 PROCEDURE — U0004 COV-19 TEST NON-CDC HGH THRU: HCPCS

## 2022-06-03 ENCOUNTER — HOSPITAL ENCOUNTER (OUTPATIENT)
Facility: HOSPITAL | Age: 57
Setting detail: HOSPITAL OUTPATIENT SURGERY
Discharge: HOME | End: 2022-06-03
Attending: PLASTIC SURGERY | Admitting: PLASTIC SURGERY

## 2022-06-03 ENCOUNTER — ANESTHESIA (OUTPATIENT)
Dept: PERIOP | Facility: HOSPITAL | Age: 57
End: 2022-06-03

## 2022-06-03 ENCOUNTER — ANESTHESIA EVENT (OUTPATIENT)
Dept: PERIOP | Facility: HOSPITAL | Age: 57
End: 2022-06-03

## 2022-06-03 VITALS
BODY MASS INDEX: 25.76 KG/M2 | WEIGHT: 184 LBS | OXYGEN SATURATION: 97 % | HEIGHT: 71 IN | SYSTOLIC BLOOD PRESSURE: 138 MMHG | DIASTOLIC BLOOD PRESSURE: 88 MMHG | RESPIRATION RATE: 18 BRPM | TEMPERATURE: 97.5 F | HEART RATE: 64 BPM

## 2022-06-03 DIAGNOSIS — L08.9 INFECTED CYST OF SKIN: ICD-10-CM

## 2022-06-03 DIAGNOSIS — L72.9 INFECTED CYST OF SKIN: ICD-10-CM

## 2022-06-03 PROCEDURE — 25010000002 ONDANSETRON PER 1 MG: Performed by: NURSE ANESTHETIST, CERTIFIED REGISTERED

## 2022-06-03 PROCEDURE — 25010000002 CEFAZOLIN IN DEXTROSE 2-4 GM/100ML-% SOLUTION: Performed by: PLASTIC SURGERY

## 2022-06-03 PROCEDURE — 25010000002 DEXAMETHASONE PER 1 MG: Performed by: NURSE ANESTHETIST, CERTIFIED REGISTERED

## 2022-06-03 PROCEDURE — 87102 FUNGUS ISOLATION CULTURE: CPT | Performed by: PLASTIC SURGERY

## 2022-06-03 PROCEDURE — 25010000002 NEOSTIGMINE 5 MG/10ML SOLUTION: Performed by: NURSE ANESTHETIST, CERTIFIED REGISTERED

## 2022-06-03 PROCEDURE — 87075 CULTR BACTERIA EXCEPT BLOOD: CPT | Performed by: PLASTIC SURGERY

## 2022-06-03 PROCEDURE — 87070 CULTURE OTHR SPECIMN AEROBIC: CPT | Performed by: PLASTIC SURGERY

## 2022-06-03 PROCEDURE — 87186 SC STD MICRODIL/AGAR DIL: CPT | Performed by: PLASTIC SURGERY

## 2022-06-03 PROCEDURE — 25010000002 FENTANYL CITRATE (PF) 50 MCG/ML SOLUTION: Performed by: NURSE ANESTHETIST, CERTIFIED REGISTERED

## 2022-06-03 PROCEDURE — 87205 SMEAR GRAM STAIN: CPT | Performed by: PLASTIC SURGERY

## 2022-06-03 PROCEDURE — 87147 CULTURE TYPE IMMUNOLOGIC: CPT | Performed by: PLASTIC SURGERY

## 2022-06-03 PROCEDURE — 87076 CULTURE ANAEROBE IDENT EACH: CPT | Performed by: PLASTIC SURGERY

## 2022-06-03 PROCEDURE — 25010000002 PROPOFOL 10 MG/ML EMULSION: Performed by: NURSE ANESTHETIST, CERTIFIED REGISTERED

## 2022-06-03 RX ORDER — BUPIVACAINE HYDROCHLORIDE AND EPINEPHRINE 5; 5 MG/ML; UG/ML
INJECTION, SOLUTION EPIDURAL; INTRACAUDAL; PERINEURAL AS NEEDED
Status: DISCONTINUED | OUTPATIENT
Start: 2022-06-03 | End: 2022-06-03 | Stop reason: HOSPADM

## 2022-06-03 RX ORDER — ONDANSETRON 2 MG/ML
4 INJECTION INTRAMUSCULAR; INTRAVENOUS ONCE AS NEEDED
Status: DISCONTINUED | OUTPATIENT
Start: 2022-06-03 | End: 2022-06-03 | Stop reason: HOSPADM

## 2022-06-03 RX ORDER — PROMETHAZINE HYDROCHLORIDE 25 MG/1
25 TABLET ORAL ONCE AS NEEDED
Status: DISCONTINUED | OUTPATIENT
Start: 2022-06-03 | End: 2022-06-03 | Stop reason: HOSPADM

## 2022-06-03 RX ORDER — OXYCODONE AND ACETAMINOPHEN 7.5; 325 MG/1; MG/1
TABLET ORAL
COMMUNITY
Start: 2022-01-20 | End: 2022-06-03 | Stop reason: HOSPADM

## 2022-06-03 RX ORDER — DIPHENHYDRAMINE HCL 25 MG
25 CAPSULE ORAL
Status: DISCONTINUED | OUTPATIENT
Start: 2022-06-03 | End: 2022-06-03 | Stop reason: HOSPADM

## 2022-06-03 RX ORDER — NEOSTIGMINE METHYLSULFATE 0.5 MG/ML
INJECTION, SOLUTION INTRAVENOUS AS NEEDED
Status: DISCONTINUED | OUTPATIENT
Start: 2022-06-03 | End: 2022-06-03 | Stop reason: SURG

## 2022-06-03 RX ORDER — DEXAMETHASONE SODIUM PHOSPHATE 10 MG/ML
INJECTION INTRAMUSCULAR; INTRAVENOUS AS NEEDED
Status: DISCONTINUED | OUTPATIENT
Start: 2022-06-03 | End: 2022-06-03 | Stop reason: SURG

## 2022-06-03 RX ORDER — CEFAZOLIN SODIUM 2 G/100ML
2 INJECTION, SOLUTION INTRAVENOUS ONCE
Status: COMPLETED | OUTPATIENT
Start: 2022-06-03 | End: 2022-06-03

## 2022-06-03 RX ORDER — VITAMIN E (DL,TOCOPHERYL ACET) 180 MG
5 CAPSULE ORAL
COMMUNITY
Start: 2021-12-29

## 2022-06-03 RX ORDER — GLYCOPYRROLATE 0.2 MG/ML
INJECTION INTRAMUSCULAR; INTRAVENOUS AS NEEDED
Status: DISCONTINUED | OUTPATIENT
Start: 2022-06-03 | End: 2022-06-03 | Stop reason: SURG

## 2022-06-03 RX ORDER — FAMOTIDINE 10 MG/ML
20 INJECTION, SOLUTION INTRAVENOUS ONCE
Status: COMPLETED | OUTPATIENT
Start: 2022-06-03 | End: 2022-06-03

## 2022-06-03 RX ORDER — FLUMAZENIL 0.1 MG/ML
0.2 INJECTION INTRAVENOUS AS NEEDED
Status: DISCONTINUED | OUTPATIENT
Start: 2022-06-03 | End: 2022-06-03 | Stop reason: HOSPADM

## 2022-06-03 RX ORDER — PROPOFOL 10 MG/ML
VIAL (ML) INTRAVENOUS AS NEEDED
Status: DISCONTINUED | OUTPATIENT
Start: 2022-06-03 | End: 2022-06-03 | Stop reason: SURG

## 2022-06-03 RX ORDER — HYDROCODONE BITARTRATE AND ACETAMINOPHEN 7.5; 325 MG/1; MG/1
1 TABLET ORAL ONCE AS NEEDED
Status: DISCONTINUED | OUTPATIENT
Start: 2022-06-03 | End: 2022-06-03 | Stop reason: HOSPADM

## 2022-06-03 RX ORDER — LABETALOL HYDROCHLORIDE 5 MG/ML
5 INJECTION, SOLUTION INTRAVENOUS
Status: DISCONTINUED | OUTPATIENT
Start: 2022-06-03 | End: 2022-06-03 | Stop reason: HOSPADM

## 2022-06-03 RX ORDER — DIPHENHYDRAMINE HYDROCHLORIDE 50 MG/ML
12.5 INJECTION INTRAMUSCULAR; INTRAVENOUS
Status: DISCONTINUED | OUTPATIENT
Start: 2022-06-03 | End: 2022-06-03 | Stop reason: HOSPADM

## 2022-06-03 RX ORDER — NALOXONE HCL 0.4 MG/ML
0.2 VIAL (ML) INJECTION AS NEEDED
Status: DISCONTINUED | OUTPATIENT
Start: 2022-06-03 | End: 2022-06-03 | Stop reason: HOSPADM

## 2022-06-03 RX ORDER — ROCURONIUM BROMIDE 10 MG/ML
INJECTION, SOLUTION INTRAVENOUS AS NEEDED
Status: DISCONTINUED | OUTPATIENT
Start: 2022-06-03 | End: 2022-06-03 | Stop reason: SURG

## 2022-06-03 RX ORDER — OXYCODONE AND ACETAMINOPHEN 7.5; 325 MG/1; MG/1
1 TABLET ORAL EVERY 4 HOURS PRN
Status: DISCONTINUED | OUTPATIENT
Start: 2022-06-03 | End: 2022-06-03 | Stop reason: HOSPADM

## 2022-06-03 RX ORDER — CHLORAL HYDRATE 500 MG
1 CAPSULE ORAL DAILY
Status: ON HOLD | COMMUNITY
End: 2022-06-03

## 2022-06-03 RX ORDER — EPHEDRINE SULFATE 50 MG/ML
5 INJECTION, SOLUTION INTRAVENOUS ONCE AS NEEDED
Status: DISCONTINUED | OUTPATIENT
Start: 2022-06-03 | End: 2022-06-03 | Stop reason: HOSPADM

## 2022-06-03 RX ORDER — HYDRALAZINE HYDROCHLORIDE 20 MG/ML
5 INJECTION INTRAMUSCULAR; INTRAVENOUS
Status: DISCONTINUED | OUTPATIENT
Start: 2022-06-03 | End: 2022-06-03 | Stop reason: HOSPADM

## 2022-06-03 RX ORDER — ONDANSETRON 2 MG/ML
INJECTION INTRAMUSCULAR; INTRAVENOUS AS NEEDED
Status: DISCONTINUED | OUTPATIENT
Start: 2022-06-03 | End: 2022-06-03 | Stop reason: SURG

## 2022-06-03 RX ORDER — LIDOCAINE HYDROCHLORIDE 20 MG/ML
INJECTION, SOLUTION INFILTRATION; PERINEURAL AS NEEDED
Status: DISCONTINUED | OUTPATIENT
Start: 2022-06-03 | End: 2022-06-03 | Stop reason: SURG

## 2022-06-03 RX ORDER — SODIUM CHLORIDE, SODIUM LACTATE, POTASSIUM CHLORIDE, CALCIUM CHLORIDE 600; 310; 30; 20 MG/100ML; MG/100ML; MG/100ML; MG/100ML
9 INJECTION, SOLUTION INTRAVENOUS CONTINUOUS
Status: DISCONTINUED | OUTPATIENT
Start: 2022-06-03 | End: 2022-06-03 | Stop reason: HOSPADM

## 2022-06-03 RX ORDER — OXYCODONE HYDROCHLORIDE AND ACETAMINOPHEN 5; 325 MG/1; MG/1
1 TABLET ORAL EVERY 6 HOURS PRN
Qty: 12 TABLET | Refills: 0 | Status: SHIPPED | OUTPATIENT
Start: 2022-06-03 | End: 2022-06-06

## 2022-06-03 RX ORDER — IBUPROFEN 600 MG/1
600 TABLET ORAL ONCE AS NEEDED
Status: DISCONTINUED | OUTPATIENT
Start: 2022-06-03 | End: 2022-06-03 | Stop reason: HOSPADM

## 2022-06-03 RX ORDER — FENTANYL CITRATE 50 UG/ML
INJECTION, SOLUTION INTRAMUSCULAR; INTRAVENOUS AS NEEDED
Status: DISCONTINUED | OUTPATIENT
Start: 2022-06-03 | End: 2022-06-03 | Stop reason: SURG

## 2022-06-03 RX ORDER — PYRIDOXINE HCL (VITAMIN B6) 100 MG
18 TABLET ORAL
COMMUNITY

## 2022-06-03 RX ORDER — MINOXIDIL 5 %
0.05 SOLUTION, NON-ORAL TOPICAL
COMMUNITY
Start: 2021-12-29

## 2022-06-03 RX ORDER — SODIUM CHLORIDE 0.9 % (FLUSH) 0.9 %
10 SYRINGE (ML) INJECTION AS NEEDED
Status: DISCONTINUED | OUTPATIENT
Start: 2022-06-03 | End: 2022-06-03 | Stop reason: HOSPADM

## 2022-06-03 RX ORDER — FINASTERIDE 5 MG/1
TABLET, FILM COATED ORAL
Status: ON HOLD | COMMUNITY
End: 2022-06-03

## 2022-06-03 RX ORDER — MIDAZOLAM HYDROCHLORIDE 1 MG/ML
1 INJECTION INTRAMUSCULAR; INTRAVENOUS
Status: DISCONTINUED | OUTPATIENT
Start: 2022-06-03 | End: 2022-06-03 | Stop reason: HOSPADM

## 2022-06-03 RX ORDER — PROMETHAZINE HYDROCHLORIDE 25 MG/1
25 SUPPOSITORY RECTAL ONCE AS NEEDED
Status: DISCONTINUED | OUTPATIENT
Start: 2022-06-03 | End: 2022-06-03 | Stop reason: HOSPADM

## 2022-06-03 RX ORDER — SODIUM CHLORIDE 0.9 % (FLUSH) 0.9 %
10 SYRINGE (ML) INJECTION EVERY 12 HOURS SCHEDULED
Status: DISCONTINUED | OUTPATIENT
Start: 2022-06-03 | End: 2022-06-03 | Stop reason: HOSPADM

## 2022-06-03 RX ORDER — HYDROMORPHONE HYDROCHLORIDE 1 MG/ML
0.5 INJECTION, SOLUTION INTRAMUSCULAR; INTRAVENOUS; SUBCUTANEOUS
Status: DISCONTINUED | OUTPATIENT
Start: 2022-06-03 | End: 2022-06-03 | Stop reason: HOSPADM

## 2022-06-03 RX ORDER — FENTANYL CITRATE 50 UG/ML
50 INJECTION, SOLUTION INTRAMUSCULAR; INTRAVENOUS
Status: DISCONTINUED | OUTPATIENT
Start: 2022-06-03 | End: 2022-06-03 | Stop reason: HOSPADM

## 2022-06-03 RX ADMIN — ROCURONIUM BROMIDE 30 MG: 50 INJECTION INTRAVENOUS at 14:31

## 2022-06-03 RX ADMIN — CEFAZOLIN SODIUM 2 G: 2 INJECTION, SOLUTION INTRAVENOUS at 14:22

## 2022-06-03 RX ADMIN — GLYCOPYRROLATE 0.4 MG: 0.2 INJECTION INTRAMUSCULAR; INTRAVENOUS at 14:51

## 2022-06-03 RX ADMIN — GLYCOPYRROLATE 0.2 MG: 0.2 INJECTION INTRAMUSCULAR; INTRAVENOUS at 14:31

## 2022-06-03 RX ADMIN — DEXAMETHASONE SODIUM PHOSPHATE 10 MG: 10 INJECTION INTRAMUSCULAR; INTRAVENOUS at 14:39

## 2022-06-03 RX ADMIN — LIDOCAINE HYDROCHLORIDE 80 MG: 20 INJECTION, SOLUTION INFILTRATION; PERINEURAL at 14:31

## 2022-06-03 RX ADMIN — PROPOFOL 200 MG: 10 INJECTION, EMULSION INTRAVENOUS at 14:31

## 2022-06-03 RX ADMIN — NEOSTIGMINE METHYLSULFATE 3 MG: 0.5 INJECTION INTRAVENOUS at 14:51

## 2022-06-03 RX ADMIN — ONDANSETRON 4 MG: 2 INJECTION INTRAMUSCULAR; INTRAVENOUS at 14:51

## 2022-06-03 RX ADMIN — SODIUM CHLORIDE, POTASSIUM CHLORIDE, SODIUM LACTATE AND CALCIUM CHLORIDE 9 ML/HR: 600; 310; 30; 20 INJECTION, SOLUTION INTRAVENOUS at 12:04

## 2022-06-03 RX ADMIN — FAMOTIDINE 20 MG: 10 INJECTION, SOLUTION INTRAVENOUS at 12:19

## 2022-06-03 RX ADMIN — FENTANYL CITRATE 50 MCG: 50 INJECTION INTRAMUSCULAR; INTRAVENOUS at 14:31

## 2022-06-03 RX ADMIN — SUGAMMADEX 200 MG: 100 INJECTION, SOLUTION INTRAVENOUS at 14:55

## 2022-06-03 RX ADMIN — FENTANYL CITRATE 50 MCG: 50 INJECTION INTRAMUSCULAR; INTRAVENOUS at 15:02

## 2022-06-03 NOTE — ANESTHESIA PROCEDURE NOTES
Airway  Urgency: elective    Date/Time: 6/3/2022 2:35 PM  Airway not difficult    General Information and Staff    Patient location during procedure: OR  Anesthesiologist: Aleksey Bailey MD  CRNA/CAA: [place] De Luna CRNA    Indications and Patient Condition  Indications for airway management: airway protection    Preoxygenated: yes  Mask difficulty assessment: 1 - vent by mask    Final Airway Details  Final airway type: endotracheal airway      Successful airway: ETT  Cuffed: yes   Successful intubation technique: direct laryngoscopy  Facilitating devices/methods: intubating stylet  Endotracheal tube insertion site: oral  Blade: Cisco  Blade size: 3  ETT size (mm): 7.5  Cormack-Lehane Classification: grade I - full view of glottis  Placement verified by: chest auscultation and capnometry   Cuff volume (mL): 7  Measured from: lips  ETT/EBT  to lips (cm): 21  Number of attempts at approach: 1  Assessment: lips, teeth, and gum same as pre-op and atraumatic intubation

## 2022-06-03 NOTE — ANESTHESIA PREPROCEDURE EVALUATION
Anesthesia Evaluation     Patient summary reviewed and Nursing notes reviewed   no history of anesthetic complications:  NPO Solid Status: > 6 hours  NPO Liquid Status: > 6 hours           Airway   Mallampati: II  TM distance: >3 FB  Neck ROM: full  no difficulty expected and No difficulty expected  Dental - normal exam   (+) implants, upper dentures and lower dentures    Pulmonary - normal exam    breath sounds clear to auscultation  (+) pulmonary embolism, shortness of breath,   (-) rhonchi, decreased breath sounds, wheezes, rales, stridor  Cardiovascular - normal exam    NYHA Classification: I  ECG reviewed  Rhythm: regular  Rate: normal    (+) dysrhythmias Atrial Fib,   (-) murmur, weak pulses, friction rub, systolic click, carotid bruits, JVD, peripheral edema      Neuro/Psych- negative ROS  GI/Hepatic/Renal/Endo    (+)  hiatal hernia, GERD,      Musculoskeletal (-) negative ROS    Abdominal  - normal exam    Abdomen: soft.   Substance History - negative use     OB/GYN negative ob/gyn ROS         Other - negative ROS                       Anesthesia Plan    ASA 2     general     intravenous induction     Anesthetic plan, all risks, benefits, and alternatives have been provided, discussed and informed consent has been obtained with: patient.        CODE STATUS:

## 2022-06-03 NOTE — ANESTHESIA POSTPROCEDURE EVALUATION
"Patient: Oliverio Corrales    Procedure Summary     Date: 06/03/22 Room / Location: [facility] OR  / [facility] MAIN OR    Anesthesia Start: 1425 Anesthesia Stop: 1507    Procedure: INCISION AND DRAINAGE INFECTED CYST OF CHEST (N/A Abdomen) Diagnosis:     Surgeons: Roberto Connelly MD Provider: Aleksey Bailey MD    Anesthesia Type: general ASA Status: 2          Anesthesia Type: general    Vitals  Vitals Value Taken Time   /93 06/03/22 1546   Temp 36.4 °C (97.5 °F) 06/03/22 1504   Pulse 50 06/03/22 1556   Resp 16 06/03/22 1545   SpO2 100 % 06/03/22 1556   Vitals shown include unvalidated device data.        Post Anesthesia Care and Evaluation    Patient location during evaluation: bedside  Patient participation: complete - patient participated  Level of consciousness: awake  Pain score: 2  Pain management: adequate  Airway patency: patent  Anesthetic complications: No anesthetic complications  PONV Status: none  Cardiovascular status: acceptable  Respiratory status: acceptable  Hydration status: acceptable    Comments: /93 (BP Location: Right arm, Patient Position: Lying)   Pulse 57   Temp 36.4 °C (97.5 °F) (Oral)   Resp 16   Ht 180.3 cm (71\")   Wt 83.5 kg (184 lb)   SpO2 100%   BMI 25.66 kg/m²         "

## 2022-06-03 NOTE — OP NOTE
Preop diagnosis: Infected chest cyst  Postop diagnosis: Same  Procedure: Ciszek incision drainage and debridement infected cyst of the chest  Surgeon: Roberto Connelly  Anesthesia: Enteral +6 cc of half percent Marcaine with epinephrine local infiltration  History this 56-year-old male presented several weeks ago with what appeared to be a sebaceous cyst of the mid upper chest.  He subsequently returned several days ago and the area appeared infected and draining.  He is now brought to the operating room for drainage and debridement which was performed in the following fashion  Procedure: After SCDs were placed and the patient received prophylactic antibiotics he was brought back to the operating room and general anesthesia was achieved.  The anticipated incisions and planes of dissection were infiltrated with half percent Marcaine with epinephrine 6 cc total.  After preparation and draping and waiting for the anesthetic and epinephrine effect the specimens were first sent for culture anaerobic aerobic Mycobacterium and fungus.  Purulence was expressed through the wound from the wound to the degree possible and the inflamed overlying send out skin adjacent to the draining wound was debrided.  At the conclusion the wound was irrigated with Betadine solution iodoform packing was placed and the patient tolerated the procedure well left the procedure room in satisfactory condition end of dictation

## 2022-06-03 NOTE — BRIEF OP NOTE
TRUNK LESION/CYST EXCISION  Progress Note    Oliverio Corrales  6/3/2022    Pre-op Diagnosis:   Infected chest  cyst        Post-Op Diagnosis Codes:   same    Procedure/CPT® Codes:        Procedure(s):  INCISION AND DRAINAGE INFECTED CYST OF CHEST    Surgeon(s):  Roberto Connelly MD    Anesthesia: General    Staff:   Circulator: Maren Johnson RN; Rosalba Buenrostro RN  Scrub Person: Ethel Kilgore; Thu Nunez         Estimated Blood Loss: none    Urine Voided: * No values recorded between 6/3/2022  2:25 PM and 6/3/2022  2:52 PM *    Specimens:                Specimens     ID Source Type Tests Collected By Collected At Frozen?    1 Chest, Middle Wound · ANAEROBIC CULTURE  · FUNGAL CULTURE  · WOUND CULTURE  · AFB CULTURE   Roberto Connelly MD 6/3/22 0660     Description: aerobic, anerobic, fungus, mycobacterium cultures                Drains: * No LDAs found *    Findings: infected cyst        Complications: none          Roberto Connelly MD     Date: 6/3/2022  Time: 15:01 EDT

## 2022-06-03 NOTE — PERIOPERATIVE NURSING NOTE
Called and spoke with Dr. Connelly about bottle of iodoform gauze that patient came from PACU with and wound care instructions. Dr. Connelly said to send patient home with the bottle and that the patient has his cell phone number and to remind the patient to call him this evening and he will go over instructions.

## 2022-06-06 LAB
BACTERIA SPEC AEROBE CULT: ABNORMAL
BACTERIA SPEC AEROBE CULT: ABNORMAL
GRAM STN SPEC: ABNORMAL
GRAM STN SPEC: ABNORMAL

## 2022-06-08 LAB — BACTERIA SPEC ANAEROBE CULT: ABNORMAL

## 2022-06-13 ENCOUNTER — APPOINTMENT (OUTPATIENT)
Dept: PREADMISSION TESTING | Facility: HOSPITAL | Age: 57
End: 2022-06-13

## 2022-07-01 LAB — FUNGUS WND CULT: NORMAL

## 2022-12-29 ENCOUNTER — HOSPITAL ENCOUNTER (EMERGENCY)
Facility: HOSPITAL | Age: 57
Discharge: HOME | End: 2022-12-29
Attending: EMERGENCY MEDICINE | Admitting: EMERGENCY MEDICINE
Payer: MEDICAID

## 2022-12-29 ENCOUNTER — APPOINTMENT (OUTPATIENT)
Dept: CT IMAGING | Facility: HOSPITAL | Age: 57
End: 2022-12-29
Payer: MEDICAID

## 2022-12-29 ENCOUNTER — APPOINTMENT (OUTPATIENT)
Dept: GENERAL RADIOLOGY | Facility: HOSPITAL | Age: 57
End: 2022-12-29
Payer: MEDICAID

## 2022-12-29 VITALS
RESPIRATION RATE: 18 BRPM | HEIGHT: 71 IN | HEART RATE: 62 BPM | SYSTOLIC BLOOD PRESSURE: 137 MMHG | OXYGEN SATURATION: 96 % | BODY MASS INDEX: 26.6 KG/M2 | WEIGHT: 190 LBS | DIASTOLIC BLOOD PRESSURE: 94 MMHG | TEMPERATURE: 96.8 F

## 2022-12-29 DIAGNOSIS — R07.9 CHEST PAIN IN ADULT: Primary | ICD-10-CM

## 2022-12-29 LAB
ALBUMIN SERPL-MCNC: 4.4 G/DL (ref 3.5–5.2)
ALBUMIN/GLOB SERPL: 1.8 G/DL
ALP SERPL-CCNC: 156 U/L (ref 39–117)
ALT SERPL W P-5'-P-CCNC: 30 U/L (ref 1–41)
ANION GAP SERPL CALCULATED.3IONS-SCNC: 9.7 MMOL/L (ref 5–15)
AST SERPL-CCNC: 23 U/L (ref 1–40)
BASOPHILS # BLD AUTO: 0.07 10*3/MM3 (ref 0–0.2)
BASOPHILS NFR BLD AUTO: 0.9 % (ref 0–1.5)
BILIRUB SERPL-MCNC: 1.2 MG/DL (ref 0–1.2)
BUN SERPL-MCNC: 20 MG/DL (ref 6–20)
BUN/CREAT SERPL: 19 (ref 7–25)
CALCIUM SPEC-SCNC: 9.4 MG/DL (ref 8.6–10.5)
CHLORIDE SERPL-SCNC: 99 MMOL/L (ref 98–107)
CO2 SERPL-SCNC: 28.3 MMOL/L (ref 22–29)
CREAT SERPL-MCNC: 1.05 MG/DL (ref 0.76–1.27)
DEPRECATED RDW RBC AUTO: 41 FL (ref 37–54)
EGFRCR SERPLBLD CKD-EPI 2021: 81.8 ML/MIN/1.73
EOSINOPHIL # BLD AUTO: 0.15 10*3/MM3 (ref 0–0.4)
EOSINOPHIL NFR BLD AUTO: 1.8 % (ref 0.3–6.2)
ERYTHROCYTE [DISTWIDTH] IN BLOOD BY AUTOMATED COUNT: 12.8 % (ref 12.3–15.4)
GLOBULIN UR ELPH-MCNC: 2.4 GM/DL
GLUCOSE SERPL-MCNC: 105 MG/DL (ref 65–99)
HCT VFR BLD AUTO: 48.5 % (ref 37.5–51)
HGB BLD-MCNC: 16.2 G/DL (ref 13–17.7)
HOLD SPECIMEN: NORMAL
HOLD SPECIMEN: NORMAL
IMM GRANULOCYTES # BLD AUTO: 0.07 10*3/MM3 (ref 0–0.05)
IMM GRANULOCYTES NFR BLD AUTO: 0.9 % (ref 0–0.5)
LYMPHOCYTES # BLD AUTO: 2.34 10*3/MM3 (ref 0.7–3.1)
LYMPHOCYTES NFR BLD AUTO: 28.6 % (ref 19.6–45.3)
MCH RBC QN AUTO: 30.1 PG (ref 26.6–33)
MCHC RBC AUTO-ENTMCNC: 33.4 G/DL (ref 31.5–35.7)
MCV RBC AUTO: 90.1 FL (ref 79–97)
MONOCYTES # BLD AUTO: 0.64 10*3/MM3 (ref 0.1–0.9)
MONOCYTES NFR BLD AUTO: 7.8 % (ref 5–12)
NEUTROPHILS NFR BLD AUTO: 4.91 10*3/MM3 (ref 1.7–7)
NEUTROPHILS NFR BLD AUTO: 60 % (ref 42.7–76)
NRBC BLD AUTO-RTO: 0 /100 WBC (ref 0–0.2)
PLATELET # BLD AUTO: 207 10*3/MM3 (ref 140–450)
PMV BLD AUTO: 9.9 FL (ref 6–12)
POTASSIUM SERPL-SCNC: 3.7 MMOL/L (ref 3.5–5.2)
PROT SERPL-MCNC: 6.8 G/DL (ref 6–8.5)
QT INTERVAL: 376 MS
RBC # BLD AUTO: 5.38 10*6/MM3 (ref 4.14–5.8)
SODIUM SERPL-SCNC: 137 MMOL/L (ref 136–145)
TROPONIN T SERPL-MCNC: <0.01 NG/ML (ref 0–0.03)
TROPONIN T SERPL-MCNC: <0.01 NG/ML (ref 0–0.03)
WBC NRBC COR # BLD: 8.18 10*3/MM3 (ref 3.4–10.8)
WHOLE BLOOD HOLD COAG: NORMAL
WHOLE BLOOD HOLD SPECIMEN: NORMAL

## 2022-12-29 PROCEDURE — 99284 EMERGENCY DEPT VISIT MOD MDM: CPT

## 2022-12-29 PROCEDURE — 84484 ASSAY OF TROPONIN QUANT: CPT

## 2022-12-29 PROCEDURE — 0 IOPAMIDOL PER 1 ML: Performed by: EMERGENCY MEDICINE

## 2022-12-29 PROCEDURE — 93005 ELECTROCARDIOGRAM TRACING: CPT | Performed by: EMERGENCY MEDICINE

## 2022-12-29 PROCEDURE — 71046 X-RAY EXAM CHEST 2 VIEWS: CPT

## 2022-12-29 PROCEDURE — 93010 ELECTROCARDIOGRAM REPORT: CPT | Performed by: INTERNAL MEDICINE

## 2022-12-29 PROCEDURE — 85025 COMPLETE CBC W/AUTO DIFF WBC: CPT

## 2022-12-29 PROCEDURE — 36415 COLL VENOUS BLD VENIPUNCTURE: CPT

## 2022-12-29 PROCEDURE — 93005 ELECTROCARDIOGRAM TRACING: CPT

## 2022-12-29 PROCEDURE — 71275 CT ANGIOGRAPHY CHEST: CPT

## 2022-12-29 PROCEDURE — 80053 COMPREHEN METABOLIC PANEL: CPT

## 2022-12-29 PROCEDURE — 84484 ASSAY OF TROPONIN QUANT: CPT | Performed by: EMERGENCY MEDICINE

## 2022-12-29 RX ORDER — ASPIRIN 325 MG
325 TABLET ORAL ONCE
Status: COMPLETED | OUTPATIENT
Start: 2022-12-29 | End: 2022-12-29

## 2022-12-29 RX ORDER — SODIUM CHLORIDE 0.9 % (FLUSH) 0.9 %
10 SYRINGE (ML) INJECTION AS NEEDED
Status: DISCONTINUED | OUTPATIENT
Start: 2022-12-29 | End: 2022-12-30 | Stop reason: HOSPADM

## 2022-12-29 RX ADMIN — IOPAMIDOL 95 ML: 755 INJECTION, SOLUTION INTRAVENOUS at 21:16

## 2022-12-29 RX ADMIN — ASPIRIN 325 MG: 325 TABLET ORAL at 19:55

## 2022-12-30 NOTE — ED PROVIDER NOTES
EMERGENCY DEPARTMENT ENCOUNTER    Room Number:  29/29  Date seen:  12/31/2022  PCP: Raeann Becerra MD  Historian: Patient  Relevant information and history provided by sources other than the patient will be included in the context section.  Review of pertinent past medical records may also be included in the context section rather than MDM to avoid unnecessary redundancy.    HPI:  Chief Complaint: Chest pain  A complete HPI/ROS/PMH/PSH/SH/FH are unobtainable due to: Nothing  Context: Oliverio Corrales is a 57 y.o. male who presents to the ED c/o chest pain for the last 3 days since he was working outside in the cold.  Patient said he thinks he was working outside in the cold and that \"the cold must of burned my lungs\".  He said that since then he has had a burning sensation in the substernal area which is worse when he takes a deep breath but not exertional.  It does not radiate, there is no nausea or vomiting or diaphoresis, the patient has no history of heart diseas.  He does have a history of PE and is no longer on blood thinners    Patient has not taken anything for it, has not seen another physician for it and has mild symptoms current        REVIEW OF SYSTEMS  Review of Systems   All negative except above      PAST MEDICAL HISTORY  Active Ambulatory Problems     Diagnosis Date Noted   • Bilateral pulmonary embolism (HCC) 01/02/2017   • HH (hiatus hernia) 10/30/2018   • Hiatal hernia with gastroesophageal reflux disease and esophagitis 01/03/2019   • Tachycardia 05/22/2017   • Pleuritic chest pain 05/22/2017   • Intractable chronic migraine without aura and without status migrainosus 09/08/2016   • Hx pulmonary embolism 05/22/2017   • Dyspnea 10/08/2018   • Complex tear of medial meniscus of left knee as current injury 09/18/2020   • Chronic neck pain 08/31/2015   • Biceps tendinosis of right shoulder 08/27/2020   • ASHD (arteriosclerotic heart disease) 10/08/2018   • Afib (HCC) 10/08/2018   • Acquired  trigger finger of left middle finger 01/14/2020     Resolved Ambulatory Problems     Diagnosis Date Noted   • No Resolved Ambulatory Problems     Past Medical History:   Diagnosis Date   • GERD (gastroesophageal reflux disease)    • Pulmonary embolism (HCC)    • Reflux esophagitis          PAST SURGICAL HISTORY  Past Surgical History:   Procedure Laterality Date   • CHOLECYSTECTOMY     • COLONOSCOPY     • ENDOSCOPY     • FACELIFT N/A 1/29/2021    Procedure: FULL FACE LIFT WITH  FAT TRANSFER;  Surgeon: Roberto Connelly MD;  Location: [facility];  Service: New Image;  Laterality: N/A;   • INGUINAL HERNIA REPAIR     • NISSEN FUNDOPLICATION N/A 1/3/2019    Procedure: REDO LAPAROSCOPIC NISSEN FUNDOPLICATION  WITH DAVINCI ROBOT AND MESH;  Surgeon: Qi Beltre MD;  Location: [facility] OR;  Service: DaVinci   • SHOULDER SURGERY Left    • TONSILLECTOMY     • TRUNK LESION/CYST EXCISION N/A 6/3/2022    Procedure: INCISION AND DRAINAGE INFECTED CYST OF CHEST;  Surgeon: Roberto Connelly MD;  Location: [facility];  Service: Plastics;  Laterality: N/A;         FAMILY HISTORY  Family History   Problem Relation Age of Onset   • Lung disease Father    • Malig Hyperthermia Neg Hx          SOCIAL HISTORY  Social History     Socioeconomic History   • Marital status: Single   Tobacco Use   • Smoking status: Never   • Smokeless tobacco: Never   Substance and Sexual Activity   • Alcohol use: No   • Drug use: No   • Sexual activity: Not Currently     Birth control/protection: None         ALLERGIES  Erythromycin, Hydrocortisone, Cortisone, Amoxicillin, and Sulfa antibiotics          PHYSICAL EXAM  ED Triage Vitals   Temp Heart Rate Resp BP SpO2   12/29/22 1629 12/29/22 1629 12/29/22 1629 12/29/22 1718 12/29/22 1629   96.8 °F (36 °C) 85 18 (!) 129/106 96 %      Temp src Heart Rate Source Patient Position BP Location FiO2 (%)   12/29/22 1629 12/29/22 1629 12/29/22 1718 12/29/22 1718 --   Tympanic Monitor Sitting Left arm         Physical Exam      GENERAL: no acute distress  HENT: nares patent  EYES: no scleral icterus, PERRL, EOMI  CV: regular rhythm, normal rate, distal pulses symmetric and palpable  RESPIRATORY: normal effort  ABDOMEN: soft, nondistended nontender with normal bowel sound  MUSCULOSKELETAL: no deformity  NEURO: alert, moves all extremities, follows commands  PSYCH:  calm, cooperative  SKIN: warm, dry with no rash    Vital signs and nursing notes reviewed.          LAB RESULTS  No results found for this or any previous visit (from the past 24 hour(s)).    Ordered the above labs and reviewed the results.        RADIOLOGY  No Radiology Exams Resulted Within Past 24 Hours    Ordered the above noted radiological studies. Reviewed by me in PACS.            PROCEDURES  Procedures              MEDICATIONS GIVEN IN ER  Medications   aspirin tablet 325 mg (325 mg Oral Given 12/29/22 1955)   iopamidol (ISOVUE-370) 76 % injection 95 mL (95 mL Intravenous Given 12/29/22 2116)                   MEDICAL DECISION MAKING, PROGRESS, and CONSULTS    All labs have been independently reviewed by me.    All radiology studies have been independently reviewed by me and discussed with radiologist dictating the report.    EKG's are independently viewed and interpreted by me.      The discussion below represents my analysis of pertinent findings related to patient's current condition, review of past medical history and available medical records, differential diagnosis, and discussion with consultants regarding this encounter.          ED Course as of 12/31/22 2348   Sat Dec 31, 2022   2347 CBC and chemistry unremarkable, troponin normal [DP]   2347 Chest x-ray negative [DP]   2347 CT angiogram negative [DP]   2347 EKG at 2125  Sinus rhythm at 70  Normal ID, QRS and QT  No acute ST segment changes seen to suggest ischemia, and there is no change compared to previous from April 7, 2019 [DP]   2348 Patient has heart score of 3 with very atypical  type pain.  He has been ruled out for VTE, no infectious etiology, no congestive failure, no hypoxia or respiratory distress.  Patient safe for discharge home and outpatient follow-up [DP]      ED Course User Index  [DP] Sarabjit Camacho MD              All appropriate hygiene and PPE requirements were satisfied with this patient encounter    FINAL DIAGNOSES  Final diagnoses:   Chest pain in adult         DISPOSITION  Discharge            Latest Documented Vital Signs:  As of 23:48 EST  BP- 137/94 HR- 62 Temp- 96.8 °F (36 °C) (Tympanic) O2 sat- 96%        --    Please note that portions of this were completed with a voice recognition program.       Note Disclaimer: At [facility], we believe that sharing information builds trust and better relationships. You are receiving this note because you are receiving care at [facility] or recently visited. It is possible you will see health information before a provider has talked with you about it. This kind of information can be easy to misunderstand. To help you fully understand what it      Sarabjit Camacho MD  12/31/22 1021     Opt out

## 2023-04-23 ENCOUNTER — HOSPITAL ENCOUNTER (EMERGENCY)
Facility: HOSPITAL | Age: 58
Discharge: HOME | End: 2023-04-24
Attending: EMERGENCY MEDICINE | Admitting: EMERGENCY MEDICINE
Payer: MEDICAID

## 2023-04-23 ENCOUNTER — APPOINTMENT (OUTPATIENT)
Dept: CT IMAGING | Facility: HOSPITAL | Age: 58
End: 2023-04-23
Payer: MEDICAID

## 2023-04-23 DIAGNOSIS — R06.00 DYSPNEA, UNSPECIFIED TYPE: Primary | ICD-10-CM

## 2023-04-23 DIAGNOSIS — M54.6 ACUTE LEFT-SIDED THORACIC BACK PAIN: ICD-10-CM

## 2023-04-23 LAB
ALBUMIN SERPL-MCNC: 4.3 G/DL (ref 3.5–5.2)
ALBUMIN/GLOB SERPL: 2 G/DL
ALP SERPL-CCNC: 117 U/L (ref 39–117)
ALT SERPL W P-5'-P-CCNC: 24 U/L (ref 1–41)
ANION GAP SERPL CALCULATED.3IONS-SCNC: 10.9 MMOL/L (ref 5–15)
AST SERPL-CCNC: 19 U/L (ref 1–40)
B PARAPERT DNA SPEC QL NAA+PROBE: NOT DETECTED
B PERT DNA SPEC QL NAA+PROBE: NOT DETECTED
BASOPHILS # BLD AUTO: 0.06 10*3/MM3 (ref 0–0.2)
BASOPHILS NFR BLD AUTO: 0.7 % (ref 0–1.5)
BILIRUB SERPL-MCNC: 0.8 MG/DL (ref 0–1.2)
BUN SERPL-MCNC: 16 MG/DL (ref 6–20)
BUN/CREAT SERPL: 16.8 (ref 7–25)
C PNEUM DNA NPH QL NAA+NON-PROBE: NOT DETECTED
CALCIUM SPEC-SCNC: 9.7 MG/DL (ref 8.6–10.5)
CHLORIDE SERPL-SCNC: 104 MMOL/L (ref 98–107)
CO2 SERPL-SCNC: 25.1 MMOL/L (ref 22–29)
CREAT SERPL-MCNC: 0.95 MG/DL (ref 0.76–1.27)
D DIMER PPP FEU-MCNC: <0.27 MCGFEU/ML (ref 0–0.59)
D-LACTATE SERPL-SCNC: 1.9 MMOL/L (ref 0.5–2)
DEPRECATED RDW RBC AUTO: 42.5 FL (ref 37–54)
EGFRCR SERPLBLD CKD-EPI 2021: 92.2 ML/MIN/1.73
EOSINOPHIL # BLD AUTO: 0.19 10*3/MM3 (ref 0–0.4)
EOSINOPHIL NFR BLD AUTO: 2.3 % (ref 0.3–6.2)
ERYTHROCYTE [DISTWIDTH] IN BLOOD BY AUTOMATED COUNT: 12.7 % (ref 12.3–15.4)
FLUAV SUBTYP SPEC NAA+PROBE: NOT DETECTED
FLUBV RNA ISLT QL NAA+PROBE: NOT DETECTED
GLOBULIN UR ELPH-MCNC: 2.1 GM/DL
GLUCOSE SERPL-MCNC: 90 MG/DL (ref 65–99)
HADV DNA SPEC NAA+PROBE: NOT DETECTED
HCOV 229E RNA SPEC QL NAA+PROBE: NOT DETECTED
HCOV HKU1 RNA SPEC QL NAA+PROBE: NOT DETECTED
HCOV NL63 RNA SPEC QL NAA+PROBE: NOT DETECTED
HCOV OC43 RNA SPEC QL NAA+PROBE: NOT DETECTED
HCT VFR BLD AUTO: 45.8 % (ref 37.5–51)
HGB BLD-MCNC: 15.1 G/DL (ref 13–17.7)
HMPV RNA NPH QL NAA+NON-PROBE: NOT DETECTED
HPIV1 RNA ISLT QL NAA+PROBE: NOT DETECTED
HPIV2 RNA SPEC QL NAA+PROBE: NOT DETECTED
HPIV3 RNA NPH QL NAA+PROBE: NOT DETECTED
HPIV4 P GENE NPH QL NAA+PROBE: NOT DETECTED
IMM GRANULOCYTES # BLD AUTO: 0.05 10*3/MM3 (ref 0–0.05)
IMM GRANULOCYTES NFR BLD AUTO: 0.6 % (ref 0–0.5)
INR PPP: 1.11 (ref 0.9–1.1)
LYMPHOCYTES # BLD AUTO: 2.69 10*3/MM3 (ref 0.7–3.1)
LYMPHOCYTES NFR BLD AUTO: 32.7 % (ref 19.6–45.3)
M PNEUMO IGG SER IA-ACNC: NOT DETECTED
MAGNESIUM SERPL-MCNC: 2 MG/DL (ref 1.6–2.6)
MCH RBC QN AUTO: 30.4 PG (ref 26.6–33)
MCHC RBC AUTO-ENTMCNC: 33 G/DL (ref 31.5–35.7)
MCV RBC AUTO: 92.2 FL (ref 79–97)
MONOCYTES # BLD AUTO: 0.66 10*3/MM3 (ref 0.1–0.9)
MONOCYTES NFR BLD AUTO: 8 % (ref 5–12)
NEUTROPHILS NFR BLD AUTO: 4.57 10*3/MM3 (ref 1.7–7)
NEUTROPHILS NFR BLD AUTO: 55.7 % (ref 42.7–76)
NRBC BLD AUTO-RTO: 0 /100 WBC (ref 0–0.2)
NT-PROBNP SERPL-MCNC: 89.5 PG/ML (ref 0–900)
PLATELET # BLD AUTO: 196 10*3/MM3 (ref 140–450)
PMV BLD AUTO: 10 FL (ref 6–12)
POTASSIUM SERPL-SCNC: 3.9 MMOL/L (ref 3.5–5.2)
PROCALCITONIN SERPL-MCNC: 0.04 NG/ML (ref 0–0.25)
PROT SERPL-MCNC: 6.4 G/DL (ref 6–8.5)
PROTHROMBIN TIME: 14.5 SECONDS (ref 11.7–14.2)
RBC # BLD AUTO: 4.97 10*6/MM3 (ref 4.14–5.8)
RHINOVIRUS RNA SPEC NAA+PROBE: NOT DETECTED
RSV RNA NPH QL NAA+NON-PROBE: NOT DETECTED
SARS-COV-2 RNA NPH QL NAA+NON-PROBE: NOT DETECTED
SODIUM SERPL-SCNC: 140 MMOL/L (ref 136–145)
TROPONIN T SERPL HS-MCNC: 11 NG/L
WBC NRBC COR # BLD: 8.22 10*3/MM3 (ref 3.4–10.8)

## 2023-04-23 PROCEDURE — 85379 FIBRIN DEGRADATION QUANT: CPT | Performed by: EMERGENCY MEDICINE

## 2023-04-23 PROCEDURE — 96361 HYDRATE IV INFUSION ADD-ON: CPT

## 2023-04-23 PROCEDURE — 87040 BLOOD CULTURE FOR BACTERIA: CPT | Performed by: EMERGENCY MEDICINE

## 2023-04-23 PROCEDURE — 36415 COLL VENOUS BLD VENIPUNCTURE: CPT

## 2023-04-23 PROCEDURE — 25510000001 IOPAMIDOL PER 1 ML: Performed by: EMERGENCY MEDICINE

## 2023-04-23 PROCEDURE — 83880 ASSAY OF NATRIURETIC PEPTIDE: CPT | Performed by: EMERGENCY MEDICINE

## 2023-04-23 PROCEDURE — 83735 ASSAY OF MAGNESIUM: CPT | Performed by: EMERGENCY MEDICINE

## 2023-04-23 PROCEDURE — 96374 THER/PROPH/DIAG INJ IV PUSH: CPT

## 2023-04-23 PROCEDURE — 85025 COMPLETE CBC W/AUTO DIFF WBC: CPT | Performed by: EMERGENCY MEDICINE

## 2023-04-23 PROCEDURE — 99284 EMERGENCY DEPT VISIT MOD MDM: CPT

## 2023-04-23 PROCEDURE — 93005 ELECTROCARDIOGRAM TRACING: CPT | Performed by: EMERGENCY MEDICINE

## 2023-04-23 PROCEDURE — 80053 COMPREHEN METABOLIC PANEL: CPT | Performed by: EMERGENCY MEDICINE

## 2023-04-23 PROCEDURE — 25010000002 KETOROLAC TROMETHAMINE PER 15 MG: Performed by: EMERGENCY MEDICINE

## 2023-04-23 PROCEDURE — 84484 ASSAY OF TROPONIN QUANT: CPT | Performed by: EMERGENCY MEDICINE

## 2023-04-23 PROCEDURE — 71275 CT ANGIOGRAPHY CHEST: CPT

## 2023-04-23 PROCEDURE — 84145 PROCALCITONIN (PCT): CPT | Performed by: EMERGENCY MEDICINE

## 2023-04-23 PROCEDURE — 0202U NFCT DS 22 TRGT SARS-COV-2: CPT | Performed by: EMERGENCY MEDICINE

## 2023-04-23 PROCEDURE — 83605 ASSAY OF LACTIC ACID: CPT | Performed by: EMERGENCY MEDICINE

## 2023-04-23 PROCEDURE — 85610 PROTHROMBIN TIME: CPT | Performed by: EMERGENCY MEDICINE

## 2023-04-23 RX ORDER — SODIUM CHLORIDE 9 MG/ML
125 INJECTION, SOLUTION INTRAVENOUS CONTINUOUS
Status: DISCONTINUED | OUTPATIENT
Start: 2023-04-23 | End: 2023-04-24 | Stop reason: HOSPADM

## 2023-04-23 RX ORDER — KETOROLAC TROMETHAMINE 15 MG/ML
15 INJECTION, SOLUTION INTRAMUSCULAR; INTRAVENOUS ONCE
Status: COMPLETED | OUTPATIENT
Start: 2023-04-23 | End: 2023-04-23

## 2023-04-23 RX ORDER — SODIUM CHLORIDE 0.9 % (FLUSH) 0.9 %
10 SYRINGE (ML) INJECTION AS NEEDED
Status: DISCONTINUED | OUTPATIENT
Start: 2023-04-23 | End: 2023-04-24 | Stop reason: HOSPADM

## 2023-04-23 RX ADMIN — SODIUM CHLORIDE 500 ML: 9 INJECTION, SOLUTION INTRAVENOUS at 22:56

## 2023-04-23 RX ADMIN — KETOROLAC TROMETHAMINE 15 MG: 15 INJECTION, SOLUTION INTRAMUSCULAR; INTRAVENOUS at 22:56

## 2023-04-23 RX ADMIN — SODIUM CHLORIDE 125 ML/HR: 9 INJECTION, SOLUTION INTRAVENOUS at 23:26

## 2023-04-23 RX ADMIN — IOPAMIDOL 95 ML: 755 INJECTION, SOLUTION INTRAVENOUS at 23:52

## 2023-04-24 VITALS
HEART RATE: 58 BPM | WEIGHT: 186 LBS | OXYGEN SATURATION: 99 % | RESPIRATION RATE: 16 BRPM | HEIGHT: 71 IN | TEMPERATURE: 97.4 F | SYSTOLIC BLOOD PRESSURE: 133 MMHG | BODY MASS INDEX: 26.04 KG/M2 | DIASTOLIC BLOOD PRESSURE: 97 MMHG

## 2023-04-24 LAB
GEN 5 2HR TROPONIN T REFLEX: 12 NG/L
QT INTERVAL: 407 MS
TROPONIN T DELTA: 1 NG/L

## 2023-04-24 PROCEDURE — 36415 COLL VENOUS BLD VENIPUNCTURE: CPT

## 2023-04-24 PROCEDURE — 96361 HYDRATE IV INFUSION ADD-ON: CPT

## 2023-04-24 PROCEDURE — 84484 ASSAY OF TROPONIN QUANT: CPT | Performed by: EMERGENCY MEDICINE

## 2023-04-24 NOTE — ED TRIAGE NOTES
Patient ambulatory to triage c/o difficulty breathing with exertion. Patient states he has been dealing with a cold for about 3 weeks, with congestion and cough. Patient unsure of how long his shortness of breath has been going on. Patient reports being on nasal spray, and inhaler.

## 2023-04-24 NOTE — ED PROVIDER NOTES
" EMERGENCY DEPARTMENT ENCOUNTER    Room Number:  22/22  Date of encounter:  4/24/2023  PCP: Raeann Becerra MD  Historian: Patient  Relevant information and history provided by sources other than the patient will be included below and in the ED Course.  Review of pertinent past medical records may also be included in record below and ED Course.    HPI:  Chief Complaint: \"I think I have pneumonia\" I am short of breath  A complete HPI/ROS/PMH/PSH/SH/FH are unobtainable due to: Not applicable  Context: Oliverio Corrales is a 57 y.o. male who presents to the ED c/o patient states about 3 weeks ago his symptoms started with some shortness of breath as well as a cough.  Cough has been productive of light yellow phlegm.  He does not report any fever.  He has developed some pain in the left thoracic portion of his back a little inferior to his scapula that he cannot describe and states that \"it is just a pain\".  This pain seems to be worse when he takes a deep breath as well as when he coughs.  He has had a history of pleurisy several times before and he thinks that he might be at the beginnings of getting pleurisy.  He denies any anterior chest pain.  He states that shortness of breath is worse when he gets up and moves as well as with coughing.  He is not a smoker.  Denies any nausea or vomiting.  He also had a history of a pulmonary embolism after shoulder surgery in 2017.  Has had no blood clots since that time.  Does not have any pain or swelling in his lower extremities bilaterally.  He has not seen any physician for these current complaints.  He is currently not on any blood thinning medicine.        Previous Episodes: He feels this could be pleurisy or a pneumonia.  Current Symptoms: See above    MEDICAL HISTORY REVIEWED  There is a gentleman that did have a history of atrial fibrillation that resolved history of PE in 2017 after shoulder surgery.  Also has chronic migraines.  I reviewed his medicine " list.      PAST MEDICAL HISTORY  Active Ambulatory Problems     Diagnosis Date Noted   • Bilateral pulmonary embolism 01/02/2017   • HH (hiatus hernia) 10/30/2018   • Hiatal hernia with gastroesophageal reflux disease and esophagitis 01/03/2019   • Tachycardia 05/22/2017   • Pleuritic chest pain 05/22/2017   • Intractable chronic migraine without aura and without status migrainosus 09/08/2016   • Hx pulmonary embolism 05/22/2017   • Dyspnea 10/08/2018   • Complex tear of medial meniscus of left knee as current injury 09/18/2020   • Chronic neck pain 08/31/2015   • Biceps tendinosis of right shoulder 08/27/2020   • ASHD (arteriosclerotic heart disease) 10/08/2018   • Afib 10/08/2018   • Acquired trigger finger of left middle finger 01/14/2020     Resolved Ambulatory Problems     Diagnosis Date Noted   • No Resolved Ambulatory Problems     Past Medical History:   Diagnosis Date   • GERD (gastroesophageal reflux disease)    • Pulmonary embolism    • Reflux esophagitis          PAST SURGICAL HISTORY  Past Surgical History:   Procedure Laterality Date   • CHOLECYSTECTOMY     • COLONOSCOPY     • ENDOSCOPY     • FACELIFT N/A 1/29/2021    Procedure: FULL FACE LIFT WITH  FAT TRANSFER;  Surgeon: Roberto Connelly MD;  Location: [facility] OR;  Service: New Image;  Laterality: N/A;   • INGUINAL HERNIA REPAIR     • NISSEN FUNDOPLICATION N/A 1/3/2019    Procedure: REDO LAPAROSCOPIC NISSEN FUNDOPLICATION  WITH DAVINCI ROBOT AND MESH;  Surgeon: Qi Beltre MD;  Location: [facility] OR;  Service: DaVinci   • SHOULDER SURGERY Left    • TONSILLECTOMY     • TRUNK LESION/CYST EXCISION N/A 6/3/2022    Procedure: INCISION AND DRAINAGE INFECTED CYST OF CHEST;  Surgeon: Roberto Connelly MD;  Location: [facility] OR;  Service: Plastics;  Laterality: N/A;         FAMILY HISTORY  Family History   Problem Relation Age of Onset   • Lung disease Father    • Malig Hyperthermia Neg Hx          SOCIAL HISTORY  Social History     Socioeconomic  History   • Marital status: Single   Tobacco Use   • Smoking status: Never   • Smokeless tobacco: Never   Substance and Sexual Activity   • Alcohol use: No   • Drug use: No   • Sexual activity: Not Currently     Birth control/protection: None         ALLERGIES  Erythromycin, Hydrocortisone, Cortisone, Amoxicillin, and Sulfa antibiotics        REVIEW OF SYSTEMS  Review of Systems     All systems reviewed and negative except for those discussed in HPI.       PHYSICAL EXAM    I have reviewed the triage vital signs and nursing notes.    ED Triage Vitals   Temp Heart Rate Resp BP SpO2   04/23/23 2202 04/23/23 2202 04/23/23 2202 04/23/23 2210 04/23/23 2202   97.4 °F (36.3 °C) 65 16 (!) 162/106 98 %      Temp src Heart Rate Source Patient Position BP Location FiO2 (%)   04/23/23 2202 04/23/23 2202 04/23/23 2210 04/23/23 2210 --   Tympanic Monitor Lying Right arm        GENERAL: Male no acute distress.Vital signs on my initial evaluation on my exam his blood pressure is normal.  Oxygen saturations 100% on room air heart rate is in the 60s and is normal sinus rhythm and he is afebrile.  HENT: nares patent  Head/neck/ face are symmetric without gross deformity, signs of trauma, or swelling  EYES: no scleral icterus, no conjunctival pallor.  NECK: Supple, no meningismus  CV: regular rhythm, regular rate with intact distal pulses.  No murmur or rub.  RESPIRATORY: Normal effort with no obvious respiratory distress.  Patient location of pain is inferior and lateral to his scapula and is reproducible with coughing and deep breathing and palpation.  He does have some mild crackles in the base of his lungs little more prominent in the left base versus the right.  Does not really have much of a cough on my exam it is mild and infrequent and is dry.  ABDOMEN: soft and nontender.  MUSCULOSKELETAL: no deformity.  Intact distal pulses to upper and lower extremities are equal strong and symmetric  NEURO: alert and appropriate, moves all  extremities, follows commands.  No focal motor or sensory changes  SKIN: warm, dry    Vital signs and nursing notes reviewed.        LAB RESULTS  Recent Results (from the past 24 hour(s))   ECG 12 Lead Dyspnea    Collection Time: 04/23/23 10:19 PM   Result Value Ref Range    QT Interval 407 ms   Comprehensive Metabolic Panel    Collection Time: 04/23/23 10:38 PM    Specimen: Arm, Right; Blood   Result Value Ref Range    Glucose 90 65 - 99 mg/dL    BUN 16 6 - 20 mg/dL    Creatinine 0.95 0.76 - 1.27 mg/dL    Sodium 140 136 - 145 mmol/L    Potassium 3.9 3.5 - 5.2 mmol/L    Chloride 104 98 - 107 mmol/L    CO2 25.1 22.0 - 29.0 mmol/L    Calcium 9.7 8.6 - 10.5 mg/dL    Total Protein 6.4 6.0 - 8.5 g/dL    Albumin 4.3 3.5 - 5.2 g/dL    ALT (SGPT) 24 1 - 41 U/L    AST (SGOT) 19 1 - 40 U/L    Alkaline Phosphatase 117 39 - 117 U/L    Total Bilirubin 0.8 0.0 - 1.2 mg/dL    Globulin 2.1 gm/dL    A/G Ratio 2.0 g/dL    BUN/Creatinine Ratio 16.8 7.0 - 25.0    Anion Gap 10.9 5.0 - 15.0 mmol/L    eGFR 92.2 >60.0 mL/min/1.73   Protime-INR    Collection Time: 04/23/23 10:38 PM    Specimen: Arm, Right; Blood   Result Value Ref Range    Protime 14.5 (H) 11.7 - 14.2 Seconds    INR 1.11 (H) 0.90 - 1.10   BNP    Collection Time: 04/23/23 10:38 PM    Specimen: Arm, Right; Blood   Result Value Ref Range    proBNP 89.5 0.0 - 900.0 pg/mL   D-dimer, Quantitative    Collection Time: 04/23/23 10:38 PM    Specimen: Arm, Right; Blood   Result Value Ref Range    D-Dimer, Quantitative <0.27 0.00 - 0.59 MCGFEU/mL   High Sensitivity Troponin T    Collection Time: 04/23/23 10:38 PM    Specimen: Arm, Right; Blood   Result Value Ref Range    HS Troponin T 11 <15 ng/L   Magnesium    Collection Time: 04/23/23 10:38 PM    Specimen: Arm, Right; Blood   Result Value Ref Range    Magnesium 2.0 1.6 - 2.6 mg/dL   Procalcitonin    Collection Time: 04/23/23 10:38 PM    Specimen: Arm, Right; Blood   Result Value Ref Range    Procalcitonin 0.04 0.00 - 0.25 ng/mL   CBC  Auto Differential    Collection Time: 04/23/23 10:38 PM    Specimen: Arm, Right; Blood   Result Value Ref Range    WBC 8.22 3.40 - 10.80 10*3/mm3    RBC 4.97 4.14 - 5.80 10*6/mm3    Hemoglobin 15.1 13.0 - 17.7 g/dL    Hematocrit 45.8 37.5 - 51.0 %    MCV 92.2 79.0 - 97.0 fL    MCH 30.4 26.6 - 33.0 pg    MCHC 33.0 31.5 - 35.7 g/dL    RDW 12.7 12.3 - 15.4 %    RDW-SD 42.5 37.0 - 54.0 fl    MPV 10.0 6.0 - 12.0 fL    Platelets 196 140 - 450 10*3/mm3    Neutrophil % 55.7 42.7 - 76.0 %    Lymphocyte % 32.7 19.6 - 45.3 %    Monocyte % 8.0 5.0 - 12.0 %    Eosinophil % 2.3 0.3 - 6.2 %    Basophil % 0.7 0.0 - 1.5 %    Immature Grans % 0.6 (H) 0.0 - 0.5 %    Neutrophils, Absolute 4.57 1.70 - 7.00 10*3/mm3    Lymphocytes, Absolute 2.69 0.70 - 3.10 10*3/mm3    Monocytes, Absolute 0.66 0.10 - 0.90 10*3/mm3    Eosinophils, Absolute 0.19 0.00 - 0.40 10*3/mm3    Basophils, Absolute 0.06 0.00 - 0.20 10*3/mm3    Immature Grans, Absolute 0.05 0.00 - 0.05 10*3/mm3    nRBC 0.0 0.0 - 0.2 /100 WBC   Lactic Acid, Plasma    Collection Time: 04/23/23 10:46 PM    Specimen: Arm, Right; Blood   Result Value Ref Range    Lactate 1.9 0.5 - 2.0 mmol/L   Respiratory Panel PCR w/COVID-19(SARS-CoV-2) HERMINIA/MAX/ZAKIYA/PAD/COR/MAD/JESICA In-House, NP Swab in [facility], 3-4 HR TAT - Swab, Nasopharynx    Collection Time: 04/23/23 10:48 PM    Specimen: Nasopharynx; Swab   Result Value Ref Range    ADENOVIRUS, PCR Not Detected Not Detected    Coronavirus 229E Not Detected Not Detected    Coronavirus HKU1 Not Detected Not Detected    Coronavirus NL63 Not Detected Not Detected    Coronavirus OC43 Not Detected Not Detected    COVID19 Not Detected Not Detected - Ref. Range    Human Metapneumovirus Not Detected Not Detected    Human Rhinovirus/Enterovirus Not Detected Not Detected    Influenza A PCR Not Detected Not Detected    Influenza B PCR Not Detected Not Detected    Parainfluenza Virus 1 Not Detected Not Detected    Parainfluenza Virus 2 Not Detected Not Detected     Parainfluenza Virus 3 Not Detected Not Detected    Parainfluenza Virus 4 Not Detected Not Detected    RSV, PCR Not Detected Not Detected    Bordetella pertussis pcr Not Detected Not Detected    Bordetella parapertussis PCR Not Detected Not Detected    Chlamydophila pneumoniae PCR Not Detected Not Detected    Mycoplasma pneumo by PCR Not Detected Not Detected   High Sensitivity Troponin T 2Hr    Collection Time: 04/24/23 12:56 AM    Specimen: Arm, Right; Blood   Result Value Ref Range    HS Troponin T 12 <15 ng/L    Troponin T Delta 1 >=-4 - <+4 ng/L       Ordered the above labs and independently reviewed the results.        RADIOLOGY  CT Angiogram Chest    Result Date: 4/24/2023  Patient: HERACLIO HUFFMAN  Time Out: 00:11 Exam(s): CTA CHEST EXAM:   CT Angiography Chest With Intravenous Contrast CLINICAL HISTORY:    Reason for exam: Rule out PE. Patient has history of PE in 2017. Pleuritic pain as well as a cough and shortness of breath for about 3 weeks. TECHNIQUE:   Axial computed tomographic angiography images of the chest with intravenous contrast.  This CT exam was performed according to the principle of ALARA (As Low As Reasonably Achievable) by using one or more of the following dose reduction techniques: automated exposure control, adjustment of the mA and or kV according to patient size, and or use of iterative reconstruction technique.   MIP reconstructed images were created and reviewed. COMPARISON:   No relevant prior studies available. FINDINGS:   Pulmonary arteries:  Unremarkable.  No acute pulmonary embolism.   Aorta:  Atherosclerotic changes of the aorta.  No thoracic aortic aneurysm.   Lungs:  Atelectasis at the lung bases.  No mass.   Pleural space:  Unremarkable.  No significant effusion.  No pneumothorax.   Heart:  Unremarkable.  No cardiomegaly.  No significant pericardial effusion.  No evidence of RV dysfunction.   Mediastinum:  Calcified subcarinal lymph nodes.  Correlate for history of  granulomatous disease.   Bones joints:  Degenerative changes of the spine.  No acute fracture.  No dislocation.   Soft tissues:  Unremarkable.   Lymph nodes:  See above.   Gallbladder and bile ducts:  Cholecystectomy.   Stomach and bowel:  Suspected Nissen fundoplication. IMPRESSION:       No acute pulmonary embolism.     Electronically signed by James Mcknight MD on 04-24-23 at 0011      I ordered the above noted radiological studies. Reviewed by me and discussed with radiologist.  See dictation for official radiology interpretation.      PROCEDURES    Procedures      MEDICATIONS GIVEN IN ER    Medications   sodium chloride 0.9 % flush 10 mL (has no administration in time range)   sodium chloride 0.9 % infusion (0 mL/hr Intravenous Stopped 4/24/23 0151)   ketorolac (TORADOL) injection 15 mg (15 mg Intravenous Given 4/23/23 2256)   sodium chloride 0.9 % bolus 500 mL (0 mL Intravenous Stopped 4/23/23 2326)   iopamidol (ISOVUE-370) 76 % injection 100 mL (95 mL Intravenous Given 4/23/23 2352)         All labs have been independently reviewed by me.  All radiology studies have been reviewed by me and I discussed with radiologist dictating the report when indicated below.  All EKG's independently viewed and interpreted by me.  Discussion below represents my analysis of pertinent findings related to patient's condition, differential diagnosis, treatment plan and final disposition.        PROGRESS, DATA ANALYSIS, CONSULTS, AND MEDICAL DECISION MAKING    DDx includes CHF, acute coronary syndrome, pulmonary embolism, pneumothorax, pneumonia, asthma/COPD,aspiration,  pulmonary hypertension, metabolic acidosis, deconditioning, anemia, other hematologic etiologies such as CO poisoning, anxiety.     This gentleman has a history of PE.  He is having pleuritic type of pain with cough and some shortness of breath.  We will can do a CT angiogram of his chest.  As well as lab work.  I have looked at his EKG and I do not appreciate any  obvious acute injury pattern.  This would be very atypical for coronary artery disease.  We will check enzymes.  The symptoms have been going on for 3 weeks.  His vital signs are currently unremarkable.  In talking with him he is amenable and agrees to Toradol for pain.  He states his pain is mild.  He does not want anything for narcotic for pain.  All questions answered      ED Course as of 04/24/23 0213   Sun Apr 23, 2023   2238 My own independent interpretation of the EKG that was done at 1019 was a rate of 60 it is normal sinus rhythm it is narrow complex with normal axis I do not appreciate any acute injury pattern  QT looks normal  I compared to the previous EKG that was done on December 29, 2022 and the EKGs look very similar. [MM]   Mon Apr 24, 2023   0132 D-Dimer, Quant: <0.27 [MM]   0137 I reviewed the CT angiogram report.  No signs of pulmonary embolism.  Aorta appears unremarkable with no signs of aneurysm or dissection.  There is some atelectasis at the lung bases but no signs of pulmonary consolidation.  Heart size appears unremarkable with no cardiomegaly.  No effusion.  Essentially unremarkable CT angiogram of the chest.  Please see complete dictated report from the radiologist. [MM]   0137 HS Troponin T: 12 [MM]   0137 Troponin T Delta: 1  This pain has been constant all day long.  Is very atypical for any cardiac etiology or other serious etiology.  He has had pleurisy in the past and he states this is a very mild version of that.  I anticipate with his cough that he has had for 3 weeks.  That he very well could have developed some irritation of the parietal pleura.  This probably is pleuritic in nature.  Also could have potentially strained the muscles in his back causing the pain.  Again his pain is very mild. [MM]   0141 On my reevaluation of the patient patient is resting comfortably in no distress.  Pain is resolved after the Toradol.  I informed him the results of the CT scan and his lab  work.  He states that he has been losing hair.  He is losing weight.  He asked if I think he has cancer.  I am not seeing any signs of cancer on his lab work here.  He does have a primary care physician but is not seen or followed up with him with these complaints.  Encouraged him to make sure he follows up with the primary care physicians.  Also informed him to return if he has worsening of symptoms develops any fever or any other concerns.  All questions and answered. [MM]      ED Course User Index  [MM] Nathanael Nolasco MD       AS OF 02:13 EDT VITALS:    BP - 133/97  HR - 58  TEMP - 97.4 °F (36.3 °C) (Tympanic)  02 SATS - 99%    SOCIAL DETERMINANTS OF HEALTH THAT IMPACT OR LIMIT CARE (For example..Homelessness,safe discharge, inability to obtain care, follow up, or prescriptions):      DIAGNOSIS  Final diagnoses:   Dyspnea, unspecified type   Acute left-sided thoracic back pain: Pleuritic         DISPOSITION  DISCHARGE    Patient discharged in stable condition.    Reviewed implications of results, diagnosis, meds, responsibility to follow up, warning signs and symptoms of possible worsening, potential complications and reasons to return to ER, including worsening of symptoms, worsening of pain, fever, worsening shortness of breath, any concerns..    Patient/Family voiced understanding of above instructions.    Discussed plan for discharge, as there is no emergent indication for admission. Pt/family is agreeable and understands need for follow up and repeat testing.  Pt is aware that discharge does not mean that nothing is wrong but it indicates no emergency is present that requires admission and they must continue care with follow-up as given below or physician of their choice.     FOLLOW-UP  Raeann Becerra MD  [address] [id number] [phone]      Call this morning to arrange follow-up this week.  Return if worsening of symptoms, fever, any concerns.         Medication  List      No changes were made to your prescriptions during this visit.                 DICTATED UTILIZING DRAGON DICTATION    Note Disclaimer: At [facility], we believe that sharing information builds trust and better relationships. You are receiving this note because you recently visited [facility]. It is possible you will see health information before a provider has talked with you about it. This kind of information can be easy to misunderstand. To help you fully understand what it means for your health, we urge you to discuss this note with your provider.     Nathanael Nolasco MD  04/24/23 0218

## 2023-04-28 LAB
BACTERIA SPEC AEROBE CULT: NORMAL
BACTERIA SPEC AEROBE CULT: NORMAL

## 2023-10-02 ENCOUNTER — HOSPITAL ENCOUNTER (EMERGENCY)
Facility: HOSPITAL | Age: 58
Discharge: HOME | End: 2023-10-02
Attending: EMERGENCY MEDICINE | Admitting: EMERGENCY MEDICINE
Payer: MEDICAID

## 2023-10-02 VITALS
HEART RATE: 69 BPM | RESPIRATION RATE: 16 BRPM | WEIGHT: 193 LBS | SYSTOLIC BLOOD PRESSURE: 133 MMHG | HEIGHT: 71 IN | DIASTOLIC BLOOD PRESSURE: 84 MMHG | OXYGEN SATURATION: 98 % | BODY MASS INDEX: 27.02 KG/M2 | TEMPERATURE: 98 F

## 2023-10-02 DIAGNOSIS — L02.11 NECK ABSCESS: Primary | ICD-10-CM

## 2023-10-02 PROCEDURE — 99282 EMERGENCY DEPT VISIT SF MDM: CPT

## 2023-10-02 RX ORDER — LIDOCAINE HYDROCHLORIDE AND EPINEPHRINE 10; 10 MG/ML; UG/ML
10 INJECTION, SOLUTION INFILTRATION; PERINEURAL ONCE
Status: COMPLETED | OUTPATIENT
Start: 2023-10-02 | End: 2023-10-02

## 2023-10-02 RX ORDER — CEPHALEXIN 500 MG/1
500 CAPSULE ORAL 3 TIMES DAILY
Qty: 21 CAPSULE | Refills: 0 | Status: SHIPPED | OUTPATIENT
Start: 2023-10-02 | End: 2023-10-06 | Stop reason: HOSPADM

## 2023-10-02 RX ADMIN — LIDOCAINE HYDROCHLORIDE,EPINEPHRINE BITARTRATE 10 ML: 10; .01 INJECTION, SOLUTION INFILTRATION; PERINEURAL at 15:07

## 2023-10-02 NOTE — DISCHARGE INSTRUCTIONS
Clean affected area with warm soapy water at least twice daily.  Return to the emergency department for fever, chills, redness around wound, increased swelling, or other concern.  Take antibiotics as prescribed.

## 2023-10-02 NOTE — ED PROVIDER NOTES
EMERGENCY DEPARTMENT ENCOUNTER    Room Number:  T02/02  PCP: Nathanael Phillips MD  Historian: Patient      HPI:  Chief Complaint: Possible abscess  A complete HPI/ROS/PMH/PSH/SH/FH are unobtainable due to: Nothing  Context: Oliverio Corrales is a 58 y.o. male who presents to the ED by private vehicle from home c/o possible abscess below his left ear.  He first noticed some swelling under his left ear about 1 week ago.  Since then, this area has become more swollen and painful.  He does not recall any specific insect bite or injury.  He also reports some general malaise for the past several days.  Denies fever, chills, sore throat, runny nose, chest pain, cough, or shortness of breath.            PAST MEDICAL HISTORY  Active Ambulatory Problems     Diagnosis Date Noted    Bilateral pulmonary embolism 01/02/2017    HH (hiatus hernia) 10/30/2018    Hiatal hernia with gastroesophageal reflux disease and esophagitis 01/03/2019    Tachycardia 05/22/2017    Pleuritic chest pain 05/22/2017    Intractable chronic migraine without aura and without status migrainosus 09/08/2016    Hx pulmonary embolism 05/22/2017    Dyspnea 10/08/2018    Complex tear of medial meniscus of left knee as current injury 09/18/2020    Chronic neck pain 08/31/2015    Biceps tendinosis of right shoulder 08/27/2020    ASHD (arteriosclerotic heart disease) 10/08/2018    Afib 10/08/2018    Acquired trigger finger of left middle finger 01/14/2020     Resolved Ambulatory Problems     Diagnosis Date Noted    No Resolved Ambulatory Problems     Past Medical History:   Diagnosis Date    GERD (gastroesophageal reflux disease)     Pulmonary embolism     Reflux esophagitis          PAST SURGICAL HISTORY  Past Surgical History:   Procedure Laterality Date    CHOLECYSTECTOMY      COLONOSCOPY      ENDOSCOPY      FACELIFT N/A 1/29/2021    Procedure: FULL FACE LIFT WITH  FAT TRANSFER;  Surgeon: Roberto Connelly MD;  Location: [facility];  Service: New Image;   Laterality: N/A;    INGUINAL HERNIA REPAIR      NISSEN FUNDOPLICATION N/A 1/3/2019    Procedure: REDO LAPAROSCOPIC NISSEN FUNDOPLICATION  WITH DAVINCI ROBOT AND MESH;  Surgeon: Qi Beltre MD;  Location: [facility];  Service: DaVinci    SHOULDER SURGERY Left     TONSILLECTOMY      TRUNK LESION/CYST EXCISION N/A 6/3/2022    Procedure: INCISION AND DRAINAGE INFECTED CYST OF CHEST;  Surgeon: Roberto Connelly MD;  Location: [facility];  Service: Plastics;  Laterality: N/A;         FAMILY HISTORY  Family History   Problem Relation Age of Onset    Lung disease Father     Malig Hyperthermia Neg Hx          SOCIAL HISTORY  Social History     Socioeconomic History    Marital status: Single   Tobacco Use    Smoking status: Never    Smokeless tobacco: Never   Substance and Sexual Activity    Alcohol use: No    Drug use: No    Sexual activity: Not Currently     Birth control/protection: None         ALLERGIES  Erythromycin, Hydrocortisone, Cortisone, Amoxicillin, and Sulfa antibiotics    REVIEW OF SYSTEMS  All systems have been reviewed and are negative except for those listed in the HPI      PHYSICAL EXAM  ED Triage Vitals   Temp Heart Rate Resp BP SpO2   10/02/23 1358 10/02/23 1358 10/02/23 1358 10/02/23 1417 10/02/23 1358   98 °F (36.7 °C) 87 16 133/84 98 %      Temp src Heart Rate Source Patient Position BP Location FiO2 (%)   10/02/23 1358 10/02/23 1358 10/02/23 1417 10/02/23 1417 --   Tympanic Monitor Sitting Left arm        Physical Exam      GENERAL: Awake, alert, oriented x3.  Well-developed, well-nourished and nontoxic-appearing male.  Resting comfortably in no acute distress  HENT: NCAT, nares patent, no cervical lymphadenopathy, oropharynx is benign  EYES: no scleral icterus  CV: regular rhythm, normal rate  RESPIRATORY: normal effort, clear to auscultation bilaterally  ABDOMEN: soft, nontender  MUSCULOSKELETAL: Extremities are nontender with full range of motion  NEURO: Speech is normal.  No facial  droop.  PSYCH:  calm, cooperative  SKIN: There is an approximately 2 x 1 cm tender, swollen, erythematous and fluctuant area just inferior to the left ear.    Vital signs and nursing notes reviewed.          LAB RESULTS  No results found for this or any previous visit (from the past 24 hour(s)).    Ordered the above labs and reviewed the results.        RADIOLOGY  No Radiology Exams Resulted Within Past 24 Hours    Ordered the above noted radiological studies. Reviewed by me in PACS.            PROCEDURES  Incision & Drainage    Date/Time: 10/2/2023 3:11 PM  Performed by: Kevyn Edwards MD  Authorized by: Kevyn Edwards MD     Consent:     Consent obtained:  Verbal    Consent given by:  Patient    Risks discussed:  Bleeding, incomplete drainage, pain and infection  Universal protocol:     Patient identity confirmed:  Verbally with patient  Location:     Type:  Abscess    Location:  Neck    Neck location:  L anterior  Pre-procedure details:     Skin preparation:  Povidone-iodine  Sedation:     Sedation type:  None  Anesthesia:     Anesthesia method:  Local infiltration    Local anesthetic:  Lidocaine 1% WITH epi  Procedure type:     Complexity:  Simple  Procedure details:     Needle aspiration: yes      Needle size:  18 G    Incision types:  Stab incision    Incision depth:  Dermal    Wound management:  Probed and deloculated and irrigated with saline    Drainage:  Purulent and bloody    Drainage amount:  Moderate    Wound treatment:  Wound left open    Packing materials:  None  Post-procedure details:     Procedure completion:  Tolerated well, no immediate complications              MEDICATIONS GIVEN IN ER  Medications   lidocaine 1% - EPINEPHrine 1:009586 (XYLOCAINE W/EPI) 1 %-1:323381 injection 10 mL (10 mL Injection Given by Other 10/2/23 8351)                   MEDICAL DECISION MAKING, PROGRESS, and CONSULTS    All labs have been independently reviewed by me.  All radiology studies have been reviewed  by me and I have also reviewed the radiology report.   EKG's independently viewed and interpreted by me.  Discussion below represents my analysis of pertinent findings related to patient's condition, differential diagnosis, treatment plan and final disposition.      Additional sources:  - Discussed/ obtained information from independent historians: N/A    - External (non-ED) record review: Patient was admitted here in January 2017 for bilateral pulmonary emboli.  He was discharged on Xarelto.    - Chronic or social conditions impacting care: None          Orders placed during this visit:  Orders Placed This Encounter   Procedures    Incision & Drainage    Supplies To Bedside - Notify MD When Ready- I&D Tray / Kit         Additional orders considered but not ordered:  N/A        Differential diagnosis:    Cellulitis, abscess, lymphadenitis, insect bite      Independent interpretation of labs, radiology studies, and discussions with consultants:  ED Course as of 10/02/23 1759   Mon Oct 02, 2023   1520 Patient presented to the ED with an abscess beneath his left ear.  Incision and drainage was performed without complication.  Abscess was left open as it was too small to pack with iodoform gauze.  He will be discharged with a prescription for Keflex. [WH]      ED Course User Index  [WH] Kevyn Edwards MD               DIAGNOSIS  Final diagnoses:   Neck abscess         DISPOSITION  DISCHARGE    Patient discharged in stable condition.    Reviewed implications of results, diagnosis, meds, responsibility to follow up, warning signs and symptoms of possible worsening, potential complications and reasons to return to ER, including fever, chills, increased swelling/redness, drainage from wound, or other concern.    Patient/Family voiced understanding of above instructions.    Discussed plan for discharge, as there is no emergent indication for admission. Patient referred to primary care provider for BP management due to  today's BP. Pt/family is agreeable and understands need for follow up and repeat testing.  Pt is aware that discharge does not mean that nothing is wrong but it indicates no emergency is present that requires admission and they must continue care with follow-up as given below or physician of their choice.     FOLLOW-UP  Raeann Becerra MD  [address] [facility] [facility]  [address]  [facility] [id number] [phone]      If symptoms persist         Medication List        New Prescriptions      cephalexin 500 MG capsule  Commonly known as: KEFLEX  Take 1 capsule by mouth 3 (Three) Times a Day.               Where to Get Your Medications        These medications were sent to [id number] - [place], [place] - [address] AT [facility] & [facility] - [phone] [facility] [phone]   [address] [address]      Phone: [phone]   cephalexin 500 MG capsule                   Latest Documented Vital Signs:  As of 17:59 EDT  BP- 133/84 HR- 69 Temp- 98 °F (36.7 °C) (Tympanic) O2 sat- 98%              --    Please note that portions of this were completed with a voice recognition program.       Note Disclaimer: At [facility], we believe that sharing information builds trust and better relationships. You are receiving this note because you are receiving care at [facility] or recently visited. It is possible you will see health information before a provider has talked with you about it. This kind of information can be easy to misunderstand. To help you fully understand what it means for your health, we urge you to discuss this note with your provider.             Kevyn Edwards MD  10/02/23 0723

## 2023-10-02 NOTE — ED NOTES
"Patient c/o \"possible spider bite\". He has left sided neck swelling and generally doesn't feel good for a little over one week.   "

## 2023-10-04 ENCOUNTER — HOSPITAL ENCOUNTER (OUTPATIENT)
Facility: HOSPITAL | Age: 58
Setting detail: OBSERVATION
Discharge: HOME | End: 2023-10-06
Attending: EMERGENCY MEDICINE | Admitting: INTERNAL MEDICINE
Payer: MEDICAID

## 2023-10-04 DIAGNOSIS — H60.12 CELLULITIS OF LEFT EAR: Primary | ICD-10-CM

## 2023-10-04 DIAGNOSIS — L02.11 ABSCESS, NECK: ICD-10-CM

## 2023-10-04 PROBLEM — N48.81: Status: ACTIVE | Noted: 2021-02-04

## 2023-10-04 PROBLEM — H91.90 HEARING LOSS: Status: ACTIVE | Noted: 2018-10-16

## 2023-10-04 PROBLEM — K40.90 INGUINAL HERNIA: Status: ACTIVE | Noted: 2017-01-18

## 2023-10-04 PROBLEM — R07.81 PLEURITIC PAIN: Status: ACTIVE | Noted: 2017-05-22

## 2023-10-04 PROBLEM — H35.30 MACULAR DEGENERATION: Status: ACTIVE | Noted: 2023-10-04

## 2023-10-04 PROBLEM — I26.99 PULMONARY EMBOLISM: Status: ACTIVE | Noted: 2017-01-02

## 2023-10-04 PROBLEM — Z98.890 HISTORY OF NISSEN FUNDOPLICATION: Chronic | Status: ACTIVE | Noted: 2023-10-04

## 2023-10-04 PROBLEM — N48.6 PEYRONIE'S DISEASE: Status: ACTIVE | Noted: 2020-08-03

## 2023-10-04 PROBLEM — K21.9 GASTROESOPHAGEAL REFLUX DISEASE: Status: ACTIVE | Noted: 2018-10-08

## 2023-10-04 PROBLEM — L03.90 CELLULITIS: Status: ACTIVE | Noted: 2023-10-04

## 2023-10-04 PROBLEM — I25.10 CORONARY ARTERIOSCLEROSIS: Status: ACTIVE | Noted: 2018-10-08

## 2023-10-04 PROBLEM — M19.012 OSTEOARTHRITIS OF LEFT GLENOHUMERAL JOINT: Status: ACTIVE | Noted: 2023-10-03

## 2023-10-04 PROBLEM — K52.9 ACUTE ON CHRONIC COLITIS: Status: ACTIVE | Noted: 2022-05-26

## 2023-10-04 PROBLEM — Z98.890 H/O COSMETIC SURGERY: Status: ACTIVE | Noted: 2023-10-04

## 2023-10-04 PROBLEM — F90.9 ATTENTION DEFICIT HYPERACTIVITY DISORDER: Status: ACTIVE | Noted: 2022-05-26

## 2023-10-04 PROBLEM — G56.20 CUBITAL TUNNEL SYNDROME: Status: ACTIVE | Noted: 2017-03-28

## 2023-10-04 PROBLEM — M66.312: Status: ACTIVE | Noted: 2023-10-03

## 2023-10-04 PROBLEM — G47.30 SLEEP APNEA: Status: ACTIVE | Noted: 2017-02-27

## 2023-10-04 PROBLEM — S46.009A INJURY OF TENDON OF ROTATOR CUFF: Status: ACTIVE | Noted: 2023-09-25

## 2023-10-04 PROBLEM — Z86.711 PERSONAL HISTORY OF PULMONARY EMBOLISM: Status: ACTIVE | Noted: 2017-05-22

## 2023-10-04 LAB
ALBUMIN SERPL-MCNC: 4 G/DL (ref 3.5–5.2)
ALBUMIN/GLOB SERPL: 1.7 G/DL
ALP SERPL-CCNC: 119 U/L (ref 39–117)
ALT SERPL W P-5'-P-CCNC: 22 U/L (ref 1–41)
ANION GAP SERPL CALCULATED.3IONS-SCNC: 13.5 MMOL/L (ref 5–15)
AST SERPL-CCNC: 19 U/L (ref 1–40)
BASOPHILS # BLD AUTO: 0.03 10*3/MM3 (ref 0–0.2)
BASOPHILS NFR BLD AUTO: 0.4 % (ref 0–1.5)
BILIRUB SERPL-MCNC: 0.7 MG/DL (ref 0–1.2)
BUN SERPL-MCNC: 14 MG/DL (ref 8–23)
BUN/CREAT SERPL: 15.6 (ref 7–25)
CALCIUM SPEC-SCNC: 8.9 MG/DL (ref 8.6–10.5)
CHLORIDE SERPL-SCNC: 105 MMOL/L (ref 98–107)
CO2 SERPL-SCNC: 18.5 MMOL/L (ref 22–29)
CREAT SERPL-MCNC: 0.9 MG/DL (ref 0.76–1.27)
CRP SERPL-MCNC: <0.3 MG/DL (ref 0–0.5)
DEPRECATED RDW RBC AUTO: 40.6 FL (ref 37–54)
EGFRCR SERPLBLD CKD-EPI 2021: 97.8 ML/MIN/1.73
EOSINOPHIL # BLD AUTO: 0.43 10*3/MM3 (ref 0–0.4)
EOSINOPHIL NFR BLD AUTO: 5.5 % (ref 0.3–6.2)
ERYTHROCYTE [DISTWIDTH] IN BLOOD BY AUTOMATED COUNT: 12.3 % (ref 12.3–15.4)
ERYTHROCYTE [SEDIMENTATION RATE] IN BLOOD: 1 MM/HR (ref 0–20)
GLOBULIN UR ELPH-MCNC: 2.3 GM/DL
GLUCOSE SERPL-MCNC: 120 MG/DL (ref 65–99)
HCT VFR BLD AUTO: 45.4 % (ref 37.5–51)
HGB BLD-MCNC: 15.4 G/DL (ref 13–17.7)
HOLD SPECIMEN: NORMAL
HOLD SPECIMEN: NORMAL
IMM GRANULOCYTES # BLD AUTO: 0.04 10*3/MM3 (ref 0–0.05)
IMM GRANULOCYTES NFR BLD AUTO: 0.5 % (ref 0–0.5)
LYMPHOCYTES # BLD AUTO: 1.92 10*3/MM3 (ref 0.7–3.1)
LYMPHOCYTES NFR BLD AUTO: 24.6 % (ref 19.6–45.3)
MCH RBC QN AUTO: 31 PG (ref 26.6–33)
MCHC RBC AUTO-ENTMCNC: 33.9 G/DL (ref 31.5–35.7)
MCV RBC AUTO: 91.3 FL (ref 79–97)
MONOCYTES # BLD AUTO: 0.63 10*3/MM3 (ref 0.1–0.9)
MONOCYTES NFR BLD AUTO: 8.1 % (ref 5–12)
NEUTROPHILS NFR BLD AUTO: 4.74 10*3/MM3 (ref 1.7–7)
NEUTROPHILS NFR BLD AUTO: 60.9 % (ref 42.7–76)
NRBC BLD AUTO-RTO: 0 /100 WBC (ref 0–0.2)
PLATELET # BLD AUTO: 204 10*3/MM3 (ref 140–450)
PMV BLD AUTO: 10 FL (ref 6–12)
POTASSIUM SERPL-SCNC: 3.6 MMOL/L (ref 3.5–5.2)
PROT SERPL-MCNC: 6.3 G/DL (ref 6–8.5)
RBC # BLD AUTO: 4.97 10*6/MM3 (ref 4.14–5.8)
SODIUM SERPL-SCNC: 137 MMOL/L (ref 136–145)
WBC NRBC COR # BLD: 7.79 10*3/MM3 (ref 3.4–10.8)
WHOLE BLOOD HOLD COAG: NORMAL
WHOLE BLOOD HOLD SPECIMEN: NORMAL

## 2023-10-04 PROCEDURE — 86140 C-REACTIVE PROTEIN: CPT | Performed by: INTERNAL MEDICINE

## 2023-10-04 PROCEDURE — 85025 COMPLETE CBC W/AUTO DIFF WBC: CPT | Performed by: NURSE PRACTITIONER

## 2023-10-04 PROCEDURE — 80053 COMPREHEN METABOLIC PANEL: CPT | Performed by: INTERNAL MEDICINE

## 2023-10-04 PROCEDURE — 87040 BLOOD CULTURE FOR BACTERIA: CPT | Performed by: NURSE PRACTITIONER

## 2023-10-04 PROCEDURE — 25810000003 SODIUM CHLORIDE 0.9 % SOLUTION: Performed by: INTERNAL MEDICINE

## 2023-10-04 PROCEDURE — G0378 HOSPITAL OBSERVATION PER HR: HCPCS

## 2023-10-04 PROCEDURE — 25010000002 CLINDAMYCIN 600 MG/50ML SOLUTION: Performed by: NURSE PRACTITIONER

## 2023-10-04 PROCEDURE — 96375 TX/PRO/DX INJ NEW DRUG ADDON: CPT

## 2023-10-04 PROCEDURE — 36415 COLL VENOUS BLD VENIPUNCTURE: CPT

## 2023-10-04 PROCEDURE — 99284 EMERGENCY DEPT VISIT MOD MDM: CPT

## 2023-10-04 PROCEDURE — 96361 HYDRATE IV INFUSION ADD-ON: CPT

## 2023-10-04 PROCEDURE — 85652 RBC SED RATE AUTOMATED: CPT | Performed by: INTERNAL MEDICINE

## 2023-10-04 PROCEDURE — 93005 ELECTROCARDIOGRAM TRACING: CPT | Performed by: INTERNAL MEDICINE

## 2023-10-04 PROCEDURE — 25010000002 KETOROLAC TROMETHAMINE PER 15 MG: Performed by: INTERNAL MEDICINE

## 2023-10-04 RX ORDER — POTASSIUM CHLORIDE 750 MG/1
40 TABLET, FILM COATED, EXTENDED RELEASE ORAL EVERY 4 HOURS
Status: COMPLETED | OUTPATIENT
Start: 2023-10-04 | End: 2023-10-05

## 2023-10-04 RX ORDER — ASPIRIN 81 MG/1
81 TABLET ORAL DAILY
Status: DISCONTINUED | OUTPATIENT
Start: 2023-10-05 | End: 2023-10-06 | Stop reason: HOSPADM

## 2023-10-04 RX ORDER — ACETAMINOPHEN 650 MG/1
650 SUPPOSITORY RECTAL EVERY 4 HOURS PRN
Status: DISCONTINUED | OUTPATIENT
Start: 2023-10-04 | End: 2023-10-06 | Stop reason: HOSPADM

## 2023-10-04 RX ORDER — CLONIDINE HYDROCHLORIDE 0.1 MG/1
0.1 TABLET ORAL EVERY 8 HOURS PRN
Status: DISCONTINUED | OUTPATIENT
Start: 2023-10-04 | End: 2023-10-06 | Stop reason: HOSPADM

## 2023-10-04 RX ORDER — LIDOCAINE 40 MG/G
1 CREAM TOPICAL DAILY PRN
Status: DISCONTINUED | OUTPATIENT
Start: 2023-10-04 | End: 2023-10-06 | Stop reason: HOSPADM

## 2023-10-04 RX ORDER — SODIUM CHLORIDE 0.9 % (FLUSH) 0.9 %
10 SYRINGE (ML) INJECTION AS NEEDED
Status: DISCONTINUED | OUTPATIENT
Start: 2023-10-04 | End: 2023-10-06 | Stop reason: HOSPADM

## 2023-10-04 RX ORDER — SODIUM CHLORIDE 0.9 % (FLUSH) 0.9 %
10 SYRINGE (ML) INJECTION EVERY 12 HOURS SCHEDULED
Status: DISCONTINUED | OUTPATIENT
Start: 2023-10-04 | End: 2023-10-06 | Stop reason: HOSPADM

## 2023-10-04 RX ORDER — CLINDAMYCIN PHOSPHATE 600 MG/50ML
600 INJECTION INTRAVENOUS ONCE
Status: COMPLETED | OUTPATIENT
Start: 2023-10-04 | End: 2023-10-04

## 2023-10-04 RX ORDER — KETOTIFEN FUMARATE 0.35 MG/ML
1 SOLUTION/ DROPS OPHTHALMIC 2 TIMES DAILY
Status: DISCONTINUED | OUTPATIENT
Start: 2023-10-04 | End: 2023-10-06 | Stop reason: HOSPADM

## 2023-10-04 RX ORDER — SACCHAROMYCES BOULARDII 250 MG
250 CAPSULE ORAL 2 TIMES DAILY
Status: DISCONTINUED | OUTPATIENT
Start: 2023-10-04 | End: 2023-10-06 | Stop reason: HOSPADM

## 2023-10-04 RX ORDER — SODIUM CHLORIDE 9 MG/ML
40 INJECTION, SOLUTION INTRAVENOUS AS NEEDED
Status: DISCONTINUED | OUTPATIENT
Start: 2023-10-04 | End: 2023-10-06 | Stop reason: HOSPADM

## 2023-10-04 RX ORDER — ACETAMINOPHEN 160 MG/5ML
650 SOLUTION ORAL EVERY 4 HOURS PRN
Status: DISCONTINUED | OUTPATIENT
Start: 2023-10-04 | End: 2023-10-06 | Stop reason: HOSPADM

## 2023-10-04 RX ORDER — CALCIUM CARBONATE 500 MG/1
2 TABLET, CHEWABLE ORAL 2 TIMES DAILY PRN
Status: DISCONTINUED | OUTPATIENT
Start: 2023-10-04 | End: 2023-10-06 | Stop reason: HOSPADM

## 2023-10-04 RX ORDER — ONDANSETRON 2 MG/ML
4 INJECTION INTRAMUSCULAR; INTRAVENOUS EVERY 6 HOURS PRN
Status: DISCONTINUED | OUTPATIENT
Start: 2023-10-04 | End: 2023-10-06 | Stop reason: HOSPADM

## 2023-10-04 RX ORDER — CAPSAICIN 0.75 MG/G
1 CREAM TOPICAL 4 TIMES DAILY PRN
Status: DISCONTINUED | OUTPATIENT
Start: 2023-10-04 | End: 2023-10-06 | Stop reason: HOSPADM

## 2023-10-04 RX ORDER — UREA 10 %
1 LOTION (ML) TOPICAL NIGHTLY PRN
Status: DISCONTINUED | OUTPATIENT
Start: 2023-10-04 | End: 2023-10-06 | Stop reason: HOSPADM

## 2023-10-04 RX ORDER — BISACODYL 5 MG/1
5 TABLET, DELAYED RELEASE ORAL DAILY PRN
Status: DISCONTINUED | OUTPATIENT
Start: 2023-10-04 | End: 2023-10-06 | Stop reason: HOSPADM

## 2023-10-04 RX ORDER — ACETAMINOPHEN 325 MG/1
650 TABLET ORAL EVERY 4 HOURS PRN
Status: DISCONTINUED | OUTPATIENT
Start: 2023-10-04 | End: 2023-10-06 | Stop reason: HOSPADM

## 2023-10-04 RX ORDER — CLINDAMYCIN PHOSPHATE 600 MG/50ML
600 INJECTION INTRAVENOUS EVERY 8 HOURS
Status: DISCONTINUED | OUTPATIENT
Start: 2023-10-05 | End: 2023-10-06 | Stop reason: HOSPADM

## 2023-10-04 RX ORDER — POLYETHYLENE GLYCOL 3350 17 G/17G
17 POWDER, FOR SOLUTION ORAL DAILY PRN
Status: DISCONTINUED | OUTPATIENT
Start: 2023-10-04 | End: 2023-10-06 | Stop reason: HOSPADM

## 2023-10-04 RX ORDER — AMOXICILLIN 250 MG
2 CAPSULE ORAL 2 TIMES DAILY
Status: DISCONTINUED | OUTPATIENT
Start: 2023-10-04 | End: 2023-10-06 | Stop reason: HOSPADM

## 2023-10-04 RX ORDER — KETOROLAC TROMETHAMINE 15 MG/ML
15 INJECTION, SOLUTION INTRAMUSCULAR; INTRAVENOUS EVERY 6 HOURS
Status: DISPENSED | OUTPATIENT
Start: 2023-10-04 | End: 2023-10-05

## 2023-10-04 RX ORDER — SODIUM CHLORIDE 9 MG/ML
125 INJECTION, SOLUTION INTRAVENOUS CONTINUOUS
Status: DISCONTINUED | OUTPATIENT
Start: 2023-10-04 | End: 2023-10-06 | Stop reason: HOSPADM

## 2023-10-04 RX ORDER — BISACODYL 10 MG
10 SUPPOSITORY, RECTAL RECTAL DAILY PRN
Status: DISCONTINUED | OUTPATIENT
Start: 2023-10-04 | End: 2023-10-06 | Stop reason: HOSPADM

## 2023-10-04 RX ORDER — TRAMADOL HYDROCHLORIDE 50 MG/1
25 TABLET ORAL EVERY 6 HOURS PRN
Status: DISCONTINUED | OUTPATIENT
Start: 2023-10-04 | End: 2023-10-06 | Stop reason: HOSPADM

## 2023-10-04 RX ORDER — ONDANSETRON 4 MG/1
4 TABLET, FILM COATED ORAL EVERY 6 HOURS PRN
Status: DISCONTINUED | OUTPATIENT
Start: 2023-10-04 | End: 2023-10-06 | Stop reason: HOSPADM

## 2023-10-04 RX ADMIN — KETOROLAC TROMETHAMINE 15 MG: 15 INJECTION, SOLUTION INTRAMUSCULAR; INTRAVENOUS at 18:57

## 2023-10-04 RX ADMIN — CLINDAMYCIN PHOSPHATE 600 MG: 600 INJECTION, SOLUTION INTRAVENOUS at 17:24

## 2023-10-04 RX ADMIN — POTASSIUM CHLORIDE 40 MEQ: 750 TABLET, EXTENDED RELEASE ORAL at 20:34

## 2023-10-04 RX ADMIN — Medication 10 ML: at 20:34

## 2023-10-04 RX ADMIN — SODIUM CHLORIDE 125 ML/HR: 9 INJECTION, SOLUTION INTRAVENOUS at 18:57

## 2023-10-04 RX ADMIN — Medication 250 MG: at 20:34

## 2023-10-04 NOTE — ED NOTES
"Nursing report ED to floor  Oliverio Corrales  58 y.o.  male    HPI :   Chief Complaint   Patient presents with    Abscess       Admitting doctor:   No admitting provider for patient encounter.    Admitting diagnosis:   The primary encounter diagnosis was Cellulitis of left ear. A diagnosis of Abscess, neck was also pertinent to this visit.    Code status:   Current Code Status       Date Active Code Status Order ID Comments User Context       Prior            Allergies:   Erythromycin, Hydrocortisone, Cortisone, Amoxicillin, and Sulfa antibiotics    Isolation:   No active isolations    Intake and Output  No intake or output data in the 24 hours ending 10/04/23 1711    Weight:       10/04/23  1525   Weight: 84.4 kg (186 lb)       Most recent vitals:   Vitals:    10/04/23 1525 10/04/23 1526   BP: 125/83    BP Location: Left arm    Patient Position: Sitting    Pulse: 64    Resp: 18    Temp:  98.1 °F (36.7 °C)   SpO2:  99%   Weight: 84.4 kg (186 lb)    Height: 180.3 cm (71\")        Active LDAs/IV Access:   Lines, Drains & Airways       Active LDAs       Name Placement date Placement time Site Days    Peripheral IV 10/04/23 1629 Right Antecubital 10/04/23  1629  Antecubital  less than 1                    Labs (abnormal labs have a star):   Labs Reviewed   CBC WITH AUTO DIFFERENTIAL - Abnormal; Notable for the following components:       Result Value    Eosinophils, Absolute 0.43 (*)     All other components within normal limits   BLOOD CULTURE   BLOOD CULTURE   RAINBOW DRAW    Narrative:     The following orders were created for panel order [place] Draw.  Procedure                               Abnormality         Status                     ---------                               -----------         ------                     Green Top (Gel)[[id number]]                                  In process                 Lavender Top[[id number]]                                     In process                 Gold Top - " SST[[id number]]                                   In process                 Light Blue Top[[id number]]                                   In process                   Please view results for these tests on the individual orders.   CBC AND DIFFERENTIAL    Narrative:     The following orders were created for panel order CBC & Differential.  Procedure                               Abnormality         Status                     ---------                               -----------         ------                     CBC Auto Differential[[id number]]        Abnormal            Final result                 Please view results for these tests on the individual orders.   GREEN TOP   LAVENDER TOP   GOLD TOP - SST   LIGHT BLUE TOP       EKG:   No orders to display       Meds given in ED:   Medications   clindamycin (CLEOCIN) 600 mg in dextrose 5% 50 mL IVPB (premix) (has no administration in time range)       Imaging results:  No radiology results for the last day    Ambulatory status:   - ad hung    Social issues:   Social History     Socioeconomic History    Marital status: Single   Tobacco Use    Smoking status: Never    Smokeless tobacco: Never   Substance and Sexual Activity    Alcohol use: No    Drug use: No    Sexual activity: Not Currently     Birth control/protection: None       NIH Stroke Scale:       Nadira Galindo RN  10/04/23 17:11 EDT

## 2023-10-04 NOTE — ED PROVIDER NOTES
" EMERGENCY DEPARTMENT ENCOUNTER    Room Number:  S02/02  Date seen:  10/4/2023  PCP: Nathanael Phillips MD      HPI:  Chief Complaint: Swelling of left ear   A complete HPI/ROS/PMH/PSH/SH/FH are unobtainable due to: none   Context: Oliverio Corrales is a 58 y.o. male who presents to the ED c/o swelling of left ear onset 2 days prior. He denies any trauma prior to initiation of swelling.  Notes that he does shave the area and wears earmuffs for noise cancellation and is concerned that the infection may have begun from this process. He was seen in the ED and diagnosed with an abscess which he had incised and drained.  He was then prescribed antibiotics which she has been taking as directed.  He states the area began to swell after the procedure and is now very painful again.  He denies any fevers nausea vomiting or chills.  He states he \"feels sick \".  He has been taking oral antibiotics as prescribed.            PAST MEDICAL HISTORY  Active Ambulatory Problems     Diagnosis Date Noted    Bilateral pulmonary embolism 01/02/2017    HH (hiatus hernia) 10/30/2018    Hiatal hernia with gastroesophageal reflux disease and esophagitis 01/03/2019    Tachycardia 05/22/2017    Pleuritic chest pain 05/22/2017    Intractable chronic migraine without aura and without status migrainosus 09/08/2016    Hx pulmonary embolism 05/22/2017    Dyspnea 10/08/2018    Complex tear of medial meniscus of left knee as current injury 09/18/2020    Chronic neck pain 08/31/2015    Biceps tendinosis of right shoulder 08/27/2020    ASHD (arteriosclerotic heart disease) 10/08/2018    Afib 10/08/2018    Acquired trigger finger of left middle finger 01/14/2020     Resolved Ambulatory Problems     Diagnosis Date Noted    No Resolved Ambulatory Problems     Past Medical History:   Diagnosis Date    GERD (gastroesophageal reflux disease)     Pulmonary embolism     Reflux esophagitis          PAST SURGICAL HISTORY  Past Surgical History:   Procedure " Laterality Date    CHOLECYSTECTOMY      COLONOSCOPY      ENDOSCOPY      FACELIFT N/A 1/29/2021    Procedure: FULL FACE LIFT WITH  FAT TRANSFER;  Surgeon: Roberto Connelly MD;  Location: [facility] MAIN OR;  Service: New Image;  Laterality: N/A;    INGUINAL HERNIA REPAIR      NISSEN FUNDOPLICATION N/A 1/3/2019    Procedure: REDO LAPAROSCOPIC NISSEN FUNDOPLICATION  WITH DAVINCI ROBOT AND MESH;  Surgeon: Qi Beltre MD;  Location: [facility] MAIN OR;  Service: DaVinci    SHOULDER SURGERY Left     TONSILLECTOMY      TRUNK LESION/CYST EXCISION N/A 6/3/2022    Procedure: INCISION AND DRAINAGE INFECTED CYST OF CHEST;  Surgeon: Roberto Connelly MD;  Location: [facility] MAIN OR;  Service: Plastics;  Laterality: N/A;         FAMILY HISTORY  Family History   Problem Relation Age of Onset    Lung disease Father     Malig Hyperthermia Neg Hx          SOCIAL HISTORY  Social History     Socioeconomic History    Marital status: Single   Tobacco Use    Smoking status: Never    Smokeless tobacco: Never   Substance and Sexual Activity    Alcohol use: No    Drug use: No    Sexual activity: Not Currently     Birth control/protection: None         ALLERGIES  Erythromycin, Hydrocortisone, Cortisone, Amoxicillin, and Sulfa antibiotics        REVIEW OF SYSTEMS  Review of Systems   As above  All systems reviewed and negative except for those discussed in HPI.       PHYSICAL EXAM  ED Triage Vitals   Temp Heart Rate Resp BP SpO2   10/04/23 1526 10/04/23 1525 10/04/23 1525 10/04/23 1525 10/04/23 1526   98.1 °F (36.7 °C) 64 18 125/83 99 %      Temp src Heart Rate Source Patient Position BP Location FiO2 (%)   -- 10/04/23 1525 10/04/23 1525 10/04/23 1525 --    Monitor Sitting Left arm        Physical Exam      GENERAL: Alert no acute distress  HENT: nares patent.  There is prominence at the inferior postauricular region of the left ear.  Fluctuance is noted with about 2.5 x 2 cm of induration.  Slight erythema of the skin. There is mild left auricular  displacement   EYES: no scleral icterus  CV: regular rhythm, normal rate  RESPIRATORY: normal effort  ABDOMEN: soft  MUSCULOSKELETAL: no deformity  NEURO: alert, moves all extremities, follows commands  PSYCH:  calm, cooperative  SKIN: warm, dry    Vital signs and nursing notes reviewed.          LAB RESULTS  Recent Results (from the past 24 hour(s))   CBC Auto Differential    Collection Time: 10/04/23  4:42 PM    Specimen: Blood   Result Value Ref Range    WBC 7.79 3.40 - 10.80 10*3/mm3    RBC 4.97 4.14 - 5.80 10*6/mm3    Hemoglobin 15.4 13.0 - 17.7 g/dL    Hematocrit 45.4 37.5 - 51.0 %    MCV 91.3 79.0 - 97.0 fL    MCH 31.0 26.6 - 33.0 pg    MCHC 33.9 31.5 - 35.7 g/dL    RDW 12.3 12.3 - 15.4 %    RDW-SD 40.6 37.0 - 54.0 fl    MPV 10.0 6.0 - 12.0 fL    Platelets 204 140 - 450 10*3/mm3    Neutrophil % 60.9 42.7 - 76.0 %    Lymphocyte % 24.6 19.6 - 45.3 %    Monocyte % 8.1 5.0 - 12.0 %    Eosinophil % 5.5 0.3 - 6.2 %    Basophil % 0.4 0.0 - 1.5 %    Immature Grans % 0.5 0.0 - 0.5 %    Neutrophils, Absolute 4.74 1.70 - 7.00 10*3/mm3    Lymphocytes, Absolute 1.92 0.70 - 3.10 10*3/mm3    Monocytes, Absolute 0.63 0.10 - 0.90 10*3/mm3    Eosinophils, Absolute 0.43 (H) 0.00 - 0.40 10*3/mm3    Basophils, Absolute 0.03 0.00 - 0.20 10*3/mm3    Immature Grans, Absolute 0.04 0.00 - 0.05 10*3/mm3    nRBC 0.0 0.0 - 0.2 /100 WBC       Ordered the above labs and reviewed the results.        PROCEDURES  Procedures          MEDICATIONS GIVEN IN ER  Medications   clindamycin (CLEOCIN) 600 mg in dextrose 5% 50 mL IVPB (premix) (has no administration in time range)         MEDICAL DECISION MAKING, PROGRESS, and CONSULTS    All labs have been independently reviewed by me.  All radiology studies have been reviewed by me and discussed with radiologist dictating the report.   EKG's independently viewed and interpreted by me.  Discussion below represents my analysis of pertinent findings related to patient's condition, differential  diagnosis, treatment plan and final disposition.      Orders placed during this visit:  Orders Placed This Encounter   Procedures    Blood Culture - Blood,    Blood Culture - Blood,    [place] Draw    CBC Auto Differential    [facility] (on-call MD unless specified) Details    Initiate Observation Status    CBC & Differential    Green Top (Gel)    Lavender Top    Gold Top - SST    Light Blue Top         Additional sources:      - External (non-ED) record review: Reviewed medical record 10/2/2023.  Patient seen by Dr. Edwards in the emergency department.  Incision and drainage was noted of the affected area as described above, with purulent and bloody drainage resulting.  The area was noted to be too shallow to pack and the wound was left open.  The patient was placed on Keflex at time of discharge.    - Chronic or social conditions impacting care: None        Differential diagnosis:    Cellulitis, abscess, outpatient treatment failure, retained foreign body      Patient presents with recurrence of left postauricular swelling.  Clinically the patient has an abscess on examination with fluctuance noted in the left inferior postauricular region which is visible on the face and mildly displaces the left auricle.  Given concern for scarring with repeated I&D and outpatient treatment failure, patient will be admitted for IV antibiotics and specialty consultation.    Independent interpretation of labs, radiology studies, and discussions with consultants:  ED Course as of 10/04/23 1712   Wed Oct 04, 2023   1704 WBC: 7.79  Discussed pt with Dr Caraballo who agrees to admit the pt  [JS]      ED Course User Index  [JS] Xena Segura APRN                  PPE: The patient wore a mask throughout the entire encounter. I wore a well-fitting mask.    DIAGNOSIS  Final diagnoses:   Cellulitis of left ear   Abscess, neck         DISPOSITION  Admission      Latest Documented Vital Signs:  As of 17:12 EDT  BP- 125/83 HR- 64 Temp- 98.1 °F  (36.7 °C) O2 sat- 99%      --    Please note that portions of this were completed with a voice recognition program.       Note Disclaimer: At [facility], we believe that sharing information builds trust and better relationships. You are receiving this note because you are receiving care at [facility] or recently visited. It is possible you will see health information before a provider has talked with you about it. This kind of information can be easy to misunderstand. To help you fully understand what it means for your health, we urge you to discuss this note with your provider.         Xena Segura, APRN  10/04/23 2962

## 2023-10-04 NOTE — H&P
PCP: Nathanael Phillips MD    Chief complaint   Chief Complaint   Patient presents with    Abscess       HPI  Patient is a 58 y.o. male presents with history of PE, sleep apnea and facelift in 2021 presents to the ER again due to an abscess behind his left ear.  Patient was in the ER couple days ago and had an I&D.  It was too shallow to pack and he was sent home on Keflex.  The swelling and fluctuance has increased again and he returned.  It painful and swollen and red and occasionally drains.  Patient has swollen glands around.  Its painful to open his jaw but no difficulty swallowing.    Patient did have an I&D 2022 by Dr. Connelly and plastics who also did his facelift and patient had Rare Staphylococcus lugdunensis   No known history of MRSA in the past    PAST MEDICAL HISTORY  Past Medical History:   Diagnosis Date    ASHD (arteriosclerotic heart disease) 10/08/2018    Atrial fibrillation 10/08/2018    Attention deficit hyperactivity disorder 05/26/2022    GERD (gastroesophageal reflux disease)     Hearing loss 10/16/2018    Intractable chronic migraine without aura 09/08/2016    Osteoarthritis of left glenohumeral joint 10/03/2023    Personal history of pulmonary embolism 05/22/2017    Peyronie's disease 08/03/2020    Pulmonary embolism     Reflux esophagitis     Sleep apnea 02/27/2017    Thrombosis of superficial vein of penis 02/04/2021       PAST SURGICAL HISTORY  Past Surgical History:   Procedure Laterality Date    CHOLECYSTECTOMY      COLONOSCOPY      ENDOSCOPY      FACELIFT N/A 1/29/2021    Procedure: FULL FACE LIFT WITH  FAT TRANSFER;  Surgeon: Roberto Connelly MD;  Location: [facility] OR;  Service: New Image;  Laterality: N/A;    INGUINAL HERNIA REPAIR      NISSEN FUNDOPLICATION N/A 1/3/2019    Procedure: REDO LAPAROSCOPIC NISSEN FUNDOPLICATION  WITH DAVINCI ROBOT AND MESH;  Surgeon: Qi Beltre MD;  Location: [facility] OR;  Service: DaVinci    SHOULDER SURGERY Left     TONSILLECTOMY      TRUNK  LESION/CYST EXCISION N/A 6/3/2022    Procedure: INCISION AND DRAINAGE INFECTED CYST OF CHEST;  Surgeon: Roberto Connelly MD;  Location: [facility] MAIN OR;  Service: Plastics;  Laterality: N/A;       FAMILY HISTORY  Family History   Problem Relation Age of Onset    Lung disease Father     Malig Hyperthermia Neg Hx        SOCIAL HISTORY  Social History     Tobacco Use    Smoking status: Never    Smokeless tobacco: Never   Substance Use Topics    Alcohol use: No    Drug use: No       MEDICATIONS:  Medications Prior to Admission   Medication Sig Dispense Refill Last Dose    Biotin w/ Vitamins C & E (Hair/Skin/Nails) 1250-7.5-7.5 MCG-MG-UNT chewable tablet 5 mg.   10/4/2023 at 0800    Carbonyl Iron 15 MG chewable tablet 65 mg.   10/4/2023 at 0800    cephalexin (KEFLEX) 500 MG capsule Take 1 capsule by mouth 3 (Three) Times a Day. 21 capsule 0 10/4/2023 at 0800    cyanocobalamin (VITAMIN B-12) 1000 MCG tablet Take 1 tablet by mouth Daily.   10/4/2023 at 0800    Minoxidil (ROGAINE EXTRA STRENGTH) 5 % solution 0.05 g.   10/3/2023    olopatadine (PATANOL) 0.1 % ophthalmic solution Administer 1 drop to both eyes 2 (Two) Times a Day. 5 mL 0 10/4/2023 at 0800    Ferrous Fumarate (Iron) 18 MG tablet controlled-release Take 1 tablet by mouth.   Unknown    ferrous gluconate (FERGON) 324 MG tablet Take 1 tablet by mouth Daily With Breakfast.   Unknown    tobramycin in sterile water (preservative free) injection-balanced salts ophthalmic solution Apply 1-2 drops to eye(s) as directed by provider Every 4 (Four) Hours. 5 mL 0 Unknown       Allergies:  Erythromycin, Hydrocortisone, Cortisone, Amoxicillin, and Sulfa antibiotics    Review of Systems:  Works outside, occasional allergies  Negative for following (except as per HPI):  Constitution: chills, fevers,   Eyes: change of vision, loss of vision and discharge  ENT: ear drainage, ear ringing and facial trauma  Respiratory: cough, pleuritic pain, shortness of air  Cardiovascular: chest  "pressure, pain, lower extremity edema, palpitations  Gastrointestinal: constipation, diarrhea, nausea, vomiting, pain    Integument: rash and wound  Hematologic / Lymphatic: excessive bleeding and easy bruising  Musculoskeletal: joint pain, joint stiffness, joint swelling and muscle pain  Neurological: headaches, numbness, seizures and tremors  Behavioral / Psych: anxiety, depression and hallucinations         Vital Signs  Temp:  [98.1 °F (36.7 °C)] 98.1 °F (36.7 °C)  Heart Rate:  [64] 64  Resp:  [18] 18  BP: (125)/(83) 125/83  Flowsheet Rows      Flowsheet Row First Filed Value   Admission Height 180.3 cm (71\") Documented at 10/04/2023 1525   Admission Weight 84.4 kg (186 lb) Documented at 10/04/2023 1525           Body mass index is 25.94 kg/m².    Physical Exam:  General Appearance:    Alert, cooperative, in no acute distress   Head:    Normocephalic, without obvious abnormality, atraumatic   Eyes:         conjunctivae and sclerae normal, no icterus, PERRLA   ENT:    Ears grossly intact, oral mucosa moist, flucuant and induration behind Left ear, ttp   Neck:   No adenopathy, supple, trachea midline,    Back:     Normal to inspection, range of motion normal   Lungs:     Clear to auscultation,respirations regular, even and    unlabored    Heart:    Regular rhythm and normal rate,  no murmur, normal S1 and S2,   Abdomen:     Normal bowel sounds, no masses,  soft non-tender, non-distended,    Extremities:   Moves all extremities well, no cyanosis, no edema,             Pulses:   Pulses palpable and equal bilaterally   Skin:   No bleeding, rash, bruising    Neurologic:    Psych:   Cranial nerves 2 - 12 grossly intact, sensation grossly intact,     Moves all extremities well, equal bilateral strength    Alert and Oriented x 3, Normal Affect    I washed/sanitized my hands before entering the room and immediately upon leaving the room.    LABS:  Admission on 10/04/2023   Component Date Value Ref Range Status    WBC " 10/04/2023 7.79  3.40 - 10.80 10*3/mm3 Final    RBC 10/04/2023 4.97  4.14 - 5.80 10*6/mm3 Final    Hemoglobin 10/04/2023 15.4  13.0 - 17.7 g/dL Final    Hematocrit 10/04/2023 45.4  37.5 - 51.0 % Final    MCV 10/04/2023 91.3  79.0 - 97.0 fL Final    MCH 10/04/2023 31.0  26.6 - 33.0 pg Final    MCHC 10/04/2023 33.9  31.5 - 35.7 g/dL Final    RDW 10/04/2023 12.3  12.3 - 15.4 % Final    RDW-SD 10/04/2023 40.6  37.0 - 54.0 fl Final    MPV 10/04/2023 10.0  6.0 - 12.0 fL Final    Platelets 10/04/2023 204  140 - 450 10*3/mm3 Final    Neutrophil % 10/04/2023 60.9  42.7 - 76.0 % Final    Lymphocyte % 10/04/2023 24.6  19.6 - 45.3 % Final    Monocyte % 10/04/2023 8.1  5.0 - 12.0 % Final    Eosinophil % 10/04/2023 5.5  0.3 - 6.2 % Final    Basophil % 10/04/2023 0.4  0.0 - 1.5 % Final    Immature Grans % 10/04/2023 0.5  0.0 - 0.5 % Final    Neutrophils, Absolute 10/04/2023 4.74  1.70 - 7.00 10*3/mm3 Final    Lymphocytes, Absolute 10/04/2023 1.92  0.70 - 3.10 10*3/mm3 Final    Monocytes, Absolute 10/04/2023 0.63  0.10 - 0.90 10*3/mm3 Final    Eosinophils, Absolute 10/04/2023 0.43 (H)  0.00 - 0.40 10*3/mm3 Final    Basophils, Absolute 10/04/2023 0.03  0.00 - 0.20 10*3/mm3 Final    Immature Grans, Absolute 10/04/2023 0.04  0.00 - 0.05 10*3/mm3 Final    nRBC 10/04/2023 0.0  0.0 - 0.2 /100 WBC Final    Extra Tube 10/04/2023 Hold for add-ons.   Final    Auto resulted.    Extra Tube 10/04/2023 hold for add-on   Final    Auto resulted    Extra Tube 10/04/2023 Hold for add-ons.   Final    Auto resulted.    Extra Tube 10/04/2023 Hold for add-ons.   Final    Auto resulted         DIAGNOSTICS:  No radiology results for the last day         Results Review:   I reviewed the patient's new clinical results.  Discussed with ER physician  Old records reviewed / Medical Decision Making High Complexity  I personally viewed and interpreted the patient's EKG/Telemetry data- sinus rhythm      ASSESSMENT AND PLAN        Atrial fibrillation    Attention  deficit hyperactivity disorder    Sleep apnea    Coronary arteriosclerosis    Personal history of pulmonary embolism    H/O cosmetic surgery- facelift 2022      Cellulitis-posterior auricular abscess/cellulitis with lymphadenopathy  -Post I&D in the ER couple days ago and was on Keflex and no improvement; unfortunately culture was not sent.  -We will change to clindamycin IV  -Consult his plastic surgeon and would really appreciate a culture sample  -IV and p.o. pain medication    Elevated blood pressure without a history of hypertension  -Likely secondary to pain and patient has history of ADHD  -Treat the pain first.  We will put as needed medications definitely needs to follow-up with primary care doctor.  May need chronic meds.        Atrial fibrillation  -Not on rate controlling medications  -Probably would benefit from aspirin     Sai-doubt that patient's using CPAP machine  -Might be difficult at this point due to swelling and lymphadenopathy    Chronic medical conditions being monitored- stable, continue medical management  -  Attention deficit hyperactivity disorder  - Personal history of pulmonary embolism      +DVT proph:    scd   + CODE STATUS: Full       I discussed the patient's findings and my recommendations with the patient and/or family.  Please reference all orders placed.    Perri Caraballo MD  10/04/23  17:45 EDT      This document is intended for medical expert use only. Reading of this document by patients and/or patient's family without participating in medical staff guidance may result in misinterpretation and unintended morbidity. Any interpretation of such data is the responsibility of the patient and/or family member responsible for the patient in concert with their primary or specialist providers, and NOT to be left for sources of online searches such as [employer], [employer] or similar queries. Relying on these approaches to knowledge may result in misinterpretation, misguided goals of care and  even death should patients or family members try recommendations outside of the realm of professional medical care in a supervised way.    Dictated utilizing Voice dictation:  Parts of this note may be an electronic transcription/translation of spoke language to printed text using Dragon dictation system.

## 2023-10-04 NOTE — ED PROVIDER NOTES
MD ATTESTATION NOTE    The ANNABELLA and I have discussed this patient's history, physical exam, and treatment plan.    I provided a substantive portion of the care of this patient. I personally performed the physical exam, in its entirety. The attached note describes my personal findings.      Oliverio Corrales is a 58 y.o. male who presents to the ED c/o abscess posterior and inferior to the left auricle.  This was attempted to be packed 2 days ago but was too shallow for packing.  He was prescribed Keflex.  He is back today as it has sealed off and has become swollen again.  No fever.      On exam:  GENERAL: not distressed  HENT: Fluctuant quarter size mass posterior and inferior to the left earlobe  EYES: no scleral icterus  CV: regular rhythm, regular rate  RESPIRATORY: normal effort  MUSCULOSKELETAL: no deformity  NEURO: alert, moves all extremities, follows commands  SKIN: warm, dry    Labs  No results found for this or any previous visit (from the past 24 hour(s)).    Radiology  No Radiology Exams Resulted Within Past 24 Hours    Medications given in the ED:  Medications   clindamycin (CLEOCIN) 600 mg in dextrose 5% 50 mL IVPB (premix) (has no administration in time range)       Orders placed during this visit:  Orders Placed This Encounter   Procedures    Blood Culture - Blood,    Blood Culture - Blood,    [place] Draw    CBC Auto Differential    [facility] (on-call MD unless specified) Details    CBC & Differential    Green Top (Gel)    Lavender Top    Gold Top - SST    Light Blue Top       Medical Decision Making:  ED Course as of 10/05/23 1120   Wed Oct 04, 2023   1704 WBC: 7.79  Discussed pt with Dr Caraballo who agrees to admit the pt  [JS]      ED Course User Index  [JS] Xena Segura APRN         Patient presents with a recurrent abscess to the face.  He has failed outpatient management.  Unfortunately, he was not able to be packed.  I think the patient needs to be admitted for antibiotics and have ENT  evaluate him.  Patient has had a prior facelift.  Given this, I think optimizing cosmetic outcome is particularly important which is why admitting him is the most prudent path forward.  I also think that we need to make sure that we cover him for MRSA.    Diagnosis  Final diagnoses:   Cellulitis of left ear   Abscess, neck          Deo Blair II, MD  10/05/23 [id number]

## 2023-10-05 LAB
ANION GAP SERPL CALCULATED.3IONS-SCNC: 8.3 MMOL/L (ref 5–15)
BASOPHILS # BLD AUTO: 0.04 10*3/MM3 (ref 0–0.2)
BASOPHILS NFR BLD AUTO: 0.6 % (ref 0–1.5)
BUN SERPL-MCNC: 12 MG/DL (ref 8–23)
BUN/CREAT SERPL: 13.2 (ref 7–25)
CALCIUM SPEC-SCNC: 8.3 MG/DL (ref 8.6–10.5)
CHLORIDE SERPL-SCNC: 112 MMOL/L (ref 98–107)
CO2 SERPL-SCNC: 22.7 MMOL/L (ref 22–29)
CREAT SERPL-MCNC: 0.91 MG/DL (ref 0.76–1.27)
DEPRECATED RDW RBC AUTO: 40.9 FL (ref 37–54)
EGFRCR SERPLBLD CKD-EPI 2021: 96.5 ML/MIN/1.73
EOSINOPHIL # BLD AUTO: 0.44 10*3/MM3 (ref 0–0.4)
EOSINOPHIL NFR BLD AUTO: 6.9 % (ref 0.3–6.2)
ERYTHROCYTE [DISTWIDTH] IN BLOOD BY AUTOMATED COUNT: 12.5 % (ref 12.3–15.4)
GLUCOSE SERPL-MCNC: 94 MG/DL (ref 65–99)
HBA1C MFR BLD: 5.4 % (ref 4.8–5.6)
HCT VFR BLD AUTO: 41.9 % (ref 37.5–51)
HGB BLD-MCNC: 14.4 G/DL (ref 13–17.7)
IMM GRANULOCYTES # BLD AUTO: 0.06 10*3/MM3 (ref 0–0.05)
IMM GRANULOCYTES NFR BLD AUTO: 0.9 % (ref 0–0.5)
INR PPP: 1.25 (ref 0.9–1.1)
LYMPHOCYTES # BLD AUTO: 2.31 10*3/MM3 (ref 0.7–3.1)
LYMPHOCYTES NFR BLD AUTO: 36.4 % (ref 19.6–45.3)
MCH RBC QN AUTO: 31.2 PG (ref 26.6–33)
MCHC RBC AUTO-ENTMCNC: 34.4 G/DL (ref 31.5–35.7)
MCV RBC AUTO: 90.9 FL (ref 79–97)
MONOCYTES # BLD AUTO: 0.56 10*3/MM3 (ref 0.1–0.9)
MONOCYTES NFR BLD AUTO: 8.8 % (ref 5–12)
NEUTROPHILS NFR BLD AUTO: 2.94 10*3/MM3 (ref 1.7–7)
NEUTROPHILS NFR BLD AUTO: 46.4 % (ref 42.7–76)
NRBC BLD AUTO-RTO: 0 /100 WBC (ref 0–0.2)
PLATELET # BLD AUTO: 181 10*3/MM3 (ref 140–450)
PMV BLD AUTO: 10 FL (ref 6–12)
POTASSIUM SERPL-SCNC: 4.2 MMOL/L (ref 3.5–5.2)
POTASSIUM SERPL-SCNC: 4.2 MMOL/L (ref 3.5–5.2)
PROTHROMBIN TIME: 15.9 SECONDS (ref 11.7–14.2)
QT INTERVAL: 438 MS
QTC INTERVAL: 408 MS
RBC # BLD AUTO: 4.61 10*6/MM3 (ref 4.14–5.8)
SODIUM SERPL-SCNC: 143 MMOL/L (ref 136–145)
WBC NRBC COR # BLD: 6.35 10*3/MM3 (ref 3.4–10.8)

## 2023-10-05 PROCEDURE — 25010000002 CLINDAMYCIN 600 MG/50ML SOLUTION: Performed by: INTERNAL MEDICINE

## 2023-10-05 PROCEDURE — 25010000002 KETOROLAC TROMETHAMINE PER 15 MG: Performed by: INTERNAL MEDICINE

## 2023-10-05 PROCEDURE — 80048 BASIC METABOLIC PNL TOTAL CA: CPT | Performed by: INTERNAL MEDICINE

## 2023-10-05 PROCEDURE — 96361 HYDRATE IV INFUSION ADD-ON: CPT

## 2023-10-05 PROCEDURE — 84132 ASSAY OF SERUM POTASSIUM: CPT | Performed by: INTERNAL MEDICINE

## 2023-10-05 PROCEDURE — 25810000003 SODIUM CHLORIDE 0.9 % SOLUTION: Performed by: INTERNAL MEDICINE

## 2023-10-05 PROCEDURE — 85610 PROTHROMBIN TIME: CPT | Performed by: INTERNAL MEDICINE

## 2023-10-05 PROCEDURE — 96376 TX/PRO/DX INJ SAME DRUG ADON: CPT

## 2023-10-05 PROCEDURE — 85025 COMPLETE CBC W/AUTO DIFF WBC: CPT | Performed by: INTERNAL MEDICINE

## 2023-10-05 PROCEDURE — 96365 THER/PROPH/DIAG IV INF INIT: CPT

## 2023-10-05 PROCEDURE — 83036 HEMOGLOBIN GLYCOSYLATED A1C: CPT | Performed by: INTERNAL MEDICINE

## 2023-10-05 PROCEDURE — G0378 HOSPITAL OBSERVATION PER HR: HCPCS

## 2023-10-05 RX ADMIN — Medication 10 ML: at 20:09

## 2023-10-05 RX ADMIN — ASPIRIN 81 MG: 81 TABLET, COATED ORAL at 08:56

## 2023-10-05 RX ADMIN — POTASSIUM CHLORIDE 40 MEQ: 750 TABLET, EXTENDED RELEASE ORAL at 01:03

## 2023-10-05 RX ADMIN — SODIUM CHLORIDE 125 ML/HR: 9 INJECTION, SOLUTION INTRAVENOUS at 09:02

## 2023-10-05 RX ADMIN — Medication 250 MG: at 20:08

## 2023-10-05 RX ADMIN — SODIUM CHLORIDE 125 ML/HR: 9 INJECTION, SOLUTION INTRAVENOUS at 01:03

## 2023-10-05 RX ADMIN — KETOROLAC TROMETHAMINE 15 MG: 15 INJECTION, SOLUTION INTRAMUSCULAR; INTRAVENOUS at 01:03

## 2023-10-05 RX ADMIN — KETOTIFEN FUMARATE 1 DROP: 0.25 SOLUTION OPHTHALMIC at 08:56

## 2023-10-05 RX ADMIN — CLINDAMYCIN IN 5 PERCENT DEXTROSE 600 MG: 12 INJECTION, SOLUTION INTRAVENOUS at 18:39

## 2023-10-05 RX ADMIN — KETOTIFEN FUMARATE 1 DROP: 0.25 SOLUTION OPHTHALMIC at 01:03

## 2023-10-05 RX ADMIN — KETOTIFEN FUMARATE 1 DROP: 0.25 SOLUTION OPHTHALMIC at 20:08

## 2023-10-05 RX ADMIN — CLINDAMYCIN IN 5 PERCENT DEXTROSE 600 MG: 12 INJECTION, SOLUTION INTRAVENOUS at 11:58

## 2023-10-05 RX ADMIN — SODIUM CHLORIDE 125 ML/HR: 9 INJECTION, SOLUTION INTRAVENOUS at 20:12

## 2023-10-05 RX ADMIN — CLINDAMYCIN IN 5 PERCENT DEXTROSE 600 MG: 12 INJECTION, SOLUTION INTRAVENOUS at 01:03

## 2023-10-05 RX ADMIN — Medication 250 MG: at 08:56

## 2023-10-05 RX ADMIN — Medication 10 ML: at 08:57

## 2023-10-05 NOTE — PLAN OF CARE
Goal Outcome Evaluation:  Plan of Care Reviewed With: patient        Progress: no change          Pt with abscess by left ear. Schedule meds given. No PRN given for BP overnight. IVF infusing. Potassium replaced per protocol.

## 2023-10-05 NOTE — CONSULTS
Asked to see patient regarding cutaneous neck abscess.    HPI: 58-year-old gentleman with prior history of facelift and prior neck cyst that was treated with incision and drainage by plastic surgery who presented with a 1 week history of increasing soreness.  Incision and drainage was done in the emergency department but he continued to have some discomfort.  He was admitted for IV antibiotics and he reports that he is doing significantly better at this point.  Asked to see as his plastic surgeon was apparently unavailable.  Medical record was reviewed for pertinent PMH, PSH, family history, social history and review of systems.    He has a left infra-auricular cutaneous cyst.  There is no dramatic tenderness with palpation and drainage can be seen coming from the residual opening in the skin.  After cleaning the area with rubbing alcohol, the previous incision was widened with 11 blade scalpel.  Probing with a Deisy clamp was performed revealing little bit more purulence.  An iodoform gauze wick was then placed into the cavity and a dressing was placed to catch any oozing and drainage.      He can change his dressing as needed.  He should complete the outpatient course of antibiotics and probably is good for discharge home either later today or tomorrow and can return for wick removal next week unless it falls out on its own earlier.  He should definitely follow-up with his plastic surgeon for potential definitive management of this and to make sure is not related to his prior facelift procedure.

## 2023-10-05 NOTE — PLAN OF CARE
Goal Outcome Evaluation: Patient alert. Room air. Fluids running per order. Abscess dressing reinforced.

## 2023-10-05 NOTE — PROGRESS NOTES
Name: Oliverio Corrales ADMIT: 10/4/2023   : 1965  PCP: Nathanael Phillips MD    MRN: [id number] LOS: 0 days   AGE/SEX: 58 y.o. male  ROOM: [facility]/     Subjective   Subjective   Reports decreased pain and swelling behind the left auricle.  No fever or chills.  No headache.  No focal neurological symptoms.  No internal earache or tinnitus or problems in the hearing.    Review of Systems  Cardiovascular/respiratory.  No chest pain or shortness of breath or palpitation.  No cough.  GI.  No abdominal pain or nausea or vomiting.  .  No dysuria or hematuria.     Objective   Objective   Vital Signs  Temp:  [97.5 °F (36.4 °C)-98.4 °F (36.9 °C)] 97.5 °F (36.4 °C)  Heart Rate:  [51-64] 54  Resp:  [16-18] 16  BP: (118-132)/() 118/77  SpO2:  [98 %-100 %] 98 %  on   ;   Device (Oxygen Therapy): room air    Intake/Output Summary (Last 24 hours) at 10/5/2023 1317  Last data filed at 10/5/2023 1158  Gross per 24 hour   Intake 1102.5 ml   Output --   Net 1102.5 ml     Body mass index is 25.89 kg/m².      10/04/23  1525 10/04/23  1805   Weight: 84.4 kg (186 lb) 84.2 kg (185 lb 10 oz)     Physical Exam  General.  Middle-aged gentleman.  Alert and oriented x3.  No apparent pain/distress/diaphoresis.  Normal mood and affect.  HEENT.  Pupils equal round and reactive.  Intact extraocular musculature.  No pallor or jaundice.  And urinated packed surgical wound behind the left auricle.  Moist mucous membrane.  Neck.  Supple.  No JVD.  No lymphadenopathy or thyromegaly.  Cardiovascular.  Regular rate and rhythm with no gallops or murmurs.  Chest.  Clear to auscultation bilaterally with no added sounds.  Abdomen.  Soft lax.  No tenderness.  No organomegaly.  No guarding or rebound.  Extremities.  No clubbing/cyanosis/edema.    CNS.  No acute focal neurological deficits.    Results Review:      Results from last 7 days   Lab Units 10/05/23  0433 10/04/23  1642   SODIUM mmol/L 143 137   POTASSIUM mmol/L 4.2  4.2 3.6   CHLORIDE  mmol/L 112* 105   CO2 mmol/L 22.7 18.5*   BUN mg/dL 12 14   CREATININE mg/dL 0.91 0.90   GLUCOSE mg/dL 94 120*   CALCIUM mg/dL 8.3* 8.9   AST (SGOT) U/L  --  19   ALT (SGPT) U/L  --  22     Estimated Creatinine Clearance: 102.8 mL/min (by C-G formula based on SCr of 0.91 mg/dL).  Results from last 7 days   Lab Units 10/05/23  0434   HEMOGLOBIN A1C % 5.40                           Invalid input(s):  PHOS        Invalid input(s): LDLCALC  Results from last 7 days   Lab Units 10/05/23  0434 10/04/23  1642   WBC 10*3/mm3 6.35 7.79   HEMOGLOBIN g/dL 14.4 15.4   HEMATOCRIT % 41.9 45.4   PLATELETS 10*3/mm3 181 204   MCV fL 90.9 91.3   MCH pg 31.2 31.0   MCHC g/dL 34.4 33.9   RDW % 12.5 12.3   RDW-SD fl 40.9 40.6   MPV fL 10.0 10.0   NEUTROPHIL % % 46.4 60.9   LYMPHOCYTE % % 36.4 24.6   MONOCYTES % % 8.8 8.1   EOSINOPHIL % % 6.9* 5.5   BASOPHIL % % 0.6 0.4   IMM GRAN % % 0.9* 0.5   NEUTROS ABS 10*3/mm3 2.94 4.74   LYMPHS ABS 10*3/mm3 2.31 1.92   MONOS ABS 10*3/mm3 0.56 0.63   EOS ABS 10*3/mm3 0.44* 0.43*   BASOS ABS 10*3/mm3 0.04 0.03   IMMATURE GRANS (ABS) 10*3/mm3 0.06* 0.04   NRBC /100 WBC 0.0 0.0     Results from last 7 days   Lab Units 10/05/23  0433   INR  1.25*             Results from last 7 days   Lab Units 10/04/23  1642   SED RATE mm/hr 1   CRP mg/dL <0.30                           Imaging:  Imaging Results (Last 24 Hours)       ** No results found for the last 24 hours. **               I reviewed the patient's new clinical results / labs / tests / procedures      Assessment/Plan     Active Hospital Problems    Diagnosis  POA    **Cellulitis [L03.90]  Yes    H/O cosmetic surgery- facelift 2022 [Z98.890]  Not Applicable    History of Nissen fundoplication [Z98.890]  Not Applicable    Attention deficit hyperactivity disorder [F90.9]  Yes    Atrial fibrillation [I48.91]  Yes    Coronary arteriosclerosis [I25.10]  Yes    Personal history of pulmonary embolism [Z86.711]  Yes    Sleep apnea [G47.30]  Yes      Resolved  Hospital Problems   No resolved problems to display.           Left posterior auricle infected sebaceous cyst and cellulitis.  ENT has extended the surgical wound that the patient had 2 days ago in the emergency department and incision and drainage performed and the wound was packed with iodoform.  Clinically improving.  No fever.  No leukocytosis.  We will continue clindamycin.  Unfortunately no cultures were taken.  Blood cultures are pending.  History of provoked PE and atrial fibrillation/coronary atherosclerosis/elevated blood pressure without diagnosis of hypertension.  Not on anticoagulation.  Stable cardiopulmonary status.  Continue aspirin.  Blood pressure is normal now and could have been secondary to pain.  History of ADHD.  On no treatment.  History of obstructive sleep apnea.  On no treatment.  History of GERD.  Asymptomatic with benign GI examination on no treatment  History of migraine.  Stable.  Negative CNS examination.  No treatment.  VTE prophylaxis.  Sequential compression devices.    Discussed my findings and plan of treatment with the patient.  Disposition.  Hopefully home tomorrow if continues to improve.        Shannon Scott MD  [facility]  10/05/23  13:17 EDT

## 2023-10-06 VITALS
SYSTOLIC BLOOD PRESSURE: 136 MMHG | HEART RATE: 50 BPM | WEIGHT: 185.63 LBS | DIASTOLIC BLOOD PRESSURE: 92 MMHG | OXYGEN SATURATION: 98 % | BODY MASS INDEX: 25.99 KG/M2 | RESPIRATION RATE: 16 BRPM | HEIGHT: 71 IN | TEMPERATURE: 97.7 F

## 2023-10-06 LAB
ANION GAP SERPL CALCULATED.3IONS-SCNC: 8.1 MMOL/L (ref 5–15)
BASOPHILS # BLD AUTO: 0.05 10*3/MM3 (ref 0–0.2)
BASOPHILS NFR BLD AUTO: 0.8 % (ref 0–1.5)
BUN SERPL-MCNC: 10 MG/DL (ref 8–23)
BUN/CREAT SERPL: 11.5 (ref 7–25)
CALCIUM SPEC-SCNC: 9.1 MG/DL (ref 8.6–10.5)
CHLORIDE SERPL-SCNC: 108 MMOL/L (ref 98–107)
CO2 SERPL-SCNC: 24.9 MMOL/L (ref 22–29)
CREAT SERPL-MCNC: 0.87 MG/DL (ref 0.76–1.27)
DEPRECATED RDW RBC AUTO: 40.2 FL (ref 37–54)
EGFRCR SERPLBLD CKD-EPI 2021: 98.8 ML/MIN/1.73
EOSINOPHIL # BLD AUTO: 0.39 10*3/MM3 (ref 0–0.4)
EOSINOPHIL NFR BLD AUTO: 6.2 % (ref 0.3–6.2)
ERYTHROCYTE [DISTWIDTH] IN BLOOD BY AUTOMATED COUNT: 12.4 % (ref 12.3–15.4)
GLUCOSE SERPL-MCNC: 93 MG/DL (ref 65–99)
HCT VFR BLD AUTO: 45.5 % (ref 37.5–51)
HGB BLD-MCNC: 15.6 G/DL (ref 13–17.7)
IMM GRANULOCYTES # BLD AUTO: 0.05 10*3/MM3 (ref 0–0.05)
IMM GRANULOCYTES NFR BLD AUTO: 0.8 % (ref 0–0.5)
LYMPHOCYTES # BLD AUTO: 1.88 10*3/MM3 (ref 0.7–3.1)
LYMPHOCYTES NFR BLD AUTO: 30 % (ref 19.6–45.3)
MCH RBC QN AUTO: 30.9 PG (ref 26.6–33)
MCHC RBC AUTO-ENTMCNC: 34.3 G/DL (ref 31.5–35.7)
MCV RBC AUTO: 90.1 FL (ref 79–97)
MONOCYTES # BLD AUTO: 0.46 10*3/MM3 (ref 0.1–0.9)
MONOCYTES NFR BLD AUTO: 7.3 % (ref 5–12)
NEUTROPHILS NFR BLD AUTO: 3.44 10*3/MM3 (ref 1.7–7)
NEUTROPHILS NFR BLD AUTO: 54.9 % (ref 42.7–76)
NRBC BLD AUTO-RTO: 0 /100 WBC (ref 0–0.2)
PLATELET # BLD AUTO: 198 10*3/MM3 (ref 140–450)
PMV BLD AUTO: 10.1 FL (ref 6–12)
POTASSIUM SERPL-SCNC: 4.2 MMOL/L (ref 3.5–5.2)
RBC # BLD AUTO: 5.05 10*6/MM3 (ref 4.14–5.8)
SODIUM SERPL-SCNC: 141 MMOL/L (ref 136–145)
WBC NRBC COR # BLD: 6.27 10*3/MM3 (ref 3.4–10.8)

## 2023-10-06 PROCEDURE — G0378 HOSPITAL OBSERVATION PER HR: HCPCS

## 2023-10-06 PROCEDURE — 85025 COMPLETE CBC W/AUTO DIFF WBC: CPT | Performed by: INTERNAL MEDICINE

## 2023-10-06 PROCEDURE — 25010000002 CLINDAMYCIN 600 MG/50ML SOLUTION: Performed by: INTERNAL MEDICINE

## 2023-10-06 PROCEDURE — 80048 BASIC METABOLIC PNL TOTAL CA: CPT | Performed by: INTERNAL MEDICINE

## 2023-10-06 RX ORDER — NAPROXEN 500 MG/1
500 TABLET ORAL 2 TIMES DAILY WITH MEALS
Qty: 30 TABLET | Refills: 0 | Status: SHIPPED | OUTPATIENT
Start: 2023-10-06

## 2023-10-06 RX ORDER — CLINDAMYCIN HYDROCHLORIDE 300 MG/1
300 CAPSULE ORAL 3 TIMES DAILY
Qty: 27 CAPSULE | Refills: 0 | Status: SHIPPED | OUTPATIENT
Start: 2023-10-06 | End: 2023-10-15

## 2023-10-06 RX ORDER — TRAMADOL HYDROCHLORIDE 50 MG/1
25 TABLET ORAL EVERY 6 HOURS PRN
Qty: 12 TABLET | Refills: 0 | Status: SHIPPED | OUTPATIENT
Start: 2023-10-06 | End: 2023-10-11

## 2023-10-06 RX ORDER — ASPIRIN 81 MG/1
81 TABLET ORAL DAILY
Qty: 30 TABLET | Refills: 3 | Status: SHIPPED | OUTPATIENT
Start: 2023-10-06

## 2023-10-06 RX ADMIN — ASPIRIN 81 MG: 81 TABLET, COATED ORAL at 09:12

## 2023-10-06 RX ADMIN — Medication 10 ML: at 09:28

## 2023-10-06 RX ADMIN — CLINDAMYCIN IN 5 PERCENT DEXTROSE 600 MG: 12 INJECTION, SOLUTION INTRAVENOUS at 09:12

## 2023-10-06 RX ADMIN — CLINDAMYCIN IN 5 PERCENT DEXTROSE 600 MG: 12 INJECTION, SOLUTION INTRAVENOUS at 02:39

## 2023-10-06 RX ADMIN — KETOTIFEN FUMARATE 1 DROP: 0.25 SOLUTION OPHTHALMIC at 09:13

## 2023-10-06 RX ADMIN — Medication 250 MG: at 09:12

## 2023-10-06 NOTE — CASE MANAGEMENT/SOCIAL WORK
Discharge Planning Assessment  [facility]     Patient Name: Oliverio Corrales  MRN: [id number]  Today's Date: 10/6/2023    Admit Date: 10/4/2023    Plan: Home with family to transport.   Discharge Needs Assessment       Row Name 10/06/23 0850       Living Environment    People in Home parent(s)    Current Living Arrangements home    Potentially Unsafe Housing Conditions none    Primary Care Provided by self    Provides Primary Care For parent(s)    Family Caregiver if Needed sibling(s)    Family Caregiver Names Teagan/ sister [phone]    Quality of Family Relationships unable to assess    Able to Return to Prior Arrangements yes       Resource/Environmental Concerns    Resource/Environmental Concerns none       Transition Planning    Patient/Family Anticipates Transition to home with family    Patient/Family Anticipated Services at Transition     Transportation Anticipated family or friend will provide;car, drives self       Discharge Needs Assessment    Readmission Within the Last 30 Days no previous admission in last 30 days    Equipment Currently Used at Home none    Concerns to be Addressed denies needs/concerns at this time;discharge planning                   Discharge Plan       Row Name 10/06/23 0851       Plan    Plan Home with family to transport.    Roadmap to Recovery Yes    Patient/Family in Agreement with Plan yes    Provided Post Acute Provider List? N/A    N/A Provider List Comment Patient denies needs.    Provided Post Acute Provider Quality & Resource List? N/A    N/A Quality & Resource List Comment Patient denies needs.    Plan Comments Spoke with patient at bedside. Introduced self and explained CCP role. Verified face sheet and local pharmacy is [facility]; patient screened for [facility], patient chose to enroll, EPIC update. 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 Patient denies HH, SNF, and DME history. 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 Patient denies discharge needs at this time. Patient plans to return home with family/ friend to transport.                  Continued Care and Services - Admitted Since 10/4/2023    Coordination has not been started for this encounter.       Expected Discharge Date and Time       Expected Discharge Date Expected Discharge Time    Oct 6, 2023            Demographic Summary       Row Name 10/06/23 0849       General Information    Admission Type observation    Referral Source admission list    Reason for Consult discharge planning    Preferred Language English                   Functional Status       Row Name 10/06/23 0850       Functional Status    Usual Activity Tolerance good    Current Activity Tolerance good       Functional Status, IADL    Medications independent    Meal Preparation independent    Housekeeping independent    Laundry independent    Shopping independent       Mental Status    General Appearance WDL WDL       Mental Status Summary    Recent Changes in Mental Status/Cognitive Functioning no changes       Employment/    Employment Status self-employed    Current or Previous Occupation self-employed                   Psychosocial    No documentation.                  Abuse/Neglect    No documentation.                  Legal    No documentation.                  Substance Abuse    No documentation.                  Patient Forms    No documentation.                     Sandy Westbrook RN

## 2023-10-06 NOTE — DISCHARGE SUMMARY
Patient Name: Oliverio Corrales  : 1965  MRN: [id number]    Date of Admission: 10/4/2023  Date of Discharge:  10/6/2023  Primary Care Physician: Nathanael Phillips MD      Discharge Diagnoses     Active Hospital Problems    Diagnosis  POA    **Cellulitis [L03.90]  Yes    H/O cosmetic surgery- facelift  [Z98.890]  Not Applicable    History of Nissen fundoplication [Z98.890]  Not Applicable    Attention deficit hyperactivity disorder [F90.9]  Yes    Atrial fibrillation [I48.91]  Yes    Coronary arteriosclerosis [I25.10]  Yes    Personal history of pulmonary embolism [Z86.711]  Yes    Sleep apnea [G47.30]  Yes      Resolved Hospital Problems   No resolved problems to display.        Hospital Course     Brief admission history and physical.  Please refer to the H&P for full detail.  A pleasant 58 years old gentleman with a past history of provoked A-fib/PE/coronary artery atherosclerosis/history of face left/ADHD/obstructive sleep apnea/GERD/migraine who had a painful swelling and redness behind the left ear a few days ago for which she had an I&D in the emergency department 2 days ago and placed on Keflex but has swelling and pain increased presented back and he was subsequently admitted with infected left auricle posterior sebaceous cyst.  No other symptomatology.  Physical examination was remarkable for a temperature of 98.1 a pulse of 64 respirate rate of 18 blood pressure 125/83.  Rest of the examination is remarkable for induration and fluctuation behind the left ear.  Hospital course.  Initial ER evaluation included a CBC that was normal.  CMP that was normal except random blood sugar of 128, CO2 18.5, alkaline phosphatase 119.  CRP and ESR were normal.  Blood cultures obtained.  INR 1.25.  Patient was admitted with left posterior auricle infected sebaceous cyst associated with cellulitis with failure of Keflex as an outpatient.  He was placed on IV clindamycin.  ENT has been consulted and they  performed an extension of the surgical wound with repeat drainage and wound packing with iodoform.  Patient pain is dramatically improved.  He developed no fever or leukocytosis.  Blood cultures were negative by the time of discharge.  Unfortunately no wound cultures were taken.  There was no clinical evidence of extension to the mastoid or the CNS system.  At the time of discharge he was hemodynamically stable and was switched to p.o. clindamycin to complete a total of 10 days of antibiotic.  He will have an ENT follow-up in 1 week.  No dressing of the wound is needed.  He has a history of provoked PE and atrial fibrillation and also history of coronary atherosclerosis and was noted to have a high blood pressure on admission without the diagnosis of hypertension.  Elevated blood pressure has normalized after pain control.  There was no evidence of angina or congestive heart failure.  We added aspirin to the patient's regimen.  He will follow-up with his primary MD for the above.  Has a history of ADHD on no treatment and obstructive sleep apnea on no treatment.  He also have a history of GERD he was asymptomatic with benign GI examination.  He has a history of migraine that was asymptomatic with negative CNS examination and on no treatment.  At the time of discharge he was hemodynamically stable.  Follow-up in 1 week with primary MD and ENT.    Consultants     Consult Orders (all) (From admission, onward)       Start     Ordered    10/05/23 0925  Inpatient ENT Consult  Once        Specialty:  Otolaryngology  Provider:  Donavon Cassidy MD    10/05/23 0925    10/04/23 1735  Inpatient Plastic Surgery Consult  Once        Specialty:  Plastic Surgery  Provider:  Roberto Connelly MD    10/04/23 1735    10/04/23 1631  [facility] (on-call MD unless specified) Details  Once,   Status:  Canceled        Specialty:  Hospitalist  Provider:  (Not yet assigned)    10/04/23 1631                  Procedures     Imaging Results (All)        None            Pertinent Labs     Results from last 7 days   Lab Units 10/05/23  0434 10/04/23  1642   WBC 10*3/mm3 6.35 7.79   HEMOGLOBIN g/dL 14.4 15.4   PLATELETS 10*3/mm3 181 204     Results from last 7 days   Lab Units 10/05/23  0433 10/04/23  1642   SODIUM mmol/L 143 137   POTASSIUM mmol/L 4.2  4.2 3.6   CHLORIDE mmol/L 112* 105   CO2 mmol/L 22.7 18.5*   BUN mg/dL 12 14   CREATININE mg/dL 0.91 0.90   GLUCOSE mg/dL 94 120*   Estimated Creatinine Clearance: 102.8 mL/min (by C-G formula based on SCr of 0.91 mg/dL).  Results from last 7 days   Lab Units 10/04/23  1642   ALBUMIN g/dL 4.0   BILIRUBIN mg/dL 0.7   ALK PHOS U/L 119*   AST (SGOT) U/L 19   ALT (SGPT) U/L 22     Results from last 7 days   Lab Units 10/05/23  0433 10/04/23  1642   CALCIUM mg/dL 8.3* 8.9   ALBUMIN g/dL  --  4.0               Invalid input(s): LDLCALC  Results from last 7 days   Lab Units 10/04/23  1711 10/04/23  1642   BLOODCX  No growth at 24 hours No growth at 24 hours     Imaging Results (Last 24 Hours)       ** No results found for the last 24 hours. **            Test Results Pending at Discharge     Pending Labs       Order Current Status    Basic Metabolic Panel Collected (10/06/23 0750)    CBC & Differential Collected (10/06/23 0750)    CBC Auto Differential Collected (10/06/23 0750)    Blood Culture - Blood, Arm, Left Preliminary result    Blood Culture - Blood, Hand, Right Preliminary result              Discharge Exam   Physical Exam  Vitals.  Temperature 97.7 a pulse of 50 respirate rate of 16 and blood pressure 136/92 and O2 sats of 98% on room air d  General.  Middle-aged gentleman.  Alert and oriented x3.  No apparent pain/distress/diaphoresis.  Normal mood and affect.  HEENT.  Pupils equal round and reactive.  Intact extraocular musculature.  No pallor or jaundice.  Packed wound behind the left auricle with induration and redness but no active discharge..  Moist mucous membrane.  Neck.  Supple.  No JVD.  No  "lymphadenopathy or thyromegaly.  Cardiovascular.  Regular rate and rhythm with no gallops or murmurs.  Chest.  Clear to auscultation bilaterally with no added sounds.  Abdomen.  Soft lax.  No tenderness.  No organomegaly.  No guarding or rebound.  Extremities.  No clubbing/cyanosis/edema.    CNS.  No acute focal neurological deficits.        Discharge Medications        New Medications        Instructions Start Date   aspirin 81 MG EC tablet   81 mg, Oral, Daily      clindamycin 300 MG capsule  Commonly known as: Cleocin   300 mg, Oral, 3 Times Daily      naproxen 500 MG tablet  Commonly known as: Naprosyn   500 mg, Oral, 2 Times Daily With Meals      traMADol 50 MG tablet  Commonly known as: ULTRAM   25 mg, Oral, Every 6 Hours PRN             Continue These Medications        Instructions Start Date   Carbonyl Iron 15 MG chewable tablet   65 mg      cyanocobalamin 1000 MCG tablet  Commonly known as: VITAMIN B-12   1,000 mcg, Oral, Daily      ferrous gluconate 324 MG tablet  Commonly known as: FERGON   324 mg, Oral, Daily With Breakfast      Hair/Skin/Nails 1250-7.5-7.5 MCG-MG-UNT chewable tablet  Generic drug: Biotin w/ Vitamins C & E   5 mg      olopatadine 0.1 % ophthalmic solution  Commonly known as: PATANOL   1 drop, Both Eyes, 2 Times Daily      ROGAINE EXTRA STRENGTH 5 % solution  Generic drug: Minoxidil   0.05 g      tobramycin in sterile water (preservative free) injection-balanced salts ophthalmic solution   1-2 drops, Ophthalmic, Every 4 Hours             Stop These Medications      cephalexin 500 MG capsule  Commonly known as: KEFLEX     Iron 18 MG tablet controlled-release              Allergies   Allergen Reactions    Erythromycin GI Intolerance    Hydrocortisone Nausea Only, Palpitations and Confusion     \"injecting cortisone\", can tolerate skin cream just fine. Patient states he can take it heart races    Cortisone Palpitations    Amoxicillin GI Intolerance     Category: Allergy;     Sulfa " Antibiotics GI Intolerance     Category: Allergy;          Discharge Disposition:  Condition: Stable    Diet:   Diet Order   Procedures    Diet: Cardiac Diets; Healthy Heart (2-3 Na+); Texture: Regular Texture (IDDSI 7); Fluid Consistency: Thin (IDDSI 0)       Activity:   Activity Instructions       Activity as Tolerated                CODE STATUS:    Code Status and Medical Interventions:   Ordered at: 10/04/23 6670     Code Status (Patient has no pulse and is not breathing):    CPR (Attempt to Resuscitate)     Medical Interventions (Patient has pulse or is breathing):    Full       No future appointments.  Additional Instructions for the Follow-ups that You Need to Schedule       Call MD With Problems / Concerns   As directed      Instructions: Call MD or return to ER if headache/neurological symptoms/dizziness/increasing pain in the surgical wound/purulent discharge from surgical wound/fever or chills/chest pain/shortness of breath    Order Comments: Instructions: Call MD or return to ER if headache/neurological symptoms/dizziness/increasing pain in the surgical wound/purulent discharge from surgical wound/fever or chills/chest pain/shortness of breath         Discharge Follow-up with PCP   As directed       Currently Documented PCP:    Nathanael Phillips MD    PCP Phone Number:    [phone]     Follow Up Details: Primary MD.  1 week.  Left posterior auricular infected sebaceous cyst and cellulitis/history of PE, A-fib, coronary atherosclerosis/ADHD/obstructive sleep apnea/GERD/mild        Discharge Follow-up with Specified Provider: ENT; 1 Week   As directed      To: ENT   Follow Up: 1 Week   Follow Up Details: Infected left auricle sebaceous cyst with cellulitis status post incision and drainage               Follow-up Information       Nathanael Phillips MD .    Specialty: Nurse Practitioner  Why: Primary MD.  1 week.  Left posterior auricular infected sebaceous cyst and cellulitis/history of PE, A-fib,  coronary atherosclerosis/ADHD/obstructive sleep apnea/GERD/mild  Contact information:  [address]  [place] [address]  [phone]                               Time Spent on Discharge:  Greater than 30 minutes      Shannon Scott MD  [facility]  10/06/23  08:17 EDT

## 2023-10-06 NOTE — PLAN OF CARE
Goal Outcome Evaluation:Patient alert. Room air. Fluids running per order. Abscess dressing removed per patient request. RN offered to redress it but the patient refused. Iv removed. D/c teaching complete

## 2023-10-06 NOTE — PLAN OF CARE
Problem: Adult Inpatient Plan of Care  Goal: Plan of Care Review  Outcome: Ongoing, Progressing  Flowsheets (Taken 10/6/2023 0552)  Progress: no change  Plan of Care Reviewed With: patient  Goal: Patient-Specific Goal (Individualized)  Outcome: Ongoing, Progressing  Goal: Absence of Hospital-Acquired Illness or Injury  Outcome: Ongoing, Progressing  Intervention: Identify and Manage Fall Risk  Recent Flowsheet Documentation  Taken 10/6/2023 0418 by Yeni Ozuna RN  Safety Promotion/Fall Prevention: safety round/check completed  Taken 10/6/2023 0231 by Yeni Ozuna RN  Safety Promotion/Fall Prevention: safety round/check completed  Taken 10/6/2023 0008 by Yeni Ozuna RN  Safety Promotion/Fall Prevention: safety round/check completed  Taken 10/5/2023 2209 by Yeni Ozuna RN  Safety Promotion/Fall Prevention: safety round/check completed  Taken 10/5/2023 2000 by Yeni Ozuna RN  Safety Promotion/Fall Prevention:   activity supervised   clutter free environment maintained   assistive device/personal items within reach   safety round/check completed  Intervention: Prevent Infection  Recent Flowsheet Documentation  Taken 10/5/2023 2000 by Yeni Ozuna RN  Infection Prevention: hand hygiene promoted  Goal: Optimal Comfort and Wellbeing  Outcome: Ongoing, Progressing  Intervention: Monitor Pain and Promote Comfort  Recent Flowsheet Documentation  Taken 10/5/2023 2000 by Yeni Ozuna RN  Pain Management Interventions: pain management plan reviewed with patient/caregiver  Intervention: Provide Person-Centered Care  Recent Flowsheet Documentation  Taken 10/5/2023 2000 by Yeni Ozuna RN  Trust Relationship/Rapport:   choices provided   care explained   thoughts/feelings acknowledged  Goal: Readiness for Transition of Care  Outcome: Ongoing, Progressing     Problem: Pain Acute  Goal: Acceptable Pain Control and Functional Ability  Outcome: Ongoing, Progressing  Intervention: Prevent or Manage  Pain  Recent Flowsheet Documentation  Taken 10/5/2023 2000 by Yeni Ozuna, RN  Medication Review/Management: medications reviewed  Intervention: Develop Pain Management Plan  Recent Flowsheet Documentation  Taken 10/5/2023 2000 by Yeni Ozuna, RN  Pain Management Interventions: pain management plan reviewed with patient/caregiver  Intervention: Optimize Psychosocial Wellbeing  Recent Flowsheet Documentation  Taken 10/5/2023 2000 by Yeni Ozuna, RN  Diversional Activities: smartphone   Goal Outcome Evaluation:  Plan of Care Reviewed With: patient        Progress: no change

## 2023-10-09 LAB
BACTERIA SPEC AEROBE CULT: NORMAL
BACTERIA SPEC AEROBE CULT: NORMAL

## 2023-10-09 NOTE — CASE MANAGEMENT/SOCIAL WORK
Case Management Discharge Note      Final Note: Patient discharged home with family transport. Orders reviewed; no further discharge needs noted.    Provided Post Acute Provider List?: N/A  N/A Provider List Comment: Patient denies needs.  Provided Post Acute Provider Quality & Resource List?: N/A  N/A Quality & Resource List Comment: Patient denies needs.    Selected Continued Care - Discharged on 10/6/2023 Admission date: 10/4/2023 - Discharge disposition: Home or Self Care      Destination    No services have been selected for the patient.                Durable Medical Equipment    No services have been selected for the patient.                Dialysis/Infusion    No services have been selected for the patient.                Home Medical Care    No services have been selected for the patient.                Therapy    No services have been selected for the patient.                Community Resources    No services have been selected for the patient.                Community & DME    No services have been selected for the patient.                         Final Discharge Disposition Code: 01 - home or self-care

## 2024-01-20 ENCOUNTER — APPOINTMENT (OUTPATIENT)
Dept: GENERAL RADIOLOGY | Facility: HOSPITAL | Age: 59
End: 2024-01-20
Payer: MEDICAID

## 2024-01-20 ENCOUNTER — HOSPITAL ENCOUNTER (OUTPATIENT)
Facility: HOSPITAL | Age: 59
Discharge: HOME | End: 2024-01-21
Attending: EMERGENCY MEDICINE | Admitting: EMERGENCY MEDICINE
Payer: MEDICAID

## 2024-01-20 DIAGNOSIS — E86.0 DEHYDRATION: ICD-10-CM

## 2024-01-20 DIAGNOSIS — R19.7 DIARRHEA, UNSPECIFIED TYPE: Primary | ICD-10-CM

## 2024-01-20 DIAGNOSIS — N17.9 AKI (ACUTE KIDNEY INJURY): ICD-10-CM

## 2024-01-20 DIAGNOSIS — R00.2 PALPITATIONS: ICD-10-CM

## 2024-01-20 DIAGNOSIS — R11.0 NAUSEA: ICD-10-CM

## 2024-01-20 PROCEDURE — 84443 ASSAY THYROID STIM HORMONE: CPT | Performed by: PHYSICIAN ASSISTANT

## 2024-01-20 PROCEDURE — 83690 ASSAY OF LIPASE: CPT | Performed by: PHYSICIAN ASSISTANT

## 2024-01-20 PROCEDURE — 80053 COMPREHEN METABOLIC PANEL: CPT

## 2024-01-20 PROCEDURE — 85025 COMPLETE CBC W/AUTO DIFF WBC: CPT

## 2024-01-20 PROCEDURE — 84439 ASSAY OF FREE THYROXINE: CPT | Performed by: PHYSICIAN ASSISTANT

## 2024-01-20 PROCEDURE — 83735 ASSAY OF MAGNESIUM: CPT | Performed by: PHYSICIAN ASSISTANT

## 2024-01-20 PROCEDURE — 99285 EMERGENCY DEPT VISIT HI MDM: CPT

## 2024-01-20 PROCEDURE — 93005 ELECTROCARDIOGRAM TRACING: CPT

## 2024-01-20 PROCEDURE — 71046 X-RAY EXAM CHEST 2 VIEWS: CPT

## 2024-01-20 PROCEDURE — 93010 ELECTROCARDIOGRAM REPORT: CPT | Performed by: STUDENT IN AN ORGANIZED HEALTH CARE EDUCATION/TRAINING PROGRAM

## 2024-01-20 PROCEDURE — 84484 ASSAY OF TROPONIN QUANT: CPT

## 2024-01-20 RX ORDER — SODIUM CHLORIDE 0.9 % (FLUSH) 0.9 %
10 SYRINGE (ML) INJECTION AS NEEDED
Status: DISCONTINUED | OUTPATIENT
Start: 2024-01-20 | End: 2024-01-21 | Stop reason: HOSPADM

## 2024-01-20 RX ORDER — ASPIRIN 325 MG
325 TABLET ORAL ONCE
Status: COMPLETED | OUTPATIENT
Start: 2024-01-20 | End: 2024-01-20

## 2024-01-20 RX ADMIN — ASPIRIN 325 MG: 325 TABLET ORAL at 23:40

## 2024-01-21 ENCOUNTER — APPOINTMENT (OUTPATIENT)
Dept: CT IMAGING | Facility: HOSPITAL | Age: 59
End: 2024-01-21
Payer: MEDICAID

## 2024-01-21 VITALS
TEMPERATURE: 97.9 F | DIASTOLIC BLOOD PRESSURE: 83 MMHG | HEIGHT: 71 IN | OXYGEN SATURATION: 99 % | RESPIRATION RATE: 17 BRPM | HEART RATE: 67 BPM | WEIGHT: 184.1 LBS | SYSTOLIC BLOOD PRESSURE: 118 MMHG | BODY MASS INDEX: 25.77 KG/M2

## 2024-01-21 PROBLEM — R19.7 DIARRHEA: Status: ACTIVE | Noted: 2024-01-21

## 2024-01-21 LAB
ALBUMIN SERPL-MCNC: 4.5 G/DL (ref 3.5–5.2)
ALBUMIN/GLOB SERPL: 1.6 G/DL
ALP SERPL-CCNC: 124 U/L (ref 39–117)
ALT SERPL W P-5'-P-CCNC: 24 U/L (ref 1–41)
ANION GAP SERPL CALCULATED.3IONS-SCNC: 11 MMOL/L (ref 5–15)
ANION GAP SERPL CALCULATED.3IONS-SCNC: 13.5 MMOL/L (ref 5–15)
AST SERPL-CCNC: 19 U/L (ref 1–40)
BASOPHILS # BLD AUTO: 0.05 10*3/MM3 (ref 0–0.2)
BASOPHILS NFR BLD AUTO: 0.3 % (ref 0–1.5)
BILIRUB SERPL-MCNC: 0.9 MG/DL (ref 0–1.2)
BUN SERPL-MCNC: 24 MG/DL (ref 8–23)
BUN SERPL-MCNC: 26 MG/DL (ref 8–23)
BUN/CREAT SERPL: 19.7 (ref 7–25)
BUN/CREAT SERPL: 23.8 (ref 7–25)
CALCIUM SPEC-SCNC: 8.4 MG/DL (ref 8.6–10.5)
CALCIUM SPEC-SCNC: 9.3 MG/DL (ref 8.6–10.5)
CHLORIDE SERPL-SCNC: 104 MMOL/L (ref 98–107)
CHLORIDE SERPL-SCNC: 107 MMOL/L (ref 98–107)
CO2 SERPL-SCNC: 22 MMOL/L (ref 22–29)
CO2 SERPL-SCNC: 22.5 MMOL/L (ref 22–29)
CREAT SERPL-MCNC: 1.01 MG/DL (ref 0.76–1.27)
CREAT SERPL-MCNC: 1.32 MG/DL (ref 0.76–1.27)
DEPRECATED RDW RBC AUTO: 39.7 FL (ref 37–54)
DEPRECATED RDW RBC AUTO: 41.2 FL (ref 37–54)
EGFRCR SERPLBLD CKD-EPI 2021: 61.7 ML/MIN/1.73
EGFRCR SERPLBLD CKD-EPI 2021: 85.1 ML/MIN/1.73
EOSINOPHIL # BLD AUTO: 0.12 10*3/MM3 (ref 0–0.4)
EOSINOPHIL NFR BLD AUTO: 0.7 % (ref 0.3–6.2)
ERYTHROCYTE [DISTWIDTH] IN BLOOD BY AUTOMATED COUNT: 12.2 % (ref 12.3–15.4)
ERYTHROCYTE [DISTWIDTH] IN BLOOD BY AUTOMATED COUNT: 12.5 % (ref 12.3–15.4)
GLOBULIN UR ELPH-MCNC: 2.9 GM/DL
GLUCOSE SERPL-MCNC: 115 MG/DL (ref 65–99)
GLUCOSE SERPL-MCNC: 164 MG/DL (ref 65–99)
HCT VFR BLD AUTO: 41.7 % (ref 37.5–51)
HCT VFR BLD AUTO: 47.5 % (ref 37.5–51)
HGB BLD-MCNC: 14.2 G/DL (ref 13–17.7)
HGB BLD-MCNC: 16 G/DL (ref 13–17.7)
HOLD SPECIMEN: NORMAL
HOLD SPECIMEN: NORMAL
IMM GRANULOCYTES # BLD AUTO: 0.09 10*3/MM3 (ref 0–0.05)
IMM GRANULOCYTES NFR BLD AUTO: 0.6 % (ref 0–0.5)
LIPASE SERPL-CCNC: 27 U/L (ref 13–60)
LYMPHOCYTES # BLD AUTO: 1.19 10*3/MM3 (ref 0.7–3.1)
LYMPHOCYTES NFR BLD AUTO: 7.4 % (ref 19.6–45.3)
MAGNESIUM SERPL-MCNC: 2 MG/DL (ref 1.6–2.4)
MCH RBC QN AUTO: 30.1 PG (ref 26.6–33)
MCH RBC QN AUTO: 30.9 PG (ref 26.6–33)
MCHC RBC AUTO-ENTMCNC: 33.7 G/DL (ref 31.5–35.7)
MCHC RBC AUTO-ENTMCNC: 34.1 G/DL (ref 31.5–35.7)
MCV RBC AUTO: 89.5 FL (ref 79–97)
MCV RBC AUTO: 90.7 FL (ref 79–97)
MONOCYTES # BLD AUTO: 0.84 10*3/MM3 (ref 0.1–0.9)
MONOCYTES NFR BLD AUTO: 5.2 % (ref 5–12)
NEUTROPHILS NFR BLD AUTO: 13.78 10*3/MM3 (ref 1.7–7)
NEUTROPHILS NFR BLD AUTO: 85.8 % (ref 42.7–76)
NRBC BLD AUTO-RTO: 0 /100 WBC (ref 0–0.2)
PLATELET # BLD AUTO: 201 10*3/MM3 (ref 140–450)
PLATELET # BLD AUTO: 233 10*3/MM3 (ref 140–450)
PMV BLD AUTO: 9.7 FL (ref 6–12)
PMV BLD AUTO: 9.7 FL (ref 6–12)
POTASSIUM SERPL-SCNC: 3.8 MMOL/L (ref 3.5–5.2)
POTASSIUM SERPL-SCNC: 4.3 MMOL/L (ref 3.5–5.2)
PROT SERPL-MCNC: 7.4 G/DL (ref 6–8.5)
QT INTERVAL: 380 MS
QTC INTERVAL: 419 MS
RBC # BLD AUTO: 4.6 10*6/MM3 (ref 4.14–5.8)
RBC # BLD AUTO: 5.31 10*6/MM3 (ref 4.14–5.8)
SODIUM SERPL-SCNC: 140 MMOL/L (ref 136–145)
SODIUM SERPL-SCNC: 140 MMOL/L (ref 136–145)
T4 FREE SERPL-MCNC: 1.12 NG/DL (ref 0.93–1.7)
TROPONIN T SERPL HS-MCNC: 14 NG/L
TSH SERPL DL<=0.05 MIU/L-ACNC: 1.44 UIU/ML (ref 0.27–4.2)
WBC NRBC COR # BLD AUTO: 16.07 10*3/MM3 (ref 3.4–10.8)
WBC NRBC COR # BLD AUTO: 9.56 10*3/MM3 (ref 3.4–10.8)
WHOLE BLOOD HOLD COAG: NORMAL
WHOLE BLOOD HOLD SPECIMEN: NORMAL

## 2024-01-21 PROCEDURE — 96375 TX/PRO/DX INJ NEW DRUG ADDON: CPT

## 2024-01-21 PROCEDURE — G0378 HOSPITAL OBSERVATION PER HR: HCPCS

## 2024-01-21 PROCEDURE — 74177 CT ABD & PELVIS W/CONTRAST: CPT

## 2024-01-21 PROCEDURE — 25810000003 LACTATED RINGERS SOLUTION: Performed by: PHYSICIAN ASSISTANT

## 2024-01-21 PROCEDURE — 80048 BASIC METABOLIC PNL TOTAL CA: CPT

## 2024-01-21 PROCEDURE — 25510000001 IOPAMIDOL 61 % SOLUTION: Performed by: EMERGENCY MEDICINE

## 2024-01-21 PROCEDURE — 96374 THER/PROPH/DIAG INJ IV PUSH: CPT

## 2024-01-21 PROCEDURE — 25810000003 LACTATED RINGERS PER 1000 ML

## 2024-01-21 PROCEDURE — 63710000001 PROMETHAZINE PER 25 MG: Performed by: PHYSICIAN ASSISTANT

## 2024-01-21 PROCEDURE — 25010000002 MORPHINE PER 10 MG: Performed by: EMERGENCY MEDICINE

## 2024-01-21 PROCEDURE — 85027 COMPLETE CBC AUTOMATED: CPT

## 2024-01-21 RX ORDER — ONDANSETRON 2 MG/ML
4 INJECTION INTRAMUSCULAR; INTRAVENOUS EVERY 6 HOURS PRN
Status: DISCONTINUED | OUTPATIENT
Start: 2024-01-21 | End: 2024-01-21 | Stop reason: HOSPADM

## 2024-01-21 RX ORDER — MORPHINE SULFATE 2 MG/ML
4 INJECTION, SOLUTION INTRAMUSCULAR; INTRAVENOUS ONCE
Status: COMPLETED | OUTPATIENT
Start: 2024-01-21 | End: 2024-01-21

## 2024-01-21 RX ORDER — SODIUM CHLORIDE 9 MG/ML
40 INJECTION, SOLUTION INTRAVENOUS AS NEEDED
Status: DISCONTINUED | OUTPATIENT
Start: 2024-01-21 | End: 2024-01-21 | Stop reason: HOSPADM

## 2024-01-21 RX ORDER — POLYETHYLENE GLYCOL 3350 17 G/17G
17 POWDER, FOR SOLUTION ORAL DAILY PRN
Status: DISCONTINUED | OUTPATIENT
Start: 2024-01-21 | End: 2024-01-21 | Stop reason: HOSPADM

## 2024-01-21 RX ORDER — SODIUM CHLORIDE, SODIUM LACTATE, POTASSIUM CHLORIDE, CALCIUM CHLORIDE 600; 310; 30; 20 MG/100ML; MG/100ML; MG/100ML; MG/100ML
125 INJECTION, SOLUTION INTRAVENOUS CONTINUOUS
Status: DISCONTINUED | OUTPATIENT
Start: 2024-01-21 | End: 2024-01-21 | Stop reason: HOSPADM

## 2024-01-21 RX ORDER — ONDANSETRON 4 MG/1
4 TABLET, ORALLY DISINTEGRATING ORAL EVERY 6 HOURS PRN
Status: DISCONTINUED | OUTPATIENT
Start: 2024-01-21 | End: 2024-01-21 | Stop reason: HOSPADM

## 2024-01-21 RX ORDER — ASPIRIN 81 MG/1
81 TABLET ORAL DAILY
Status: DISCONTINUED | OUTPATIENT
Start: 2024-01-21 | End: 2024-01-21 | Stop reason: HOSPADM

## 2024-01-21 RX ORDER — AMOXICILLIN 250 MG
2 CAPSULE ORAL 2 TIMES DAILY
Status: DISCONTINUED | OUTPATIENT
Start: 2024-01-21 | End: 2024-01-21 | Stop reason: HOSPADM

## 2024-01-21 RX ORDER — BISACODYL 5 MG/1
5 TABLET, DELAYED RELEASE ORAL DAILY PRN
Status: DISCONTINUED | OUTPATIENT
Start: 2024-01-21 | End: 2024-01-21 | Stop reason: HOSPADM

## 2024-01-21 RX ORDER — SODIUM CHLORIDE 0.9 % (FLUSH) 0.9 %
10 SYRINGE (ML) INJECTION AS NEEDED
Status: DISCONTINUED | OUTPATIENT
Start: 2024-01-21 | End: 2024-01-21 | Stop reason: HOSPADM

## 2024-01-21 RX ORDER — SODIUM CHLORIDE 0.9 % (FLUSH) 0.9 %
10 SYRINGE (ML) INJECTION EVERY 12 HOURS SCHEDULED
Status: DISCONTINUED | OUTPATIENT
Start: 2024-01-21 | End: 2024-01-21 | Stop reason: HOSPADM

## 2024-01-21 RX ORDER — FERROUS SULFATE 325(65) MG
325 TABLET ORAL
Status: DISCONTINUED | OUTPATIENT
Start: 2024-01-21 | End: 2024-01-21 | Stop reason: HOSPADM

## 2024-01-21 RX ORDER — NITROGLYCERIN 0.4 MG/1
0.4 TABLET SUBLINGUAL
Status: DISCONTINUED | OUTPATIENT
Start: 2024-01-21 | End: 2024-01-21 | Stop reason: HOSPADM

## 2024-01-21 RX ORDER — PROMETHAZINE HYDROCHLORIDE 25 MG/1
25 TABLET ORAL ONCE
Status: COMPLETED | OUTPATIENT
Start: 2024-01-21 | End: 2024-01-21

## 2024-01-21 RX ORDER — BISACODYL 10 MG
10 SUPPOSITORY, RECTAL RECTAL DAILY PRN
Status: DISCONTINUED | OUTPATIENT
Start: 2024-01-21 | End: 2024-01-21 | Stop reason: HOSPADM

## 2024-01-21 RX ORDER — PANTOPRAZOLE SODIUM 40 MG/10ML
80 INJECTION, POWDER, LYOPHILIZED, FOR SOLUTION INTRAVENOUS ONCE
Status: COMPLETED | OUTPATIENT
Start: 2024-01-21 | End: 2024-01-21

## 2024-01-21 RX ADMIN — IOPAMIDOL 85 ML: 612 INJECTION, SOLUTION INTRAVENOUS at 00:43

## 2024-01-21 RX ADMIN — MORPHINE SULFATE 4 MG: 2 INJECTION, SOLUTION INTRAMUSCULAR; INTRAVENOUS at 04:45

## 2024-01-21 RX ADMIN — ASPIRIN 81 MG: 81 TABLET, COATED ORAL at 09:04

## 2024-01-21 RX ADMIN — Medication 10 ML: at 09:05

## 2024-01-21 RX ADMIN — FERROUS SULFATE TAB 325 MG (65 MG ELEMENTAL FE) 325 MG: 325 (65 FE) TAB at 09:04

## 2024-01-21 RX ADMIN — DOCUSATE SODIUM 50MG AND SENNOSIDES 8.6MG 2 TABLET: 8.6; 5 TABLET, FILM COATED ORAL at 09:04

## 2024-01-21 RX ADMIN — SODIUM CHLORIDE, POTASSIUM CHLORIDE, SODIUM LACTATE AND CALCIUM CHLORIDE 125 ML/HR: 600; 310; 30; 20 INJECTION, SOLUTION INTRAVENOUS at 03:44

## 2024-01-21 RX ADMIN — PANTOPRAZOLE SODIUM 80 MG: 40 INJECTION, POWDER, FOR SOLUTION INTRAVENOUS at 03:37

## 2024-01-21 RX ADMIN — SODIUM CHLORIDE, POTASSIUM CHLORIDE, SODIUM LACTATE AND CALCIUM CHLORIDE 1000 ML: 600; 310; 30; 20 INJECTION, SOLUTION INTRAVENOUS at 00:59

## 2024-01-21 RX ADMIN — PROMETHAZINE HYDROCHLORIDE 25 MG: 25 TABLET ORAL at 01:21

## 2024-01-21 NOTE — NURSING NOTE
Printed discharge instructions and education materials given and discussed with pt. Pt verbalized understanding of instructions and ambulated with RN to exit.

## 2024-01-21 NOTE — PLAN OF CARE
Goal Outcome Evaluation:        Problem: Adult Inpatient Plan of Care  Goal: Plan of Care Review  Outcome: Met  Goal: Patient-Specific Goal (Individualized)  Outcome: Met  Goal: Absence of Hospital-Acquired Illness or Injury  Outcome: Met  Intervention: Identify and Manage Fall Risk  Recent Flowsheet Documentation  Taken 1/21/2024 0830 by Meghan Agustin RN  Safety Promotion/Fall Prevention:   safety round/check completed   room organization consistent   lighting adjusted   nonskid shoes/slippers when out of bed   activity supervised   clutter free environment maintained  Intervention: Prevent Skin Injury  Recent Flowsheet Documentation  Taken 1/21/2024 0830 by Meghan Agustin RN  Body Position: position changed independently  Skin Protection: adhesive use limited  Intervention: Prevent and Manage VTE (Venous Thromboembolism) Risk  Recent Flowsheet Documentation  Taken 1/21/2024 0830 by Meghan Agustin RN  Activity Management:   up ad hung   activity encouraged  Range of Motion: active ROM (range of motion) encouraged  Intervention: Prevent Infection  Recent Flowsheet Documentation  Taken 1/21/2024 0830 by Meghan Agustin RN  Infection Prevention:   rest/sleep promoted   single patient room provided  Goal: Optimal Comfort and Wellbeing  Outcome: Met  Intervention: Provide Person-Centered Care  Recent Flowsheet Documentation  Taken 1/21/2024 0830 by Meghan Agustin RN  Trust Relationship/Rapport:   empathic listening provided   questions answered   care explained   thoughts/feelings acknowledged   reassurance provided  Goal: Readiness for Transition of Care  Outcome: Met     Problem: Pain Acute  Goal: Acceptable Pain Control and Functional Ability  Outcome: Met  Intervention: Prevent or Manage Pain  Recent Flowsheet Documentation  Taken 1/21/2024 0830 by Meghan Agustin RN  Sensory Stimulation Regulation:   care clustered   lighting decreased  Bowel Elimination Promotion:   adequate fluid intake promoted   ambulation promoted    diet adjusted  Sleep/Rest Enhancement: regular sleep/rest pattern promoted  Medication Review/Management: medications reviewed  Intervention: Optimize Psychosocial Wellbeing  Recent Flowsheet Documentation  Taken 1/21/2024 0830 by Meghan Agustin RN  Supportive Measures:   active listening utilized   self-care encouraged  Diversional Activities: television     Problem: Hypertension Comorbidity  Goal: Blood Pressure in Desired Range  Outcome: Met  Intervention: Maintain Blood Pressure Management  Recent Flowsheet Documentation  Taken 1/21/2024 0830 by Meghan Agustin RN  Syncope Management: position changed slowly  Medication Review/Management: medications reviewed

## 2024-01-21 NOTE — H&P
[facility]   HISTORY AND PHYSICAL    Patient Name: Oliverio Corrales  : 1965  MRN: [id number]  Primary Care Physician:  Nathanael Phillips MD  Date of admission: 2024    Subjective   Subjective     Chief Complaint:   Chief Complaint   Patient presents with    Palpitations    Diarrhea         HPI:    Oliverio Corrales is a 58 y.o. male, with a past medical history including, but not limited to, atrial fibrillation, ADHD, GERD, migraine headaches, and pulmonary embolism, presented to the emergency department with a weeklong history of intermittent diarrhea.  He states that it began last weekend and has lasted through the week.  He states that he has had some nausea but denies any vomiting.  He states that he has been taking amoxicillin(self-medicating for the diarrhea) and Phenergan at home but it has not gotten any better.  He has been able to eat and drink some throughout the week but not on a consistent basis.  Came to the emergency department tonight because he began having palpitations.  He denies any blood in his stools.  In the emergency department lab work is unremarkable other than a BUN of 26, creatinine of 132, glucose of 164, alkaline phosphatase 124, WBC 16.07.  Chest x-ray shows no acute findings.  CT abdomen pelvis is suggestive of nonspecific gastroenterocolitis.  Stool panel PCR as well as C. difficile panel are pending.      Review of Systems   All systems were reviewed and negative except for: What was mentioned above in the HPI.    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       Past Surgical History:   Procedure Laterality Date    CHOLECYSTECTOMY      COLONOSCOPY      ENDOSCOPY      FACELIFT N/A 1/29/2021    Procedure: FULL FACE LIFT WITH  FAT TRANSFER;  Surgeon: Roberto Connelly MD;  Location: [facility] MAIN OR;  Service: New Image;  Laterality: N/A;    INGUINAL HERNIA REPAIR      NISSEN FUNDOPLICATION N/A 1/3/2019    Procedure: REDO LAPAROSCOPIC NISSEN FUNDOPLICATION  WITH DAVINCI ROBOT AND MESH;  Surgeon: Qi Beltre MD;  Location: [facility] MAIN OR;  Service: DaVinci    SHOULDER SURGERY Left     TONSILLECTOMY      TRUNK LESION/CYST EXCISION N/A 6/3/2022    Procedure: INCISION AND DRAINAGE INFECTED CYST OF CHEST;  Surgeon: Roberto Connelly MD;  Location: [facility] MAIN OR;  Service: Plastics;  Laterality: N/A;       Family History: family history includes Lung disease in his father. Otherwise pertinent FHx was reviewed and not pertinent to current issue.    Social History:  reports that he has never smoked. He has never used smokeless tobacco. He reports that he does not drink alcohol and does not use drugs.    Home Medications:  Biotin w/ Vitamins C & E, Carbonyl Iron, Minoxidil, aspirin, cyanocobalamin, ferrous gluconate, naproxen, olopatadine, and tobramycin in sterile water (preservative free) injection-balanced salts ophthalmic solution    Allergies:  Allergies   Allergen Reactions    Erythromycin GI Intolerance    Hydrocortisone Nausea Only, Palpitations and Confusion     \"injecting cortisone\", can tolerate skin cream just fine. Patient states he can take it heart races    Cortisone Palpitations    Amoxicillin GI Intolerance     Category: Allergy;     Penicillins Other (See Comments)     PT reports Pustules on head and hair loss     Sulfa Antibiotics GI Intolerance     Category: Allergy;        Objective   Objective     Vitals:   Temp:  [98.6 °F " (37 °C)] 98.6 °F (37 °C)  Heart Rate:  [73-74] 73  Resp:  [18] 18  BP: (129-130)/(86-96) 129/86  Physical Exam    Constitutional: Awake, alert   Eyes: PERRLA, sclerae anicteric, no conjunctival injection   HENT: NCAT, mucous membranes moist   Neck: Supple, no thyromegaly, no lymphadenopathy, trachea midline   Respiratory: Clear to auscultation bilaterally, nonlabored respirations    Cardiovascular: RRR, no murmurs, rubs, or gallops, palpable pedal pulses bilaterally   Gastrointestinal: Hyperactive bowel sounds, epigastric tenderness nondistended   Psychiatric: Appropriate affect, cooperative   Neurologic: Oriented x 3,  speech clear      Result Review    Result Review:  I have personally reviewed the results from the time of this admission to 1/21/2024 03:37 EST and agree with these findings:  [x]  Laboratory list / accordion  []  Microbiology  [x]  Radiology  [x]  EKG/Telemetry   []  Cardiology/Vascular   []  Pathology  [x]  Old records  []  Other:      Assessment & Plan   Assessment / Plan     Brief Patient Summary:  Oliverio Corrales is a 58 y.o. male who was admitted to the observation unit for further evaluation and treatment of his diarrhea, nausea, vomiting, and ESTRELLITA.    Active Hospital Problems:  Active Hospital Problems    Diagnosis     **Diarrhea      Plan:   Diarrhea, nausea, palpitations  -Vital signs every 4 hours  -Cardiac monitoring  -Continuous pulse ox  -LR at 125 mL/h  -Diet, advance as tolerated  -As needed antiemetics  -C. difficile and GI panel PCR pending  -CT abdomen pelvis with contrast shows that the patient's stomach is distended, there are also patulous loops of small bowel as well as liquid stool noted within the ascending and transverse colon.  Appearance suggestive of nonspecific gastroenterocolitis    ESTRELLITA  -Creatinine 1.32, baseline appears to be around 0.8  -LR at 125 mL/h    DVT prophylaxis:  Mechanical DVT prophylaxis orders are present.    CODE STATUS:    Code Status (Patient has no  pulse and is not breathing): CPR (Attempt to Resuscitate)  Medical Interventions (Patient has pulse or is breathing): Full Support    Admission Status:  I believe this patient meets observation status.    78 minutes have been spent by [facility] providers in the care of this patient while under observation status.      Appropriate PPE worn during patient encounter.  Hand hygeine performed before and after seeing the patient.      Electronically signed by YOLI Sr, 01/21/24, 3:37 AM EST.

## 2024-01-21 NOTE — PLAN OF CARE
Goal Outcome Evaluation:      GI panel needed unable to obtain at this time. IVF, c/o abd pain

## 2024-01-21 NOTE — ED PROVIDER NOTES
EMERGENCY DEPARTMENT ENCOUNTER    Room Number:  116/1  PCP: Nathanael Phillips MD    HPI:  Chief Complaint: Abdominal pain, diarrhea, nausea, palpitations  A complete HPI/ROS/PMH/PSH/SH/FH are unobtainable due to: Nothing  Context: Oliverio Corrales is a 58 y.o. male with past medical history of hiatal hernia, GERD, Nissen fundoplication, paroxysmal A-fib, PE, who presents to the ED c/o acute abdominal pain, diarrhea, nausea, palpitations.  Patient states diarrhea has been ongoing for the past  5 days.  Palpitations began earlier today and is said to have been intermittent over the course of the day.  Patient denies chest pain and shortness of breath associated with his palpitations.  His abdominal pain is said to also been intermittent.  It is primarily in the upper abdomen.  It is associated with significant nausea but there is been no active vomiting.  Patient denies hematochezia, melena stools.  He denies fever or chills.  He is currently on ASA.  He is here for further evaluation.      PAST MEDICAL HISTORY  Active Ambulatory Problems     Diagnosis Date Noted    Bilateral pulmonary embolism 01/02/2017    HH (hiatus hernia) 10/30/2018    Hiatal hernia with gastroesophageal reflux disease and esophagitis 01/03/2019    Tachycardia 05/22/2017    Pleuritic chest pain 05/22/2017    Intractable chronic migraine without aura and without status migrainosus 09/08/2016    Hx pulmonary embolism 05/22/2017    Dyspnea 10/08/2018    Complex tear of medial meniscus of left knee as current injury 09/18/2020    Chronic neck pain 08/31/2015    Biceps tendinosis of right shoulder 08/27/2020    ASHD (arteriosclerotic heart disease) 10/08/2018    Atrial fibrillation 10/08/2018    Acquired trigger finger of left middle finger 01/14/2020    Cellulitis 10/04/2023    Acute on chronic colitis 05/26/2022    Lymphadenitis 11/30/2015    Attention deficit hyperactivity disorder 05/26/2022    Cubital tunnel syndrome 03/28/2017    Hearing loss  10/16/2018    Inguinal hernia 01/18/2017    Injury of tendon of rotator cuff 09/25/2023    Macular degeneration 10/04/2023    Nontraumatic rupture of long head of biceps tendon of left shoulder 10/03/2023    Osteoarthritis of left glenohumeral joint 10/03/2023    Other specified postprocedural states 12/04/2014    Peyronie's disease 08/03/2020    Thrombosis of superficial vein of penis 02/04/2021    Sleep apnea 02/27/2017    Rotator cuff syndrome 09/02/2015    Coronary arteriosclerosis 10/08/2018    Pulmonary embolism 01/02/2017    Gastroesophageal reflux disease 10/08/2018    Personal history of pulmonary embolism 05/22/2017    Intractable chronic migraine without aura 09/08/2016    Pleuritic pain 05/22/2017    H/O cosmetic surgery- facelift 2022 10/04/2023    History of Nissen fundoplication 10/04/2023     Resolved Ambulatory Problems     Diagnosis Date Noted    No Resolved Ambulatory Problems     Past Medical History:   Diagnosis Date    GERD (gastroesophageal reflux disease)     Reflux esophagitis          PAST SURGICAL HISTORY  Past Surgical History:   Procedure Laterality Date    CHOLECYSTECTOMY      COLONOSCOPY      ENDOSCOPY      FACELIFT N/A 1/29/2021    Procedure: FULL FACE LIFT WITH  FAT TRANSFER;  Surgeon: Roberto Connelly MD;  Location: [facility] OR;  Service: New Image;  Laterality: N/A;    INGUINAL HERNIA REPAIR      NISSEN FUNDOPLICATION N/A 1/3/2019    Procedure: REDO LAPAROSCOPIC NISSEN FUNDOPLICATION  WITH DAVINCI ROBOT AND MESH;  Surgeon: Qi Beltre MD;  Location: [facility] MAIN OR;  Service: DaVinci    SHOULDER SURGERY Left     TONSILLECTOMY      TRUNK LESION/CYST EXCISION N/A 6/3/2022    Procedure: INCISION AND DRAINAGE INFECTED CYST OF CHEST;  Surgeon: Roberto Connelly MD;  Location: [facility] MAIN OR;  Service: Plastics;  Laterality: N/A;         FAMILY HISTORY  Family History   Problem Relation Age of Onset    Lung disease Father     Malig Hyperthermia Neg Hx          SOCIAL HISTORY  Social  History     Socioeconomic History    Marital status: Single   Tobacco Use    Smoking status: Never    Smokeless tobacco: Never   Vaping Use    Vaping Use: Never used   Substance and Sexual Activity    Alcohol use: No    Drug use: No    Sexual activity: Defer     Birth control/protection: None         ALLERGIES  Erythromycin, Hydrocortisone, Cortisone, Amoxicillin, Penicillins, and Sulfa antibiotics        REVIEW OF SYSTEMS  Review of Systems     Included in HPI  All systems reviewed and negative except for those discussed in HPI.       PHYSICAL EXAM  ED Triage Vitals   Temp Heart Rate Resp BP SpO2   01/20/24 2315 01/20/24 2315 01/20/24 2315 01/20/24 2318 01/20/24 2315   98.6 °F (37 °C) 74 18 130/96 95 %      Temp src Heart Rate Source Patient Position BP Location FiO2 (%)   01/20/24 2315 01/20/24 2315 01/20/24 2318 01/20/24 2318 --   Tympanic Monitor Lying Right arm        Physical Exam      GENERAL: Ill-appearing, not distressed  HENT: nares patent  EYES: no scleral icterus  CV: regular rhythm, normal rate  RESPIRATORY: normal effort  ABDOMEN: TTP diffusely/NT upper abdomen with no guarding, no rebound, no mass noted  MUSCULOSKELETAL: no deformity  NEURO: alert, moves all extremities, follows commands  PSYCH:  calm, cooperative  SKIN: warm, dry    Vital signs and nursing notes reviewed.          LAB RESULTS  Recent Results (from the past 24 hour(s))   ECG 12 Lead ED Triage Standing Order; Chest Pain    Collection Time: 01/20/24 11:18 PM   Result Value Ref Range    QT Interval 380 ms    QTC Interval 419 ms   Comprehensive Metabolic Panel    Collection Time: 01/20/24 11:55 PM    Specimen: Blood   Result Value Ref Range    Glucose 164 (H) 65 - 99 mg/dL    BUN 26 (H) 8 - 23 mg/dL    Creatinine 1.32 (H) 0.76 - 1.27 mg/dL    Sodium 140 136 - 145 mmol/L    Potassium 4.3 3.5 - 5.2 mmol/L    Chloride 104 98 - 107 mmol/L    CO2 22.5 22.0 - 29.0 mmol/L    Calcium 9.3 8.6 - 10.5 mg/dL    Total Protein 7.4 6.0 - 8.5 g/dL     Albumin 4.5 3.5 - 5.2 g/dL    ALT (SGPT) 24 1 - 41 U/L    AST (SGOT) 19 1 - 40 U/L    Alkaline Phosphatase 124 (H) 39 - 117 U/L    Total Bilirubin 0.9 0.0 - 1.2 mg/dL    Globulin 2.9 gm/dL    A/G Ratio 1.6 g/dL    BUN/Creatinine Ratio 19.7 7.0 - 25.0    Anion Gap 13.5 5.0 - 15.0 mmol/L    eGFR 61.7 >60.0 mL/min/1.73   High Sensitivity Troponin T    Collection Time: 01/20/24 11:55 PM    Specimen: Blood   Result Value Ref Range    HS Troponin T 14 <22 ng/L   Green Top (Gel)    Collection Time: 01/20/24 11:55 PM   Result Value Ref Range    Extra Tube Hold for add-ons.    Lavender Top    Collection Time: 01/20/24 11:55 PM   Result Value Ref Range    Extra Tube hold for add-on    Gold Top - SST    Collection Time: 01/20/24 11:55 PM   Result Value Ref Range    Extra Tube Hold for add-ons.    Light Blue Top    Collection Time: 01/20/24 11:55 PM   Result Value Ref Range    Extra Tube Hold for add-ons.    CBC Auto Differential    Collection Time: 01/20/24 11:55 PM    Specimen: Blood   Result Value Ref Range    WBC 16.07 (H) 3.40 - 10.80 10*3/mm3    RBC 5.31 4.14 - 5.80 10*6/mm3    Hemoglobin 16.0 13.0 - 17.7 g/dL    Hematocrit 47.5 37.5 - 51.0 %    MCV 89.5 79.0 - 97.0 fL    MCH 30.1 26.6 - 33.0 pg    MCHC 33.7 31.5 - 35.7 g/dL    RDW 12.2 (L) 12.3 - 15.4 %    RDW-SD 39.7 37.0 - 54.0 fl    MPV 9.7 6.0 - 12.0 fL    Platelets 233 140 - 450 10*3/mm3    Neutrophil % 85.8 (H) 42.7 - 76.0 %    Lymphocyte % 7.4 (L) 19.6 - 45.3 %    Monocyte % 5.2 5.0 - 12.0 %    Eosinophil % 0.7 0.3 - 6.2 %    Basophil % 0.3 0.0 - 1.5 %    Immature Grans % 0.6 (H) 0.0 - 0.5 %    Neutrophils, Absolute 13.78 (H) 1.70 - 7.00 10*3/mm3    Lymphocytes, Absolute 1.19 0.70 - 3.10 10*3/mm3    Monocytes, Absolute 0.84 0.10 - 0.90 10*3/mm3    Eosinophils, Absolute 0.12 0.00 - 0.40 10*3/mm3    Basophils, Absolute 0.05 0.00 - 0.20 10*3/mm3    Immature Grans, Absolute 0.09 (H) 0.00 - 0.05 10*3/mm3    nRBC 0.0 0.0 - 0.2 /100 WBC   Lipase    Collection Time:  01/20/24 11:55 PM    Specimen: Blood   Result Value Ref Range    Lipase 27 13 - 60 U/L   Magnesium    Collection Time: 01/20/24 11:55 PM    Specimen: Blood   Result Value Ref Range    Magnesium 2.0 1.6 - 2.4 mg/dL   TSH    Collection Time: 01/20/24 11:55 PM    Specimen: Blood   Result Value Ref Range    TSH 1.440 0.270 - 4.200 uIU/mL   T4, Free    Collection Time: 01/20/24 11:55 PM    Specimen: Blood   Result Value Ref Range    Free T4 1.12 0.93 - 1.70 ng/dL       Ordered the above labs and reviewed the results.        RADIOLOGY  CT Abdomen Pelvis With Contrast    Result Date: 1/21/2024  CT OF THE ABDOMEN PELVIS WITH CONTRAST  HISTORY: Diarrhea  COMPARISON: July 2, 2021  TECHNIQUE: Axial CT imaging was obtained through the abdomen and pelvis. IV contrast was administered.  FINDINGS: Images through the lung bases demonstrate some scarring. Tiny cyst is noted within the right hepatic lobe. Gallbladder is absent. Patient's stomach appears distended. Duodenum is also patulous. The pancreas, spleen, and adrenal glands are normal. The kidneys enhance symmetrically. No hydronephrosis is seen. There are simple appearing bilateral renal cysts. No additional follow-up is necessary. No distal ureteral or bladder stones are identified. There are aortoiliac calcifications. Prostate gland contains dystrophic calcifications. There is mild colonic diverticulosis. The patient is noted to have liquid stool within the ascending and transverse colon, as well as multiple patulous loops of small bowel. No pneumatosis or free air is seen. Bilateral pars defects are again noted at L5-S1, with associated mild spondylolisthesis. Right common iliac artery measures up to 1.7 cm distally. This is stable. There is also some ectasia of the left common iliac artery, also unchanged.      The patient's stomach appears distended. There are also patulous loops of small bowel, as well as liquid stool noted within the ascending and transverse colon.  Appearance suggests nonspecific gastroenterocolitis.  Radiation dose reduction techniques were utilized, including automated exposure control and exposure modulation based on body size.   This report was finalized on 1/21/2024 1:01 AM by Dr. Mickie Estes M.D on Workstation: [employer]      XR Chest 2 View    Result Date: 1/20/2024  PA AND LATERAL CHEST RADIOGRAPH  HISTORY: Chest pain  COMPARISON: December 29, 2022  FINDINGS: There is cardiomegaly. There is no vascular congestion. No pneumothorax, pleural effusion, or acute infiltrate is seen. There is some gaseous distention of bowel within the upper abdomen, similar to the prior study.      No acute findings.  This report was finalized on 1/20/2024 11:51 PM by Dr. Micike Estes M.D on Workstation: [employer]       Ordered the above noted radiological studies. Reviewed by me in PACS.        MEDICATIONS GIVEN IN ER  Medications   sodium chloride 0.9 % flush 10 mL (has no administration in time range)   sodium chloride 0.9 % flush 10 mL (has no administration in time range)   sodium chloride 0.9 % flush 10 mL (has no administration in time range)   sodium chloride 0.9 % infusion 40 mL (has no administration in time range)   sennosides-docusate (PERICOLACE) 8.6-50 MG per tablet 2 tablet (has no administration in time range)     And   polyethylene glycol (MIRALAX) packet 17 g (has no administration in time range)     And   bisacodyl (DULCOLAX) EC tablet 5 mg (has no administration in time range)     And   bisacodyl (DULCOLAX) suppository 10 mg (has no administration in time range)   ondansetron ODT (ZOFRAN-ODT) disintegrating tablet 4 mg (has no administration in time range)     Or   ondansetron (ZOFRAN) injection 4 mg (has no administration in time range)   nitroglycerin (NITROSTAT) SL tablet 0.4 mg (has no administration in time range)   lactated ringers infusion (125 mL/hr Intravenous New Bag 1/21/24 3037)   aspirin tablet 325 mg (325 mg Oral Given  1/20/24 2340)   iopamidol (ISOVUE-300) 61 % injection 85 mL (85 mL Intravenous Given 1/21/24 0043)   lactated ringers bolus 1,000 mL (0 mL Intravenous Stopped 1/21/24 0129)   promethazine (PHENERGAN) tablet 25 mg (25 mg Oral Given 1/21/24 0121)   pantoprazole (PROTONIX) injection 80 mg (80 mg Intravenous Given 1/21/24 0337)   morphine injection 4 mg (4 mg Intravenous Given 1/21/24 0445)         ORDERS PLACED DURING THIS VISIT:  Orders Placed This Encounter   Procedures    Clostridioides difficile Toxin - Stool, Per Rectum    Gastrointestinal Panel, PCR - Stool, Per Rectum    Clostridioides difficile Toxin, PCR - Stool, Per Rectum    XR Chest 2 View    CT Abdomen Pelvis With Contrast    [place] Draw    Comprehensive Metabolic Panel    High Sensitivity Troponin T    CBC Auto Differential    Lipase    Magnesium    TSH    T4, Free    CBC (No Diff)    Basic Metabolic Panel    Diet: Liquid Diets; Clear Liquid; Fluid Consistency: Thin (IDDSI 0)    Undress & Gown    Assist With Feeding Patient    Intake & Output    Weigh Patient    Oral Care    Place Sequential Compression Device    Maintain Sequential Compression Device    Telemetry - Maintain IV Access    Telemetry - Place Orders & Notify Provider of Results When Patient Experiences Acute Chest Pain, Dysrhythmia or Respiratory Distress    May Be Off Telemetry for Tests    Vital Signs    Pulse Oximetry, Continuous    Up With Assistance    Code Status and Medical Interventions:    Patient Isolation Contact Spore    Oxygen Therapy- Nasal Cannula; Titrate 1-6 LPM Per SpO2; 90 - 95%    ECG 12 Lead ED Triage Standing Order; Chest Pain    ECG 12 Lead ED Triage Standing Order; Chest Pain    Insert Peripheral IV    Insert Peripheral IV    Initiate ED Observation Status    CBC & Differential    Green Top (Gel)    Lavender Top    Gold Top - SST    Light Blue Top         OUTPATIENT MEDICATION MANAGEMENT:  Current Facility-Administered Medications Ordered in Epic   Medication Dose  Route Frequency Provider Last Rate Last Admin    sennosides-docusate (PERICOLACE) 8.6-50 MG per tablet 2 tablet  2 tablet Oral BID Gordon, Denisse S APRN        And    polyethylene glycol (MIRALAX) packet 17 g  17 g Oral Daily PRN Gordon, Denisse S, APRN        And    bisacodyl (DULCOLAX) EC tablet 5 mg  5 mg Oral Daily PRN Gordon, Denisse S, APRN        And    bisacodyl (DULCOLAX) suppository 10 mg  10 mg Rectal Daily PRN Gordon, Denisse S, APRN        lactated ringers infusion  125 mL/hr Intravenous Continuous Gordon, Denisse S, APRN 125 mL/hr at 01/21/24 0344 125 mL/hr at 01/21/24 0344    nitroglycerin (NITROSTAT) SL tablet 0.4 mg  0.4 mg Sublingual Q5 Min PRN Gordon, Denisse S, APRN        ondansetron ODT (ZOFRAN-ODT) disintegrating tablet 4 mg  4 mg Oral Q6H PRN Gordon, Denisse S, APRN        Or    ondansetron (ZOFRAN) injection 4 mg  4 mg Intravenous Q6H PRN Gordon, Denisse S, APRN        sodium chloride 0.9 % flush 10 mL  10 mL Intravenous PRN Emergency, Triage Protocol, MD        sodium chloride 0.9 % flush 10 mL  10 mL Intravenous Q12H Gordon, Denisse S, APRN        sodium chloride 0.9 % flush 10 mL  10 mL Intravenous PRN Gordon, Denisse S, APRN        sodium chloride 0.9 % infusion 40 mL  40 mL Intravenous PRN Gordon, Denisse S, APRN         No current [facility]-ordered outpatient medications on file.       PROCEDURES  Procedures          MEDICAL DECISION MAKING, PROGRESS, and CONSULTS    Discussion below represents my analysis of pertinent findings related to patient's condition, differential diagnosis, treatment plan and final disposition.      Additional sources:  - Discussed/obtained information from independent historians: None available  Additional information was obtained to confirm the patient's history.    - External (non-ED) record review: Patient had been admitted to the hospital last October for cellulitis.  Reviewing a discharge note from Dr. Scott on 10/6/2023.  Patient was noted to have an abscess behind his left ear.  ENT  was consulted, blood cultures obtained, patient was treated with antibiotics.  I&D was performed.  Patient improved and was discharged on 10/6/2023    Prescription drug monitoring program review:           Chronic or social conditions impacting care: None        Differential diagnosis:    Gastritis, duodenitis, viral GE, GERD, gastroparesis, SBO, partial SBO, acute pancreatitis, ACS, accessible A-fib, paroxysmal a flutter, SVT, PVCs, PACs, AV block, other ventricular arrhythmia.  Will obtain a GI workup, CT abdomen pelvis, cardiac workup, and check electrolytes given patient's complaints.           Independent interpretation of labs, radiology studies, and discussions with consultants:  ED Course as of 01/21/24 0636   Sat Jan 20, 2024   2343 BP: 130/96 [RC]   2343 Temp: 98.6 °F (37 °C) [RC]   2343 Heart Rate: 74 [RC]   2343 Resp: 18 [RC]   2343 SpO2: 95 %  RA [RC]   2350 My differential diagnosis for palpitations includes but is not limited to    Arrhythmias  Atrial fibrillation/flutter  Bradycardia caused by advanced arteriovenous  block or sinus node dysfunction  Bradycardia-tachycardia syndrome (sick sinus syndrome)  Multifocal atrial tachycardia  Premature supraventricular or ventricular contractions  Sinus tachycardia or arrhythmia  Supraventricular tachycardia  Ventricular tachycardia  Carmen-Parkinson-White syndrome     Psychiatric causes  Anxiety disorder  Panic attacks  Drugs and medications  Alcohol  Caffeine  Certain prescription and over-the-counter agents (e.g., digitalis, phenothiazine, theophylline, beta agonists)  Street drugs (e.g., cocaine)  Tobacco    Nonarrhythmic cardiac causes  Atrial or ventricular septal defect  Cardiomyopathy  Congenital heart disease  Congestive heart failure  Mitral valve prolapse  Pacemaker-mediated tachycardia  Pericarditis  Valvular disease (e.g., aortic insufficiency, stenosis)    Extracardiac causes  Anemia  Electrolyte  imbalance  Fever  Hyperthyroidism  Hypoglycemia  Hypovolemia  Pheochromocytoma  Pulmonary disease  Vasovagal syndrome          [JG]   2351 I reviewed chest 2 view x-ray in PACS, no pulmonary infiltrates per my read. [JG]   2356 EKG independently viewed and contemporaneously interpreted by ED physician. Time: 11:18 PM.  Rate 73.  Interpretation: Normal sinus rhythm, normal axis, normal QRS, no acute ST changes. [JG]   Sun Jan 21, 2024   0110 I reviewed CT abdomen pelvis in PACS, no bowel obstruction per my read. [JG]   0204 Glucose(!): 164 [RC]   0204 BUN(!): 26 [RC]   0204 Creatinine(!): 1.32 [RC]   0204 Sodium: 140 [RC]   0204 Potassium: 4.3 [RC]   0204 CO2: 22.5 [RC]   0204 Anion Gap: 13.5 [RC]   0347 Patient was orally challenged without any vomiting.  He was then reassessed and states  he is still feeling quite nauseated, continues experiencing discomfort in his upper abdomen, and is generally not feeling any better than when he arrived..  He has had 8 mg of Zofran, 25 mg of Phenergan, and Protonix while in the ED.  Will order 4 mg of morphine IVP and place call to the observation unit to discuss 24-hour observation with continued IVF and wear stool cultures can be obtained.   [RC]   0351 Discussed the patient's case with DELIA Leyva in the observation unit.  Observation unit will accept the patient at this time. [RC]      ED Course User Index  [JG] Panfilo Arias MD  [RC] Richard Azevedo III, PA                 DIAGNOSIS  Final diagnoses:   Diarrhea, unspecified type   Nausea   Palpitations   ESTRELLITA (acute kidney injury)   Dehydration         DISPOSITION  Admission to the observation unit      Latest Documented Vital Signs:  As of 06:36 EST  BP- 122/86 HR- 67 Temp- 97.7 °F (36.5 °C) (Oral) O2 sat- 95%      --    Please note that portions of this were completed with a voice recognition program.       Note Disclaimer: At [facility], we believe that sharing information builds trust and better  relationships. You are receiving this note because you are receiving care at [facility] or recently visited. It is possible you will see health information before a provider has talked with you about it. This kind of information can be easy to misunderstand. To help you fully understand what it means for your health, we urge you to discuss this note with your provider.         Richard Azevedo III, PA  01/21/24 0636

## 2024-01-21 NOTE — ED PROVIDER NOTES
MD ATTESTATION NOTE  I supervised care provided by the midlevel provider. We have discussed this patient's history, physical exam, and treatment plan. I have reviewed the midlevel provider's note and I agree with the midlevel provider's findings and plan of care.   SHARED VISIT: This visit was performed by BOTH a physician and an APC. The substantive portion of the medical decision making was performed by this attesting physician who made or approved the management plan and takes responsibility for patient management. All studies in the APC note (if performed) were independently interpreted by me.   I have personally had a face to face encounter with the patient.   See my attending note.    PCP: Nathanael Phillips MD  Patient Care Team:  Nathanael Phillips MD as PCP - General (Nurse Practitioner)     Oliverio Corrales is a 58 y.o. male who presents to the ED c/o palpitations.  Patient reports that he initially had diarrhea about a week ago, short-lived, resolved and then began having diarrhea today.  Patient reports numerous episodes of diarrhea today, over 10 episodes, no blood or mucus.  Patient reports that he was having abdominal pain, crampy, generalized, reports abdominal pain resolved at present.  Patient reports nausea with 1 episode of emesis, emesis nonbloody nonbilious.  Patient reports diarrhea had no blood or mucus.  No urinary symptoms.  Patient does report that he has had a cough, nonproductive in nature.  No chest pain or shortness of breath, no fevers.  Patient reports that he was on the toilet when he felt lightheaded, felt his heart racing, denied skipping beats.  Patient denied any syncope, no fall or trauma.    On exam:  General: NAD.  Head: NCAT.  ENT: nares patent, no scleral icterus  Neck: Supple, trachea midline.  Cardiac: regular rate and rhythm.  Lungs: normal effort, clear to auscultation bilaterally  Abdomen: Soft, nondistended, NTTP, no rebound tenderness, no guarding or rigidity.    Extremities: Moves all extremities well, no peripheral edema  Neuro: alert, MAEW, follows commands  Psych: calm, cooperative  Skin: Warm, dry.    Medical Decision Making:  After the initial H&P, I discussed pertinent information from history and physical exam with patient/family.  Discussed differential diagnosis.  Discussed plan for ED evaluation/work-up/treatment.  All questions answered.  Patient/family is agreeable with plan.    ED Course as of 01/21/24 0430   Sat Jan 20, 2024   2343 BP: 130/96 [RC]   2343 Temp: 98.6 °F (37 °C) [RC]   2343 Heart Rate: 74 [RC]   2343 Resp: 18 [RC]   2343 SpO2: 95 %  RA [RC]   2350 My differential diagnosis for palpitations includes but is not limited to    Arrhythmias  Atrial fibrillation/flutter  Bradycardia caused by advanced arteriovenous  block or sinus node dysfunction  Bradycardia-tachycardia syndrome (sick sinus syndrome)  Multifocal atrial tachycardia  Premature supraventricular or ventricular contractions  Sinus tachycardia or arrhythmia  Supraventricular tachycardia  Ventricular tachycardia  Carmen-Parkinson-White syndrome     Psychiatric causes  Anxiety disorder  Panic attacks  Drugs and medications  Alcohol  Caffeine  Certain prescription and over-the-counter agents (e.g., digitalis, phenothiazine, theophylline, beta agonists)  Street drugs (e.g., cocaine)  Tobacco    Nonarrhythmic cardiac causes  Atrial or ventricular septal defect  Cardiomyopathy  Congenital heart disease  Congestive heart failure  Mitral valve prolapse  Pacemaker-mediated tachycardia  Pericarditis  Valvular disease (e.g., aortic insufficiency, stenosis)    Extracardiac causes  Anemia  Electrolyte imbalance  Fever  Hyperthyroidism  Hypoglycemia  Hypovolemia  Pheochromocytoma  Pulmonary disease  Vasovagal syndrome          [JG]   2351 I reviewed chest 2 view x-ray in PACS, no pulmonary infiltrates per my read. [JG]   2352 EKG independently viewed and contemporaneously interpreted by ED physician.  Time: 11:18 PM.  Rate 73.  Interpretation: Normal sinus rhythm, normal axis, normal QRS, no acute ST changes. [JG]   Sun Jan 21, 2024   0110 I reviewed CT abdomen pelvis in PACS, no bowel obstruction per my read. [JG]   0204 Glucose(!): 164 [RC]   0204 BUN(!): 26 [RC]   0204 Creatinine(!): 1.32 [RC]   0204 Sodium: 140 [RC]   0204 Potassium: 4.3 [RC]   0204 CO2: 22.5 [RC]   0204 Anion Gap: 13.5 [RC]   0347 Patient was orally challenged without any vomiting.  He was then reassessed and states  he is still feeling quite nauseated, continues experiencing discomfort in his upper abdomen, and is generally not feeling any better than when he arrived..  He has had 8 mg of Zofran, 25 mg of Phenergan, and Protonix while in the ED.  Will order 4 mg of morphine IVP and place call to the observation unit to discuss 24-hour observation with continued IVF and wear stool cultures can be obtained.   [RC]   0351 Discussed the patient's case with DELIA Leyva in the observation unit.  Observation unit will accept the patient at this time. [RC]      ED Course User Index  [JG] Panfilo Arias MD  [RC] Richard Azevedo III, PA       Diagnosis  Final diagnoses:   Diarrhea, unspecified type   Nausea   Palpitations   ESTRELLITA (acute kidney injury)   Dehydration          Panfilo Arias MD  01/21/24 4569

## 2024-01-21 NOTE — PROGRESS NOTES
ED OBSERVATION PROGRESS/DISCHARGE SUMMARY    Date of Admission: 1/20/2024   LOS: 0 days   PCP: Nathanael Phillips MD    Final Diagnosis Diarrhea      Subjective     Hospital Outcome:     Patient is an afebrile ambulatory nontoxic-appearing 58-year-old gentleman admitted to the ED observation unit for abdominal pain, nausea and diarrhea.  He had a leukocytosis of 16 yesterday and a CT abdomen pelvis which showed findings consistent with nonspecific gastro enterocolitis.    He has been able to advance his diet from clear liquids and during his 12-hour stay has not had an episode of diarrhea and therefore not able to give stool specimens for PCR/C. difficile testing.  He denies any fevers or chills.  Abdomen is soft and nontender this morning.  Agreeable to discharge home with outpatient PCP follow-up.    ROS:  General: no fevers, chills  Respiratory: no cough, dyspnea  Cardiovascular: no chest pain, palpitations  Abdomen: No abdominal pain, nausea, vomiting, or diarrhea  Neurologic: No focal weakness    Objective   Physical Exam:  I have reviewed the vital signs.  Temp:  [97.7 °F (36.5 °C)-98.6 °F (37 °C)] 97.9 °F (36.6 °C)  Heart Rate:  [62-75] 62  Resp:  [16-18] 18  BP: (117-130)/(83-96) 124/90  General Appearance:    Alert, cooperative, no distress  Head:    Normocephalic, atraumatic  Eyes:    Sclerae anicteric  Neck:   Supple, no mass  Lungs: Clear to auscultation bilaterally, respirations unlabored  Heart: Regular rate and rhythm, S1 and S2 normal, no murmur, rub or gallop  Abdomen:  Soft, non-tender, bowel sounds active, nondistended  Extremities: No clubbing, cyanosis, or edema to lower extremities  Pulses:  2+ and symmetric in distal lower extremities  Skin: No rashes   Psychiatric: Flat affect, nonsuicidal  Neurologic: Oriented x3, Normal strength to extremities    Results Review:    I have reviewed the labs, radiology results and diagnostic studies.    Results from last 7 days   Lab Units 01/21/24  2797    WBC 10*3/mm3 9.56   HEMOGLOBIN g/dL 14.2   HEMATOCRIT % 41.7   PLATELETS 10*3/mm3 201     Results from last 7 days   Lab Units 01/21/24  0750 01/20/24  2355   SODIUM mmol/L 140 140   POTASSIUM mmol/L 3.8 4.3   CHLORIDE mmol/L 107 104   CO2 mmol/L 22.0 22.5   BUN mg/dL 24* 26*   CREATININE mg/dL 1.01 1.32*   CALCIUM mg/dL 8.4* 9.3   BILIRUBIN mg/dL  --  0.9   ALK PHOS U/L  --  124*   ALT (SGPT) U/L  --  24   AST (SGOT) U/L  --  19   GLUCOSE mg/dL 115* 164*     Imaging Results (Last 24 Hours)       Procedure Component Value Units Date/Time    CT Abdomen Pelvis With Contrast [[id number]] Collected: 01/21/24 0054     Updated: 01/21/24 0104    Narrative:      CT OF THE ABDOMEN PELVIS WITH CONTRAST     HISTORY: Diarrhea     COMPARISON: July 2, 2021     TECHNIQUE: Axial CT imaging was obtained through the abdomen and pelvis.  IV contrast was administered.     FINDINGS:  Images through the lung bases demonstrate some scarring. Tiny cyst is  noted within the right hepatic lobe. Gallbladder is absent. Patient's  stomach appears distended. Duodenum is also patulous. The pancreas,  spleen, and adrenal glands are normal. The kidneys enhance  symmetrically. No hydronephrosis is seen. There are simple appearing  bilateral renal cysts. No additional follow-up is necessary. No distal  ureteral or bladder stones are identified. There are aortoiliac  calcifications. Prostate gland contains dystrophic calcifications. There  is mild colonic diverticulosis. The patient is noted to have liquid  stool within the ascending and transverse colon, as well as multiple  patulous loops of small bowel. No pneumatosis or free air is seen.  Bilateral pars defects are again noted at L5-S1, with associated mild  spondylolisthesis. Right common iliac artery measures up to 1.7 cm  distally. This is stable. There is also some ectasia of the left common  iliac artery, also unchanged.       Impression:      The patient's stomach appears distended. There  are also patulous loops  of small bowel, as well as liquid stool noted within the ascending and  transverse colon. Appearance suggests nonspecific gastroenterocolitis.     Radiation dose reduction techniques were utilized, including automated  exposure control and exposure modulation based on body size.        This report was finalized on 1/21/2024 1:01 AM by Dr. Mickie Estes M.D on Workstation: [id number]       XR Chest 2 View [[id number]] Collected: 01/20/24 2350     Updated: 01/20/24 2354    Narrative:      PA AND LATERAL CHEST RADIOGRAPH     HISTORY: Chest pain     COMPARISON: December 29, 2022     FINDINGS:  There is cardiomegaly. There is no vascular congestion. No pneumothorax,  pleural effusion, or acute infiltrate is seen. There is some gaseous  distention of bowel within the upper abdomen, similar to the prior  study.       Impression:      No acute findings.     This report was finalized on 1/20/2024 11:51 PM by Dr. Mickie Estes M.D on Workstation: [id number]               I have reviewed the medications.  ---------------------------------------------------------------------------------------------  Assessment & Plan   Assessment/Problem List    Diarrhea      Plan:    Diarrhea, nausea, palpitations  -Cardiac monitoring overnight uneventful  -C. difficile and GI panel PCR not sent due to patient's inability to provide a stool specimen  -CT abdomen pelvis with contrast shows that the patient's stomach is distended, there are also patulous loops of small bowel as well as liquid stool noted within the ascending and transverse colon.  Appearance suggestive of nonspecific gastroenterocolitis     ESTRELLITA  -Resolved following lactated Ringer's at 125 mL/h overnight    Disposition: Home    Follow-up after Discharge: PCP    This note will serve as a discharge summary.    Christi Demarco, APRN 01/21/24 08:30 EST    I have worn appropriate PPE during this patient encounter, sanitized my hands both with  entering and exiting patient's room.      40 minutes has been spent by [facility] providers in the care of this patient while under observation status

## 2024-05-06 ENCOUNTER — HOSPITAL ENCOUNTER (EMERGENCY)
Facility: HOSPITAL | Age: 59
Discharge: HOME | End: 2024-05-06
Attending: EMERGENCY MEDICINE | Admitting: EMERGENCY MEDICINE
Payer: MEDICAID

## 2024-05-06 ENCOUNTER — APPOINTMENT (OUTPATIENT)
Dept: CT IMAGING | Facility: HOSPITAL | Age: 59
End: 2024-05-06
Payer: MEDICAID

## 2024-05-06 ENCOUNTER — APPOINTMENT (OUTPATIENT)
Dept: GENERAL RADIOLOGY | Facility: HOSPITAL | Age: 59
End: 2024-05-06
Payer: MEDICAID

## 2024-05-06 VITALS
WEIGHT: 190 LBS | HEIGHT: 71 IN | SYSTOLIC BLOOD PRESSURE: 132 MMHG | DIASTOLIC BLOOD PRESSURE: 88 MMHG | TEMPERATURE: 97.9 F | OXYGEN SATURATION: 98 % | BODY MASS INDEX: 26.6 KG/M2 | HEART RATE: 74 BPM | RESPIRATION RATE: 18 BRPM

## 2024-05-06 DIAGNOSIS — R04.2 HEMOPTYSIS: ICD-10-CM

## 2024-05-06 DIAGNOSIS — R05.2 SUBACUTE COUGH: Primary | ICD-10-CM

## 2024-05-06 LAB
ALBUMIN SERPL-MCNC: 3.8 G/DL (ref 3.5–5.2)
ALBUMIN/GLOB SERPL: 1.4 G/DL
ALP SERPL-CCNC: 125 U/L (ref 39–117)
ALT SERPL W P-5'-P-CCNC: 20 U/L (ref 1–41)
ANION GAP SERPL CALCULATED.3IONS-SCNC: 10.4 MMOL/L (ref 5–15)
AST SERPL-CCNC: 18 U/L (ref 1–40)
BASOPHILS # BLD AUTO: 0.08 10*3/MM3 (ref 0–0.2)
BASOPHILS NFR BLD AUTO: 0.9 % (ref 0–1.5)
BILIRUB SERPL-MCNC: 0.6 MG/DL (ref 0–1.2)
BUN SERPL-MCNC: 21 MG/DL (ref 8–23)
BUN/CREAT SERPL: 21.2 (ref 7–25)
CALCIUM SPEC-SCNC: 8.9 MG/DL (ref 8.6–10.5)
CHLORIDE SERPL-SCNC: 105 MMOL/L (ref 98–107)
CO2 SERPL-SCNC: 22.6 MMOL/L (ref 22–29)
CREAT SERPL-MCNC: 0.99 MG/DL (ref 0.76–1.27)
DEPRECATED RDW RBC AUTO: 43.5 FL (ref 37–54)
EGFRCR SERPLBLD CKD-EPI 2021: 87.2 ML/MIN/1.73
EOSINOPHIL # BLD AUTO: 0.45 10*3/MM3 (ref 0–0.4)
EOSINOPHIL NFR BLD AUTO: 5.3 % (ref 0.3–6.2)
ERYTHROCYTE [DISTWIDTH] IN BLOOD BY AUTOMATED COUNT: 12.9 % (ref 12.3–15.4)
GLOBULIN UR ELPH-MCNC: 2.7 GM/DL
GLUCOSE SERPL-MCNC: 78 MG/DL (ref 65–99)
HCT VFR BLD AUTO: 46.8 % (ref 37.5–51)
HGB BLD-MCNC: 15.6 G/DL (ref 13–17.7)
IMM GRANULOCYTES # BLD AUTO: 0.14 10*3/MM3 (ref 0–0.05)
IMM GRANULOCYTES NFR BLD AUTO: 1.6 % (ref 0–0.5)
LYMPHOCYTES # BLD AUTO: 2.36 10*3/MM3 (ref 0.7–3.1)
LYMPHOCYTES NFR BLD AUTO: 27.6 % (ref 19.6–45.3)
MCH RBC QN AUTO: 30.5 PG (ref 26.6–33)
MCHC RBC AUTO-ENTMCNC: 33.3 G/DL (ref 31.5–35.7)
MCV RBC AUTO: 91.4 FL (ref 79–97)
MONOCYTES # BLD AUTO: 0.62 10*3/MM3 (ref 0.1–0.9)
MONOCYTES NFR BLD AUTO: 7.3 % (ref 5–12)
NEUTROPHILS NFR BLD AUTO: 4.9 10*3/MM3 (ref 1.7–7)
NEUTROPHILS NFR BLD AUTO: 57.3 % (ref 42.7–76)
NRBC BLD AUTO-RTO: 0 /100 WBC (ref 0–0.2)
PLATELET # BLD AUTO: 249 10*3/MM3 (ref 140–450)
PMV BLD AUTO: 9.4 FL (ref 6–12)
POTASSIUM SERPL-SCNC: 4.2 MMOL/L (ref 3.5–5.2)
PROT SERPL-MCNC: 6.5 G/DL (ref 6–8.5)
RBC # BLD AUTO: 5.12 10*6/MM3 (ref 4.14–5.8)
SODIUM SERPL-SCNC: 138 MMOL/L (ref 136–145)
WBC NRBC COR # BLD AUTO: 8.55 10*3/MM3 (ref 3.4–10.8)

## 2024-05-06 PROCEDURE — 25510000001 IOPAMIDOL PER 1 ML: Performed by: EMERGENCY MEDICINE

## 2024-05-06 PROCEDURE — 99285 EMERGENCY DEPT VISIT HI MDM: CPT

## 2024-05-06 PROCEDURE — 80053 COMPREHEN METABOLIC PANEL: CPT | Performed by: PHYSICIAN ASSISTANT

## 2024-05-06 PROCEDURE — 71046 X-RAY EXAM CHEST 2 VIEWS: CPT

## 2024-05-06 PROCEDURE — 71275 CT ANGIOGRAPHY CHEST: CPT

## 2024-05-06 PROCEDURE — 85025 COMPLETE CBC W/AUTO DIFF WBC: CPT | Performed by: PHYSICIAN ASSISTANT

## 2024-05-06 RX ADMIN — IOPAMIDOL 95 ML: 755 INJECTION, SOLUTION INTRAVENOUS at 12:46

## 2024-06-11 ENCOUNTER — ANESTHESIA (OUTPATIENT)
Dept: GASTROENTEROLOGY | Facility: HOSPITAL | Age: 59
End: 2024-06-11
Payer: MEDICAID

## 2024-06-11 ENCOUNTER — HOSPITAL ENCOUNTER (OUTPATIENT)
Facility: HOSPITAL | Age: 59
Setting detail: HOSPITAL OUTPATIENT SURGERY
Discharge: HOME | End: 2024-06-11
Attending: INTERNAL MEDICINE | Admitting: INTERNAL MEDICINE
Payer: MEDICAID

## 2024-06-11 ENCOUNTER — ANESTHESIA EVENT (OUTPATIENT)
Dept: GASTROENTEROLOGY | Facility: HOSPITAL | Age: 59
End: 2024-06-11
Payer: MEDICAID

## 2024-06-11 VITALS
DIASTOLIC BLOOD PRESSURE: 79 MMHG | OXYGEN SATURATION: 96 % | SYSTOLIC BLOOD PRESSURE: 106 MMHG | HEART RATE: 104 BPM | RESPIRATION RATE: 17 BRPM

## 2024-06-11 DIAGNOSIS — J98.09 BRONCHOLITHIASIS: ICD-10-CM

## 2024-06-11 LAB
APPEARANCE FLD: CLEAR
COLOR FLD: COLORLESS
GIE STN SPEC: NORMAL
LYMPHOCYTES NFR FLD MANUAL: 20 %
METHOD: NORMAL
MONOCYTES NFR FLD: 3 %
MONOS+MACROS NFR FLD: 3 %
NEUTROPHILS NFR FLD MANUAL: 74 %
NUC CELL # FLD: 115 /MM3
RBC # FLD AUTO: 213 /MM3

## 2024-06-11 PROCEDURE — 87206 SMEAR FLUORESCENT/ACID STAI: CPT | Performed by: INTERNAL MEDICINE

## 2024-06-11 PROCEDURE — 89051 BODY FLUID CELL COUNT: CPT | Performed by: INTERNAL MEDICINE

## 2024-06-11 PROCEDURE — 25010000002 PROPOFOL 10 MG/ML EMULSION

## 2024-06-11 PROCEDURE — 88112 CYTOPATH CELL ENHANCE TECH: CPT | Performed by: INTERNAL MEDICINE

## 2024-06-11 PROCEDURE — 87071 CULTURE AEROBIC QUANT OTHER: CPT | Performed by: INTERNAL MEDICINE

## 2024-06-11 PROCEDURE — 88312 SPECIAL STAINS GROUP 1: CPT | Performed by: INTERNAL MEDICINE

## 2024-06-11 PROCEDURE — 87205 SMEAR GRAM STAIN: CPT | Performed by: INTERNAL MEDICINE

## 2024-06-11 PROCEDURE — 87116 MYCOBACTERIA CULTURE: CPT | Performed by: INTERNAL MEDICINE

## 2024-06-11 PROCEDURE — 87102 FUNGUS ISOLATION CULTURE: CPT | Performed by: INTERNAL MEDICINE

## 2024-06-11 PROCEDURE — 25010000002 MIDAZOLAM PER 1 MG: Performed by: ANESTHESIOLOGY

## 2024-06-11 PROCEDURE — 25010000002 ESMOLOL 100 MG/10ML SOLUTION

## 2024-06-11 PROCEDURE — 25810000003 LACTATED RINGERS PER 1000 ML: Performed by: INTERNAL MEDICINE

## 2024-06-11 PROCEDURE — 25810000003 LACTATED RINGERS PER 1000 ML

## 2024-06-11 PROCEDURE — 88305 TISSUE EXAM BY PATHOLOGIST: CPT | Performed by: INTERNAL MEDICINE

## 2024-06-11 RX ORDER — SODIUM CHLORIDE, SODIUM LACTATE, POTASSIUM CHLORIDE, CALCIUM CHLORIDE 600; 310; 30; 20 MG/100ML; MG/100ML; MG/100ML; MG/100ML
INJECTION, SOLUTION INTRAVENOUS CONTINUOUS PRN
Status: DISCONTINUED | OUTPATIENT
Start: 2024-06-11 | End: 2024-06-11 | Stop reason: SURG

## 2024-06-11 RX ORDER — SODIUM CHLORIDE 0.9 % (FLUSH) 0.9 %
3 SYRINGE (ML) INJECTION EVERY 12 HOURS SCHEDULED
Status: DISCONTINUED | OUTPATIENT
Start: 2024-06-11 | End: 2024-06-11 | Stop reason: HOSPADM

## 2024-06-11 RX ORDER — LIDOCAINE HYDROCHLORIDE 10 MG/ML
0.5 INJECTION, SOLUTION INFILTRATION; PERINEURAL ONCE AS NEEDED
Status: DISCONTINUED | OUTPATIENT
Start: 2024-06-11 | End: 2024-06-11 | Stop reason: HOSPADM

## 2024-06-11 RX ORDER — MIDAZOLAM HYDROCHLORIDE 1 MG/ML
1 INJECTION INTRAMUSCULAR; INTRAVENOUS
Status: DISCONTINUED | OUTPATIENT
Start: 2024-06-11 | End: 2024-06-11 | Stop reason: HOSPADM

## 2024-06-11 RX ORDER — LIDOCAINE HYDROCHLORIDE 10 MG/ML
INJECTION, SOLUTION EPIDURAL; INFILTRATION; INTRACAUDAL; PERINEURAL AS NEEDED
Status: DISCONTINUED | OUTPATIENT
Start: 2024-06-11 | End: 2024-06-11 | Stop reason: HOSPADM

## 2024-06-11 RX ORDER — ESMOLOL HYDROCHLORIDE 10 MG/ML
INJECTION INTRAVENOUS AS NEEDED
Status: DISCONTINUED | OUTPATIENT
Start: 2024-06-11 | End: 2024-06-11 | Stop reason: SURG

## 2024-06-11 RX ORDER — FAMOTIDINE 10 MG/ML
20 INJECTION, SOLUTION INTRAVENOUS ONCE
Status: COMPLETED | OUTPATIENT
Start: 2024-06-11 | End: 2024-06-11

## 2024-06-11 RX ORDER — SODIUM CHLORIDE, SODIUM LACTATE, POTASSIUM CHLORIDE, CALCIUM CHLORIDE 600; 310; 30; 20 MG/100ML; MG/100ML; MG/100ML; MG/100ML
30 INJECTION, SOLUTION INTRAVENOUS CONTINUOUS PRN
Status: DISCONTINUED | OUTPATIENT
Start: 2024-06-11 | End: 2024-06-11 | Stop reason: HOSPADM

## 2024-06-11 RX ORDER — PROPOFOL 10 MG/ML
VIAL (ML) INTRAVENOUS AS NEEDED
Status: DISCONTINUED | OUTPATIENT
Start: 2024-06-11 | End: 2024-06-11 | Stop reason: SURG

## 2024-06-11 RX ORDER — LIDOCAINE HYDROCHLORIDE 20 MG/ML
INJECTION, SOLUTION EPIDURAL; INFILTRATION; INTRACAUDAL; PERINEURAL AS NEEDED
Status: DISCONTINUED | OUTPATIENT
Start: 2024-06-11 | End: 2024-06-11 | Stop reason: HOSPADM

## 2024-06-11 RX ORDER — SODIUM CHLORIDE 0.9 % (FLUSH) 0.9 %
3-10 SYRINGE (ML) INJECTION AS NEEDED
Status: DISCONTINUED | OUTPATIENT
Start: 2024-06-11 | End: 2024-06-11 | Stop reason: HOSPADM

## 2024-06-11 RX ORDER — SODIUM CHLORIDE, SODIUM LACTATE, POTASSIUM CHLORIDE, CALCIUM CHLORIDE 600; 310; 30; 20 MG/100ML; MG/100ML; MG/100ML; MG/100ML
9 INJECTION, SOLUTION INTRAVENOUS CONTINUOUS
Status: DISCONTINUED | OUTPATIENT
Start: 2024-06-11 | End: 2024-06-11 | Stop reason: HOSPADM

## 2024-06-11 RX ORDER — LIDOCAINE HYDROCHLORIDE 20 MG/ML
INJECTION, SOLUTION INFILTRATION; PERINEURAL AS NEEDED
Status: DISCONTINUED | OUTPATIENT
Start: 2024-06-11 | End: 2024-06-11 | Stop reason: SURG

## 2024-06-11 RX ADMIN — LIDOCAINE HYDROCHLORIDE 80 MG: 20 INJECTION, SOLUTION INFILTRATION; PERINEURAL at 10:40

## 2024-06-11 RX ADMIN — PROPOFOL 200 MG: 10 INJECTION, EMULSION INTRAVENOUS at 10:40

## 2024-06-11 RX ADMIN — SODIUM CHLORIDE, POTASSIUM CHLORIDE, SODIUM LACTATE AND CALCIUM CHLORIDE 30 ML/HR: 600; 310; 30; 20 INJECTION, SOLUTION INTRAVENOUS at 10:11

## 2024-06-11 RX ADMIN — FAMOTIDINE 20 MG: 10 INJECTION INTRAVENOUS at 10:18

## 2024-06-11 RX ADMIN — SODIUM CHLORIDE, POTASSIUM CHLORIDE, SODIUM LACTATE AND CALCIUM CHLORIDE: 600; 310; 30; 20 INJECTION, SOLUTION INTRAVENOUS at 10:21

## 2024-06-11 RX ADMIN — PROPOFOL 160 MCG/KG/MIN: 10 INJECTION, EMULSION INTRAVENOUS at 10:40

## 2024-06-11 RX ADMIN — MIDAZOLAM 1 MG: 1 INJECTION INTRAMUSCULAR; INTRAVENOUS at 10:10

## 2024-06-11 RX ADMIN — ESMOLOL HYDROCHLORIDE 20 MG: 100 INJECTION, SOLUTION INTRAVENOUS at 10:50

## 2024-06-11 NOTE — ANESTHESIA POSTPROCEDURE EVALUATION
Patient: Oliverio Corrales    Procedure Summary       Date: 06/11/24 Room / Location:  [facility]    Anesthesia Start: 1021 Anesthesia Stop: 1109    Procedure: BRONCHOSCOPY WITH BRONCHIAL AVEOLAR LAVAGE, brushing and biopsies (Bronchus) Diagnosis:     Surgeons: Alexander Matthews MD Provider: Sarabjit Segura MD    Anesthesia Type: general ASA Status: 3            Anesthesia Type: general    Vitals  Vitals Value Taken Time   /79 06/11/24 1201   Temp     Pulse 104 06/11/24 1127   Resp 17 06/11/24 1127   SpO2 96 % 06/11/24 1127           Post Anesthesia Care and Evaluation    Level of consciousness: awake and alert  Pain management: adequate  Anesthetic complications: No anesthetic complications    Cardiovascular status: acceptable  Respiratory status: acceptable  Hydration status: acceptable    Comments: /79   Pulse 104   Resp 17   SpO2 96%

## 2024-06-11 NOTE — ANESTHESIA PROCEDURE NOTES
Airway  Urgency: elective    Date/Time: 6/11/2024 10:42 AM  Airway not difficult    General Information and Staff    Patient location during procedure: OR  CRNA/CAA: Jeannette Gamboa CRNA    Indications and Patient Condition  Indications for airway management: airway protection    Preoxygenated: yes  Mask difficulty assessment: 1 - vent by mask    Final Airway Details  Final airway type: supraglottic airway      Successful airway: LMA (ambu)  Size 4     Number of attempts at approach: 1  Assessment: lips, teeth, and gum same as pre-op    Additional Comments  LMA placed without difficulty, ventilation with assist, equal breath sounds and symmetric chest rise and fall

## 2024-06-11 NOTE — DISCHARGE INSTRUCTIONS
For the next 24 hours patient needs to be with a responsible adult.    For 24 hours DO NOT drive, operate machinery, appliances, drink alcohol, make important decisions or sign legal documents.    Start with a light or bland diet if you are feeling sick to your stomach otherwise advance to regular diet as tolerated.    Follow recommendations on procedure report if provided by your doctor.    Call Dr. Alexander Matthews for problems     Problems may include but not limited to: large amounts of bleeding, trouble breathing, repeated vomiting, severe unrelieved pain, fever or chills.

## 2024-06-11 NOTE — ANESTHESIA PREPROCEDURE EVALUATION
Anesthesia Evaluation     Patient summary reviewed and Nursing notes reviewed   no history of anesthetic complications:   NPO Solid Status: > 6 hours  NPO Liquid Status: > 2 hours           Airway   Mallampati: II  TM distance: >3 FB  Neck ROM: full  no difficulty expected and No difficulty expected  Dental - normal exam   (+) implants, upper dentures and lower dentures    Comment: Risks of dental injury discussed    Pulmonary - normal exam   (+) pulmonary embolism,shortness of breath, sleep apnea  (-) rhonchi, decreased breath sounds, wheezes, rales, stridor  Cardiovascular - normal exam    NYHA Classification: I  ECG reviewed  Rhythm: regular  Rate: normal    (+) CAD, dysrhythmias Atrial Fib, PVD  (-) murmur, weak pulses, friction rub, systolic click, carotid bruits, JVD, peripheral edema    ROS comment: NSR    Neuro/Psych  (+) headaches  GI/Hepatic/Renal/Endo    (+) hiatal hernia, GERD    Musculoskeletal (-) negative ROS    Abdominal    Substance History - negative use     OB/GYN negative ob/gyn ROS         Other - negative ROS                         Anesthesia Plan    ASA 3     general     intravenous induction     Anesthetic plan, risks, benefits, and alternatives have been provided, discussed and informed consent has been obtained with: patient.        CODE STATUS:

## 2024-06-12 LAB
CYTO UR: NORMAL
LAB AP CASE REPORT: NORMAL
LAB AP CLINICAL INFORMATION: NORMAL
PATH REPORT.ADDENDUM SPEC: NORMAL
PATH REPORT.FINAL DX SPEC: NORMAL
PATH REPORT.GROSS SPEC: NORMAL

## 2024-06-13 LAB
BACTERIA SPEC AEROBE CULT: NORMAL
GRAM STN SPEC: NORMAL
GRAM STN SPEC: NORMAL
LAB AP CASE REPORT: NORMAL
LAB AP SPECIAL STAINS: NORMAL
PATH REPORT.ADDENDUM SPEC: NORMAL
PATH REPORT.FINAL DX SPEC: NORMAL
PATH REPORT.GROSS SPEC: NORMAL

## 2024-06-16 LAB
FUNGUS WND CULT: NORMAL
MYCOBACTERIUM SPEC CULT: NORMAL
NIGHT BLUE STAIN TISS: NORMAL

## 2024-06-18 ENCOUNTER — OFFICE VISIT (OUTPATIENT)
Dept: INTERNAL MEDICINE | Facility: CLINIC | Age: 59
End: 2024-06-18
Payer: MEDICAID

## 2024-06-18 VITALS
DIASTOLIC BLOOD PRESSURE: 82 MMHG | SYSTOLIC BLOOD PRESSURE: 130 MMHG | HEART RATE: 78 BPM | WEIGHT: 189.6 LBS | TEMPERATURE: 97.7 F | OXYGEN SATURATION: 98 % | HEIGHT: 71 IN | BODY MASS INDEX: 26.54 KG/M2

## 2024-06-18 DIAGNOSIS — R00.0 TACHYCARDIA: ICD-10-CM

## 2024-06-18 DIAGNOSIS — R73.9 HYPERGLYCEMIA: ICD-10-CM

## 2024-06-18 DIAGNOSIS — J98.09 BRONCHOLITHIASIS: ICD-10-CM

## 2024-06-18 DIAGNOSIS — Z86.711 PERSONAL HISTORY OF PULMONARY EMBOLISM: ICD-10-CM

## 2024-06-18 DIAGNOSIS — I10 HYPERTENSION, UNSPECIFIED TYPE: ICD-10-CM

## 2024-06-18 DIAGNOSIS — K22.70 BARRETT'S ESOPHAGUS DETERMINED BY ENDOSCOPY: ICD-10-CM

## 2024-06-18 DIAGNOSIS — R00.2 PALPITATIONS: Primary | ICD-10-CM

## 2024-06-18 DIAGNOSIS — I25.10 CORONARY ARTERIOSCLEROSIS: ICD-10-CM

## 2024-06-18 DIAGNOSIS — Z12.5 SCREENING FOR PROSTATE CANCER: ICD-10-CM

## 2024-06-18 LAB
FUNGUS WND CULT: NORMAL
MYCOBACTERIUM SPEC CULT: NORMAL
NIGHT BLUE STAIN TISS: NORMAL

## 2024-06-18 PROCEDURE — 1160F RVW MEDS BY RX/DR IN RCRD: CPT | Performed by: STUDENT IN AN ORGANIZED HEALTH CARE EDUCATION/TRAINING PROGRAM

## 2024-06-18 PROCEDURE — 1126F AMNT PAIN NOTED NONE PRSNT: CPT | Performed by: STUDENT IN AN ORGANIZED HEALTH CARE EDUCATION/TRAINING PROGRAM

## 2024-06-18 PROCEDURE — 1159F MED LIST DOCD IN RCRD: CPT | Performed by: STUDENT IN AN ORGANIZED HEALTH CARE EDUCATION/TRAINING PROGRAM

## 2024-06-18 PROCEDURE — 99204 OFFICE O/P NEW MOD 45 MIN: CPT | Performed by: STUDENT IN AN ORGANIZED HEALTH CARE EDUCATION/TRAINING PROGRAM

## 2024-06-18 RX ORDER — HYDROCHLOROTHIAZIDE 12.5 MG/1
12.5 TABLET ORAL DAILY
Qty: 30 TABLET | Refills: 3 | Status: SHIPPED | OUTPATIENT
Start: 2024-06-18

## 2024-06-18 NOTE — PROGRESS NOTES
New Patient Office Visit      Patient Name: Oliverio Corrales  : 1965   MRN: [id number]   Care Team: Patient Care Team:  Ajit Carlin MD as PCP - General (Internal Medicine)    Chief Complaint:    Chief Complaint   Patient presents with    Establish Care    Palpitations       History of Present Illness: Oliverio Corrales is a 58 y.o. male who is here today to establish care.    He was previously following with an outside PCP but transferring care here.     His primary concern if episodic palpitations that have been present for years. He states he takes HCTZ when these occur which resolve. He has been told in the past he has afib but is not on blood thiners or rate control. He denies chest pain, syncope, dizziness     He also follows with pulm for broncholithasis and has upcoming appt. Had recent bronch that demonstrated this.         Subjective      Review of Systems:   Review of Systems   Constitutional:  Negative for fever.   Respiratory:  Positive for cough. Negative for shortness of breath and wheezing.    Cardiovascular:  Positive for palpitations. Negative for chest pain and leg swelling.    - See HPI    Past Medical History:   Past Medical History:   Diagnosis Date    ASHD (arteriosclerotic heart disease) 10/08/2018    Atrial fibrillation 10/08/2018    Attention deficit hyperactivity disorder 2022    GERD (gastroesophageal reflux disease)     Hearing loss 10/16/2018    Intractable chronic migraine without aura 2016    Osteoarthritis of left glenohumeral joint 10/03/2023    Personal history of pulmonary embolism 2017    Peyronie's disease 2020    Pulmonary embolism     Reflux esophagitis     Sleep apnea 2017    Thrombosis of superficial vein of penis 2021       Past Surgical History:   Past Surgical History:   Procedure Laterality Date    BRONCHOSCOPY N/A 2024    Procedure: BRONCHOSCOPY WITH BRONCHIAL AVEOLAR LAVAGE, brushing and biopsies;  Surgeon: Cassie  Alexander RAYGOZA MD;  Location: [facility] ENDOSCOPY;  Service: Pulmonary;  Laterality: N/A;  pre: broncholith  post:endobronchial lesion/mass    CHOLECYSTECTOMY      COLONOSCOPY      ENDOSCOPY      FACELIFT N/A 1/29/2021    Procedure: FULL FACE LIFT WITH  FAT TRANSFER;  Surgeon: Roberto Connelly MD;  Location: [facility] MAIN OR;  Service: New Image;  Laterality: N/A;    INGUINAL HERNIA REPAIR      NISSEN FUNDOPLICATION N/A 1/3/2019    Procedure: REDO LAPAROSCOPIC NISSEN FUNDOPLICATION  WITH DAVINCI ROBOT AND MESH;  Surgeon: Qi Beltre MD;  Location: [facility] MAIN OR;  Service: DaVinci    SHOULDER SURGERY Left     TONSILLECTOMY      TRUNK LESION/CYST EXCISION N/A 6/3/2022    Procedure: INCISION AND DRAINAGE INFECTED CYST OF CHEST;  Surgeon: Roberto Connelly MD;  Location: [facility] MAIN OR;  Service: Plastics;  Laterality: N/A;       Family History:   Family History   Problem Relation Age of Onset    Lung disease Father     Malig Hyperthermia Neg Hx        Social History:   Social History     Socioeconomic History    Marital status: Single   Tobacco Use    Smoking status: Never     Passive exposure: Never    Smokeless tobacco: Never   Vaping Use    Vaping status: Never Used   Substance and Sexual Activity    Alcohol use: No    Drug use: No    Sexual activity: Defer     Birth control/protection: None       Tobacco History:   Social History     Tobacco Use   Smoking Status Never    Passive exposure: Never   Smokeless Tobacco Never       Medications:     Current Outpatient Medications:     Biotin w/ Vitamins C & E (Hair/Skin/Nails) 1250-7.5-7.5 MCG-MG-UNT chewable tablet, 5 mg., Disp: , Rfl:     Carbonyl Iron 15 MG chewable tablet, 65 mg., Disp: , Rfl:     cyanocobalamin (VITAMIN B-12) 1000 MCG tablet, Take 1 tablet by mouth Daily., Disp: , Rfl:     ferrous gluconate (FERGON) 324 MG tablet, Take 1 tablet by mouth Daily With Breakfast., Disp: , Rfl:     Minoxidil (ROGAINE EXTRA STRENGTH) 5 % solution, 0.05 g., Disp: , Rfl:      "olopatadine (PATANOL) 0.1 % ophthalmic solution, Administer 1 drop to both eyes 2 (Two) Times a Day., Disp: 5 mL, Rfl: 0    tobramycin in sterile water (preservative free) injection-balanced salts ophthalmic solution, Apply 1-2 drops to eye(s) as directed by provider Every 4 (Four) Hours., Disp: 5 mL, Rfl: 0    hydroCHLOROthiazide 12.5 MG tablet, Take 1 tablet by mouth Daily., Disp: 30 tablet, Rfl: 3    Allergies:   Allergies   Allergen Reactions    Erythromycin GI Intolerance    Hydrocortisone Nausea Only, Palpitations and Confusion     \"injecting cortisone\", can tolerate skin cream just fine. Patient states he can take it heart races    Cortisone Palpitations    Amoxicillin GI Intolerance     Category: Allergy;     Penicillins Other (See Comments)     PT reports Pustules on head and hair loss     Sulfa Antibiotics GI Intolerance     Category: Allergy;        Objective     Physical Exam:  Vital Signs:   Vitals:    06/18/24 1522   BP: 130/82   Pulse: 78   Temp: 97.7 °F (36.5 °C)   TempSrc: Temporal   SpO2: 98%   Weight: 86 kg (189 lb 9.6 oz)   Height: 180.3 cm (71\")     Body mass index is 26.44 kg/m².     Physical Exam  Vitals reviewed.   Constitutional:       General: He is not in acute distress.     Appearance: Normal appearance. He is not toxic-appearing.   HENT:      Head: Normocephalic and atraumatic.   Eyes:      General: No scleral icterus.     Conjunctiva/sclera: Conjunctivae normal.   Cardiovascular:      Rate and Rhythm: Normal rate and regular rhythm.      Heart sounds: Normal heart sounds.   Pulmonary:      Effort: Pulmonary effort is normal.      Breath sounds: Normal breath sounds. No wheezing or rales.   Skin:     General: Skin is warm and dry.   Neurological:      Mental Status: He is alert and oriented to person, place, and time.   Psychiatric:         Mood and Affect: Mood normal.         Behavior: Behavior normal.         Assessment / Plan      Assessment/Plan:   Problems Addressed This " Visit  Diagnoses and all orders for this visit:    1. Palpitations (Primary)    2. Tachycardia    3. Hypertension, unspecified type  -     hydroCHLOROthiazide 12.5 MG tablet; Take 1 tablet by mouth Daily.  Dispense: 30 tablet; Refill: 3  -     Comprehensive Metabolic Panel; Future    4. Personal history of pulmonary embolism    5. Torres's esophagus determined by endoscopy    6. Broncholithiasis    7. Screening for prostate cancer  -     PSA Screen; Future    8. Hyperglycemia  -     Hemoglobin A1c; Future    9. Coronary arteriosclerosis  -     Lipid Panel With / Chol / HDL Ratio; Future      Reviewed bronch report, prior EKG's, labs dating back to 2023   Strongly recommended holter monitor given concern of afib. Auscultation demonstrated what appeared NSR and all EKG's previously noted sinus. Of he has a history of PE noted in 2017 but this sounds to be provoked from prior surgery so not on AC. We will readdress at next visit but he is not interested in getting holter monitor during summer due to concern for sweat causing interference.     Suspect some degree of HTN and on HCTZ, will refill HCTZ. Labs appropriate previously    Had previous EGD noted Torres's will need to discuss repeat EGD at next appt     Follow up with pulm this week for broncholithiasis     Will have him get labs prior to CPE upcoming in 3mo, sooner prn       Plan of care reviewed with patient at the conclusion of today's visit. Education was provided regarding diagnosis and management.  Patient verbalizes understanding of and agreement with management plan.      Follow Up:   Return in about 3 months (around 9/18/2024) for Annual physical.          MD IVETTE [facility]  [address] [place] [phone]  Fax [phone]

## 2024-06-25 LAB
FUNGUS WND CULT: NORMAL
MYCOBACTERIUM SPEC CULT: NORMAL
NIGHT BLUE STAIN TISS: NORMAL

## 2024-07-02 LAB
FUNGUS WND CULT: NORMAL
MYCOBACTERIUM SPEC CULT: NORMAL
NIGHT BLUE STAIN TISS: NORMAL

## 2024-07-09 LAB
FUNGUS WND CULT: NORMAL
MYCOBACTERIUM SPEC CULT: NORMAL
NIGHT BLUE STAIN TISS: NORMAL

## 2024-07-16 LAB
MYCOBACTERIUM SPEC CULT: NORMAL
NIGHT BLUE STAIN TISS: NORMAL

## 2024-07-23 LAB
MYCOBACTERIUM SPEC CULT: NORMAL
NIGHT BLUE STAIN TISS: NORMAL

## 2024-07-24 ENCOUNTER — OFFICE VISIT (OUTPATIENT)
Dept: INTERNAL MEDICINE | Facility: CLINIC | Age: 59
End: 2024-07-24
Payer: MEDICAID

## 2024-07-24 ENCOUNTER — HOSPITAL ENCOUNTER (OUTPATIENT)
Facility: HOSPITAL | Age: 59
Discharge: HOME | End: 2024-07-24
Payer: MEDICAID

## 2024-07-24 VITALS
WEIGHT: 186.2 LBS | BODY MASS INDEX: 26.07 KG/M2 | OXYGEN SATURATION: 96 % | HEIGHT: 71 IN | TEMPERATURE: 96.7 F | HEART RATE: 73 BPM | DIASTOLIC BLOOD PRESSURE: 76 MMHG | SYSTOLIC BLOOD PRESSURE: 124 MMHG

## 2024-07-24 DIAGNOSIS — M17.12 OSTEOARTHRITIS OF LEFT KNEE, UNSPECIFIED OSTEOARTHRITIS TYPE: Primary | ICD-10-CM

## 2024-07-24 PROCEDURE — 1126F AMNT PAIN NOTED NONE PRSNT: CPT | Performed by: STUDENT IN AN ORGANIZED HEALTH CARE EDUCATION/TRAINING PROGRAM

## 2024-07-24 PROCEDURE — 99213 OFFICE O/P EST LOW 20 MIN: CPT | Performed by: STUDENT IN AN ORGANIZED HEALTH CARE EDUCATION/TRAINING PROGRAM

## 2024-07-24 NOTE — PROGRESS NOTES
"Chief Complaint  Knee Pain    Subjective        Oliverio Corrales presents to [facility]  History of Present Illness    Mr. Corrales presents to clinic today for knee pain    He states that he has had some ongoing pain in his left knee. He has a history of meniscus surgery in this and recently has had some ongoing pain that is worse with activity, has limited his ability to do day to day activities.             Objective   Vital Signs:  /76   Pulse 73   Temp 96.7 °F (35.9 °C) (Temporal)   Ht 180.3 cm (71\")   Wt 84.5 kg (186 lb 3.2 oz)   SpO2 96%   BMI 25.97 kg/m²   Estimated body mass index is 25.97 kg/m² as calculated from the following:    Height as of this encounter: 180.3 cm (71\").    Weight as of this encounter: 84.5 kg (186 lb 3.2 oz).             Physical Exam  Vitals reviewed.   Constitutional:       General: He is not in acute distress.     Appearance: Normal appearance. He is not toxic-appearing.   HENT:      Head: Normocephalic and atraumatic.   Eyes:      General: No scleral icterus.     Conjunctiva/sclera: Conjunctivae normal.   Musculoskeletal:      Left knee: Bony tenderness and crepitus present. No effusion or erythema. Normal range of motion.   Skin:     General: Skin is warm and dry.   Neurological:      Mental Status: He is alert and oriented to person, place, and time.   Psychiatric:         Mood and Affect: Mood normal.         Behavior: Behavior normal.        Result Review :                   Assessment and Plan   Diagnoses and all orders for this visit:    1. Osteoarthritis of left knee, unspecified osteoarthritis type (Primary)  -     XR Knee 3 View Left      Will obtain xrays today, consider ortho referral as he may benefit from injection and/or revision of prior surgery         Follow Up   No follow-ups on file.  Patient was given instructions and counseling regarding his condition or for health maintenance advice. Please see specific information " pulled into the AVS if appropriate.

## 2024-08-07 ENCOUNTER — ANESTHESIA (OUTPATIENT)
Dept: GASTROENTEROLOGY | Facility: HOSPITAL | Age: 59
End: 2024-08-07
Payer: MEDICAID

## 2024-08-07 ENCOUNTER — HOSPITAL ENCOUNTER (OUTPATIENT)
Facility: HOSPITAL | Age: 59
Setting detail: HOSPITAL OUTPATIENT SURGERY
Discharge: HOME | End: 2024-08-07
Attending: INTERNAL MEDICINE | Admitting: INTERNAL MEDICINE
Payer: MEDICAID

## 2024-08-07 ENCOUNTER — ANESTHESIA EVENT (OUTPATIENT)
Dept: GASTROENTEROLOGY | Facility: HOSPITAL | Age: 59
End: 2024-08-07
Payer: MEDICAID

## 2024-08-07 VITALS
BODY MASS INDEX: 25.86 KG/M2 | RESPIRATION RATE: 16 BRPM | WEIGHT: 184.7 LBS | SYSTOLIC BLOOD PRESSURE: 120 MMHG | OXYGEN SATURATION: 99 % | HEIGHT: 71 IN | HEART RATE: 66 BPM | DIASTOLIC BLOOD PRESSURE: 80 MMHG

## 2024-08-07 DIAGNOSIS — J98.09 BRONCHOLITHIASIS: ICD-10-CM

## 2024-08-07 LAB — GIE STN SPEC: NORMAL

## 2024-08-07 PROCEDURE — 25810000003 LACTATED RINGERS PER 1000 ML: Performed by: INTERNAL MEDICINE

## 2024-08-07 PROCEDURE — 25010000002 PROPOFOL 10 MG/ML EMULSION: Performed by: ANESTHESIOLOGY

## 2024-08-07 PROCEDURE — 87205 SMEAR GRAM STAIN: CPT | Performed by: INTERNAL MEDICINE

## 2024-08-07 PROCEDURE — 88312 SPECIAL STAINS GROUP 1: CPT | Performed by: INTERNAL MEDICINE

## 2024-08-07 PROCEDURE — 87102 FUNGUS ISOLATION CULTURE: CPT | Performed by: INTERNAL MEDICINE

## 2024-08-07 PROCEDURE — 25010000002 GLYCOPYRROLATE 0.2 MG/ML SOLUTION: Performed by: ANESTHESIOLOGY

## 2024-08-07 PROCEDURE — 88305 TISSUE EXAM BY PATHOLOGIST: CPT | Performed by: INTERNAL MEDICINE

## 2024-08-07 PROCEDURE — 88112 CYTOPATH CELL ENHANCE TECH: CPT | Performed by: INTERNAL MEDICINE

## 2024-08-07 PROCEDURE — 87206 SMEAR FLUORESCENT/ACID STAI: CPT | Performed by: INTERNAL MEDICINE

## 2024-08-07 PROCEDURE — 87071 CULTURE AEROBIC QUANT OTHER: CPT | Performed by: INTERNAL MEDICINE

## 2024-08-07 RX ORDER — PROPOFOL 10 MG/ML
VIAL (ML) INTRAVENOUS AS NEEDED
Status: DISCONTINUED | OUTPATIENT
Start: 2024-08-07 | End: 2024-08-07 | Stop reason: SURG

## 2024-08-07 RX ORDER — LIDOCAINE HYDROCHLORIDE 10 MG/ML
INJECTION, SOLUTION EPIDURAL; INFILTRATION; INTRACAUDAL; PERINEURAL AS NEEDED
Status: DISCONTINUED | OUTPATIENT
Start: 2024-08-07 | End: 2024-08-07 | Stop reason: HOSPADM

## 2024-08-07 RX ORDER — GLYCOPYRROLATE 0.2 MG/ML
INJECTION INTRAMUSCULAR; INTRAVENOUS AS NEEDED
Status: DISCONTINUED | OUTPATIENT
Start: 2024-08-07 | End: 2024-08-07 | Stop reason: SURG

## 2024-08-07 RX ORDER — LIDOCAINE HYDROCHLORIDE 20 MG/ML
INJECTION, SOLUTION INFILTRATION; PERINEURAL AS NEEDED
Status: DISCONTINUED | OUTPATIENT
Start: 2024-08-07 | End: 2024-08-07 | Stop reason: SURG

## 2024-08-07 RX ORDER — SODIUM CHLORIDE, SODIUM LACTATE, POTASSIUM CHLORIDE, CALCIUM CHLORIDE 600; 310; 30; 20 MG/100ML; MG/100ML; MG/100ML; MG/100ML
30 INJECTION, SOLUTION INTRAVENOUS CONTINUOUS PRN
Status: DISCONTINUED | OUTPATIENT
Start: 2024-08-07 | End: 2024-08-07 | Stop reason: HOSPADM

## 2024-08-07 RX ORDER — LIDOCAINE HYDROCHLORIDE 20 MG/ML
INJECTION, SOLUTION EPIDURAL; INFILTRATION; INTRACAUDAL; PERINEURAL AS NEEDED
Status: DISCONTINUED | OUTPATIENT
Start: 2024-08-07 | End: 2024-08-07 | Stop reason: HOSPADM

## 2024-08-07 RX ADMIN — PROPOFOL 200 MG: 10 INJECTION, EMULSION INTRAVENOUS at 08:18

## 2024-08-07 RX ADMIN — SODIUM CHLORIDE, POTASSIUM CHLORIDE, SODIUM LACTATE AND CALCIUM CHLORIDE 30 ML/HR: 600; 310; 30; 20 INJECTION, SOLUTION INTRAVENOUS at 07:49

## 2024-08-07 RX ADMIN — PROPOFOL 160 MCG/KG/MIN: 10 INJECTION, EMULSION INTRAVENOUS at 08:18

## 2024-08-07 RX ADMIN — GLYCOPYRROLATE 0.2 MG: 0.2 INJECTION INTRAMUSCULAR; INTRAVENOUS at 08:14

## 2024-08-07 RX ADMIN — LIDOCAINE HYDROCHLORIDE 100 MG: 20 INJECTION, SOLUTION INFILTRATION; PERINEURAL at 08:18

## 2024-08-07 NOTE — ANESTHESIA POSTPROCEDURE EVALUATION
Patient: Oliverio Corrales    Procedure Summary       Date: 08/07/24 Room / Location:  [facility]    Anesthesia Start: 0811 Anesthesia Stop: 0843    Procedure: BRONCHOSCOPY WITH BRONCHIAL AVEOLAR LAVAGE and biopsy (Bronchus) Diagnosis:     Surgeons: Alexander Matthews MD Provider: Hayder Oneill MD    Anesthesia Type: general ASA Status: 3            Anesthesia Type: general    Vitals  Vitals Value Taken Time   /80 08/07/24 0902   Temp     Pulse 62 08/07/24 0910   Resp 16 08/07/24 0901   SpO2 99 % 08/07/24 0910   Vitals shown include unfiled device data.        Post Anesthesia Care and Evaluation    Patient location during evaluation: bedside  Patient participation: complete - patient participated  Level of consciousness: awake  Pain management: adequate    Airway patency: patent  Anesthetic complications: No anesthetic complications  PONV Status: none  Cardiovascular status: acceptable  Respiratory status: acceptable  Hydration status: acceptable  Post Neuraxial Block status: Motor and sensory function returned to baseline

## 2024-08-07 NOTE — ANESTHESIA PROCEDURE NOTES
Airway  Urgency: elective    Date/Time: 8/7/2024 8:20 AM  Airway not difficult    General Information and Staff    Patient location during procedure: OR  Anesthesiologist: Hayder Oneill MD    Indications and Patient Condition  Indications for airway management: airway protection    Preoxygenated: yes  MILS maintained throughout  Mask difficulty assessment: 0 - not attempted    Final Airway Details  Final airway type: supraglottic airway      Successful airway: classic  Size 4     Number of attempts at approach: 1  Assessment: lips, teeth, and gum same as pre-op and atraumatic intubation

## 2024-08-07 NOTE — DISCHARGE INSTRUCTIONS
For the next 24 hours patient needs to be with a responsible adult.    For 24 hours DO NOT drive, operate machinery, appliances, drink alcohol, make important decisions or sign legal documents.    Nothing by mouth until 10:20 am then start with a light or bland diet, if you are feeling sick to your stomach, otherwise advance to regular diet as tolerated.    Follow recommendations on procedure report if provided by your doctor.    Call Dr. Matthews for problems [phone]    Problems may include but not limited to: large amounts of bleeding, trouble breathing, repeated vomiting, severe unrelieved pain, fever or chills.

## 2024-08-08 LAB
LAB AP CASE REPORT: NORMAL
LAB AP SPECIAL STAINS: NORMAL
PATH REPORT.FINAL DX SPEC: NORMAL
PATH REPORT.GROSS SPEC: NORMAL

## 2024-08-09 LAB
BACTERIA SPEC AEROBE CULT: NO GROWTH
CYTO UR: NORMAL
GRAM STN SPEC: NORMAL
LAB AP CASE REPORT: NORMAL
LAB AP CLINICAL INFORMATION: NORMAL
PATH REPORT.FINAL DX SPEC: NORMAL
PATH REPORT.GROSS SPEC: NORMAL

## 2024-08-14 LAB — FUNGUS WND CULT: NORMAL

## 2024-08-21 LAB — FUNGUS WND CULT: NORMAL

## 2024-08-28 LAB — FUNGUS WND CULT: NORMAL

## 2024-08-30 ENCOUNTER — PRE-ADMISSION TESTING (OUTPATIENT)
Dept: PREADMISSION TESTING | Facility: HOSPITAL | Age: 59
End: 2024-08-30
Payer: MEDICAID

## 2024-08-30 VITALS
SYSTOLIC BLOOD PRESSURE: 136 MMHG | HEART RATE: 62 BPM | OXYGEN SATURATION: 96 % | TEMPERATURE: 98 F | RESPIRATION RATE: 16 BRPM | DIASTOLIC BLOOD PRESSURE: 85 MMHG | BODY MASS INDEX: 27.84 KG/M2 | WEIGHT: 183.7 LBS | HEIGHT: 68 IN

## 2024-08-30 LAB
ANION GAP SERPL CALCULATED.3IONS-SCNC: 12.3 MMOL/L (ref 5–15)
APTT PPP: 30.9 SECONDS (ref 22.7–35.4)
BUN SERPL-MCNC: 21 MG/DL (ref 8–23)
BUN/CREAT SERPL: 20.8 (ref 7–25)
CALCIUM SPEC-SCNC: 9.2 MG/DL (ref 8.6–10.5)
CHLORIDE SERPL-SCNC: 102 MMOL/L (ref 98–107)
CO2 SERPL-SCNC: 24.7 MMOL/L (ref 22–29)
CREAT SERPL-MCNC: 1.01 MG/DL (ref 0.76–1.27)
CYTO UR: NORMAL
DEPRECATED RDW RBC AUTO: 42.2 FL (ref 37–54)
EGFRCR SERPLBLD CKD-EPI 2021: 84.6 ML/MIN/1.73
ERYTHROCYTE [DISTWIDTH] IN BLOOD BY AUTOMATED COUNT: 12.8 % (ref 12.3–15.4)
GLUCOSE SERPL-MCNC: 92 MG/DL (ref 65–99)
HCT VFR BLD AUTO: 45.1 % (ref 37.5–51)
HGB BLD-MCNC: 15.5 G/DL (ref 13–17.7)
INR PPP: 1.15 (ref 0.9–1.1)
LAB AP CASE REPORT: NORMAL
LAB AP CLINICAL INFORMATION: NORMAL
MCH RBC QN AUTO: 31.3 PG (ref 26.6–33)
MCHC RBC AUTO-ENTMCNC: 34.4 G/DL (ref 31.5–35.7)
MCV RBC AUTO: 90.9 FL (ref 79–97)
PATH REPORT.ADDENDUM SPEC: NORMAL
PATH REPORT.FINAL DX SPEC: NORMAL
PATH REPORT.GROSS SPEC: NORMAL
PLATELET # BLD AUTO: 198 10*3/MM3 (ref 140–450)
PMV BLD AUTO: 10 FL (ref 6–12)
POTASSIUM SERPL-SCNC: 3.9 MMOL/L (ref 3.5–5.2)
PROTHROMBIN TIME: 14.9 SECONDS (ref 11.7–14.2)
RBC # BLD AUTO: 4.96 10*6/MM3 (ref 4.14–5.8)
SODIUM SERPL-SCNC: 139 MMOL/L (ref 136–145)
WBC NRBC COR # BLD AUTO: 9.17 10*3/MM3 (ref 3.4–10.8)

## 2024-08-30 PROCEDURE — 80048 BASIC METABOLIC PNL TOTAL CA: CPT

## 2024-08-30 PROCEDURE — 85730 THROMBOPLASTIN TIME PARTIAL: CPT

## 2024-08-30 PROCEDURE — 85027 COMPLETE CBC AUTOMATED: CPT

## 2024-08-30 PROCEDURE — 85610 PROTHROMBIN TIME: CPT

## 2024-08-30 PROCEDURE — 36415 COLL VENOUS BLD VENIPUNCTURE: CPT

## 2024-08-30 NOTE — DISCHARGE INSTRUCTIONS
Take the following medications the morning of surgery with a small sip of water:    MAY TAKE EYE DROPS    If you are on prescription narcotic pain medication to control your pain you may also take that medication the morning of surgery.    General Instructions:  Do not eat or drink anything after midnight the night before surgery.  Patients who avoid smoking, chewing tobacco and alcohol for 4 weeks prior to surgery have a reduced risk of post-operative complications.   Do not smoke, use chewing tobacco or drink alcohol the day of surgery.   Bring any papers given to you in the doctor’s office.  Wear clean comfortable clothes.  Do not wear contact lenses, false eyelashes or make-up.  Bring a case for your glasses.   Remove all piercings.  Leave jewelry and any other valuables at home.  The Pre-Admission Testing nurse will instruct you to bring medications if unable to obtain an accurate list in Pre-Admission Testing.            Preventing a Surgical Site Infection:  For 2 to 3 days before surgery, avoid shaving with a razor because the razor can irritate skin and make it easier to develop an infection.    Any areas of open skin can increase the risk of a post-operative wound infection by allowing bacteria to enter and travel throughout the body.  Notify your surgeon if you have any skin wounds / rashes even if it is not near the expected surgical site.  The area will need assessed to determine if surgery should be delayed until it is healed.  The night prior to surgery shower using a fresh bar of anti-bacterial soap (such as Dial) and clean washcloth.  Sleep in a clean bed with clean clothing.  Do not allow pets to sleep with you.  Shower on the morning of surgery using a fresh bar of anti-bacterial soap (such as Dial) and clean washcloth.  Dry with a clean towel and dress in clean clothing.  Ask your surgeon if you will be receiving antibiotics prior to surgery.  Make sure you, your family, and all healthcare  providers clean their hands with soap and water or an alcohol based hand  before caring for you or your wound.    Day of surgery:  Your arrival time is approximately two hours before your scheduled surgery time.  Please note if you have an early arrival time the surgery doors do not open before 5:00 AM.  Upon arrival, a Pre-op nurse and Anesthesiologist will review your health history, obtain vital signs, and answer questions you may have.  The only belongings needed at this time will be your home medications and if applicable your C-PAP/BI-PAP machine.  A Pre-op nurse will start an IV and you may receive medication in preparation for surgery, including something to help you relax.      Please be aware that surgery does come with discomfort.  We want to make every effort to control your discomfort so please discuss any uncontrolled symptoms with your nurse.   Your doctor will most likely have prescribed pain medications.      If you are going home after surgery you will receive individualized written care instructions before being discharged.  A responsible adult must drive you to and from the hospital on the day of your surgery and ideally stay with you through the night.  Discharge prescriptions can be filled by the hospital pharmacy during regular pharmacy hours.  If you are having surgery late in the day/evening your prescription may be e-prescribed to your pharmacy.  Please verify your pharmacy hours or chose a 24 hour pharmacy to avoid not having access to your prescription because your pharmacy has closed for the day.        If you have any questions please call Pre-Admission Testing at [phone].  Deductibles and co-payments are collected on the day of service. Please be prepared to pay the required co-pay, deductible or deposit on the day of service as defined by your plan.    Call your surgeon immediately if you experience any of the following symptoms:  Sore Throat  Shortness of Breath or  difficulty breathing  Cough  Chills  Body soreness or muscle pain  Headache  Fever  New loss of taste or smell  Do not arrive for your surgery ill.  Your procedure will need to be rescheduled to another time.  You will need to call your physician before the day of surgery to avoid any unnecessary exposure to hospital staff as well as other patients.

## 2024-09-03 ENCOUNTER — ANESTHESIA (OUTPATIENT)
Dept: PERIOP | Facility: HOSPITAL | Age: 59
End: 2024-09-03
Payer: MEDICAID

## 2024-09-03 ENCOUNTER — HOSPITAL ENCOUNTER (OUTPATIENT)
Facility: HOSPITAL | Age: 59
Setting detail: HOSPITAL OUTPATIENT SURGERY
Discharge: HOME | End: 2024-09-03
Attending: HOSPITALIST | Admitting: HOSPITALIST
Payer: MEDICAID

## 2024-09-03 ENCOUNTER — ANESTHESIA EVENT (OUTPATIENT)
Dept: PERIOP | Facility: HOSPITAL | Age: 59
End: 2024-09-03
Payer: MEDICAID

## 2024-09-03 VITALS
RESPIRATION RATE: 16 BRPM | HEART RATE: 56 BPM | OXYGEN SATURATION: 97 % | TEMPERATURE: 97 F | WEIGHT: 183.64 LBS | DIASTOLIC BLOOD PRESSURE: 98 MMHG | SYSTOLIC BLOOD PRESSURE: 140 MMHG | BODY MASS INDEX: 25.71 KG/M2 | HEIGHT: 71 IN

## 2024-09-03 PROCEDURE — 25010000002 FENTANYL CITRATE (PF) 50 MCG/ML SOLUTION: Performed by: ANESTHESIOLOGY

## 2024-09-03 PROCEDURE — 25010000002 DEXAMETHASONE SODIUM PHOSPHATE 20 MG/5ML SOLUTION: Performed by: ANESTHESIOLOGY

## 2024-09-03 PROCEDURE — 25810000003 LACTATED RINGERS PER 1000 ML: Performed by: ANESTHESIOLOGY

## 2024-09-03 PROCEDURE — 25010000002 PROPOFOL 200 MG/20ML EMULSION: Performed by: ANESTHESIOLOGY

## 2024-09-03 PROCEDURE — 25010000002 ONDANSETRON PER 1 MG: Performed by: ANESTHESIOLOGY

## 2024-09-03 PROCEDURE — 25010000002 SUGAMMADEX 200 MG/2ML SOLUTION: Performed by: ANESTHESIOLOGY

## 2024-09-03 RX ORDER — HYDROCODONE BITARTRATE AND ACETAMINOPHEN 7.5; 325 MG/1; MG/1
1 TABLET ORAL EVERY 4 HOURS PRN
Status: DISCONTINUED | OUTPATIENT
Start: 2024-09-03 | End: 2024-09-03 | Stop reason: HOSPADM

## 2024-09-03 RX ORDER — SODIUM CHLORIDE 0.9 % (FLUSH) 0.9 %
3 SYRINGE (ML) INJECTION EVERY 12 HOURS SCHEDULED
Status: DISCONTINUED | OUTPATIENT
Start: 2024-09-03 | End: 2024-09-03 | Stop reason: HOSPADM

## 2024-09-03 RX ORDER — EPHEDRINE SULFATE 50 MG/ML
5 INJECTION, SOLUTION INTRAVENOUS ONCE AS NEEDED
Status: DISCONTINUED | OUTPATIENT
Start: 2024-09-03 | End: 2024-09-03 | Stop reason: HOSPADM

## 2024-09-03 RX ORDER — ROCURONIUM BROMIDE 10 MG/ML
INJECTION, SOLUTION INTRAVENOUS AS NEEDED
Status: DISCONTINUED | OUTPATIENT
Start: 2024-09-03 | End: 2024-09-03 | Stop reason: SURG

## 2024-09-03 RX ORDER — PROMETHAZINE HYDROCHLORIDE 25 MG/1
25 SUPPOSITORY RECTAL ONCE AS NEEDED
Status: DISCONTINUED | OUTPATIENT
Start: 2024-09-03 | End: 2024-09-03 | Stop reason: HOSPADM

## 2024-09-03 RX ORDER — FENTANYL CITRATE 50 UG/ML
INJECTION, SOLUTION INTRAMUSCULAR; INTRAVENOUS AS NEEDED
Status: DISCONTINUED | OUTPATIENT
Start: 2024-09-03 | End: 2024-09-03 | Stop reason: SURG

## 2024-09-03 RX ORDER — FERROUS GLUCONATE 324(38)MG
1 TABLET ORAL DAILY
COMMUNITY

## 2024-09-03 RX ORDER — FAMOTIDINE 10 MG/ML
20 INJECTION, SOLUTION INTRAVENOUS ONCE
Status: COMPLETED | OUTPATIENT
Start: 2024-09-03 | End: 2024-09-03

## 2024-09-03 RX ORDER — FENTANYL CITRATE 50 UG/ML
50 INJECTION, SOLUTION INTRAMUSCULAR; INTRAVENOUS ONCE AS NEEDED
Status: DISCONTINUED | OUTPATIENT
Start: 2024-09-03 | End: 2024-09-03 | Stop reason: HOSPADM

## 2024-09-03 RX ORDER — PROPOFOL 10 MG/ML
INJECTION, EMULSION INTRAVENOUS AS NEEDED
Status: DISCONTINUED | OUTPATIENT
Start: 2024-09-03 | End: 2024-09-03 | Stop reason: SURG

## 2024-09-03 RX ORDER — DROPERIDOL 2.5 MG/ML
0.62 INJECTION, SOLUTION INTRAMUSCULAR; INTRAVENOUS
Status: DISCONTINUED | OUTPATIENT
Start: 2024-09-03 | End: 2024-09-03 | Stop reason: HOSPADM

## 2024-09-03 RX ORDER — HYDROCODONE BITARTRATE AND ACETAMINOPHEN 5; 325 MG/1; MG/1
1 TABLET ORAL ONCE AS NEEDED
Status: DISCONTINUED | OUTPATIENT
Start: 2024-09-03 | End: 2024-09-03 | Stop reason: HOSPADM

## 2024-09-03 RX ORDER — SILDENAFIL CITRATE 20 MG/1
20 TABLET ORAL AS NEEDED
COMMUNITY
Start: 2024-08-12

## 2024-09-03 RX ORDER — FLUMAZENIL 0.1 MG/ML
0.2 INJECTION INTRAVENOUS AS NEEDED
Status: DISCONTINUED | OUTPATIENT
Start: 2024-09-03 | End: 2024-09-03 | Stop reason: HOSPADM

## 2024-09-03 RX ORDER — LABETALOL HYDROCHLORIDE 5 MG/ML
5 INJECTION, SOLUTION INTRAVENOUS
Status: DISCONTINUED | OUTPATIENT
Start: 2024-09-03 | End: 2024-09-03 | Stop reason: HOSPADM

## 2024-09-03 RX ORDER — FENTANYL CITRATE 50 UG/ML
25 INJECTION, SOLUTION INTRAMUSCULAR; INTRAVENOUS
Status: DISCONTINUED | OUTPATIENT
Start: 2024-09-03 | End: 2024-09-03 | Stop reason: HOSPADM

## 2024-09-03 RX ORDER — LIDOCAINE HYDROCHLORIDE 10 MG/ML
0.5 INJECTION, SOLUTION INFILTRATION; PERINEURAL ONCE AS NEEDED
Status: DISCONTINUED | OUTPATIENT
Start: 2024-09-03 | End: 2024-09-03 | Stop reason: HOSPADM

## 2024-09-03 RX ORDER — NALOXONE HCL 0.4 MG/ML
0.2 VIAL (ML) INJECTION AS NEEDED
Status: DISCONTINUED | OUTPATIENT
Start: 2024-09-03 | End: 2024-09-03 | Stop reason: HOSPADM

## 2024-09-03 RX ORDER — IPRATROPIUM BROMIDE AND ALBUTEROL SULFATE 2.5; .5 MG/3ML; MG/3ML
3 SOLUTION RESPIRATORY (INHALATION) ONCE AS NEEDED
Status: DISCONTINUED | OUTPATIENT
Start: 2024-09-03 | End: 2024-09-03 | Stop reason: HOSPADM

## 2024-09-03 RX ORDER — MIDAZOLAM HYDROCHLORIDE 1 MG/ML
1 INJECTION INTRAMUSCULAR; INTRAVENOUS
Status: DISCONTINUED | OUTPATIENT
Start: 2024-09-03 | End: 2024-09-03 | Stop reason: HOSPADM

## 2024-09-03 RX ORDER — ONDANSETRON 2 MG/ML
INJECTION INTRAMUSCULAR; INTRAVENOUS AS NEEDED
Status: DISCONTINUED | OUTPATIENT
Start: 2024-09-03 | End: 2024-09-03 | Stop reason: SURG

## 2024-09-03 RX ORDER — HYDROMORPHONE HYDROCHLORIDE 1 MG/ML
0.25 INJECTION, SOLUTION INTRAMUSCULAR; INTRAVENOUS; SUBCUTANEOUS
Status: DISCONTINUED | OUTPATIENT
Start: 2024-09-03 | End: 2024-09-03 | Stop reason: HOSPADM

## 2024-09-03 RX ORDER — ONDANSETRON 2 MG/ML
4 INJECTION INTRAMUSCULAR; INTRAVENOUS ONCE AS NEEDED
Status: DISCONTINUED | OUTPATIENT
Start: 2024-09-03 | End: 2024-09-03 | Stop reason: HOSPADM

## 2024-09-03 RX ORDER — HYDRALAZINE HYDROCHLORIDE 20 MG/ML
5 INJECTION INTRAMUSCULAR; INTRAVENOUS
Status: DISCONTINUED | OUTPATIENT
Start: 2024-09-03 | End: 2024-09-03 | Stop reason: HOSPADM

## 2024-09-03 RX ORDER — SODIUM CHLORIDE 0.9 % (FLUSH) 0.9 %
3-10 SYRINGE (ML) INJECTION AS NEEDED
Status: DISCONTINUED | OUTPATIENT
Start: 2024-09-03 | End: 2024-09-03 | Stop reason: HOSPADM

## 2024-09-03 RX ORDER — DIPHENHYDRAMINE HYDROCHLORIDE 50 MG/ML
12.5 INJECTION INTRAMUSCULAR; INTRAVENOUS
Status: DISCONTINUED | OUTPATIENT
Start: 2024-09-03 | End: 2024-09-03 | Stop reason: HOSPADM

## 2024-09-03 RX ORDER — PROMETHAZINE HYDROCHLORIDE 25 MG/1
25 TABLET ORAL ONCE AS NEEDED
Status: DISCONTINUED | OUTPATIENT
Start: 2024-09-03 | End: 2024-09-03 | Stop reason: HOSPADM

## 2024-09-03 RX ORDER — DEXAMETHASONE SODIUM PHOSPHATE 4 MG/ML
INJECTION, SOLUTION INTRA-ARTICULAR; INTRALESIONAL; INTRAMUSCULAR; INTRAVENOUS; SOFT TISSUE AS NEEDED
Status: DISCONTINUED | OUTPATIENT
Start: 2024-09-03 | End: 2024-09-03 | Stop reason: SURG

## 2024-09-03 RX ORDER — LIDOCAINE HYDROCHLORIDE 20 MG/ML
INJECTION, SOLUTION INFILTRATION; PERINEURAL AS NEEDED
Status: DISCONTINUED | OUTPATIENT
Start: 2024-09-03 | End: 2024-09-03 | Stop reason: SURG

## 2024-09-03 RX ORDER — SODIUM CHLORIDE, SODIUM LACTATE, POTASSIUM CHLORIDE, CALCIUM CHLORIDE 600; 310; 30; 20 MG/100ML; MG/100ML; MG/100ML; MG/100ML
9 INJECTION, SOLUTION INTRAVENOUS CONTINUOUS
Status: DISCONTINUED | OUTPATIENT
Start: 2024-09-03 | End: 2024-09-03 | Stop reason: HOSPADM

## 2024-09-03 RX ADMIN — ONDANSETRON 4 MG: 2 INJECTION INTRAMUSCULAR; INTRAVENOUS at 08:48

## 2024-09-03 RX ADMIN — ROCURONIUM BROMIDE 50 MG: 10 INJECTION, SOLUTION INTRAVENOUS at 08:27

## 2024-09-03 RX ADMIN — LIDOCAINE HYDROCHLORIDE 20 MG: 20 INJECTION, SOLUTION INFILTRATION; PERINEURAL at 08:29

## 2024-09-03 RX ADMIN — FAMOTIDINE 20 MG: 10 INJECTION INTRAVENOUS at 07:26

## 2024-09-03 RX ADMIN — SUGAMMADEX 200 MG: 100 INJECTION, SOLUTION INTRAVENOUS at 08:48

## 2024-09-03 RX ADMIN — PROPOFOL 120 MG: 10 INJECTION, EMULSION INTRAVENOUS at 08:26

## 2024-09-03 RX ADMIN — SUGAMMADEX 200 MG: 100 INJECTION, SOLUTION INTRAVENOUS at 08:53

## 2024-09-03 RX ADMIN — FENTANYL CITRATE 50 MCG: 50 INJECTION, SOLUTION INTRAMUSCULAR; INTRAVENOUS at 08:23

## 2024-09-03 RX ADMIN — Medication 3 ML: at 07:26

## 2024-09-03 RX ADMIN — SODIUM CHLORIDE, POTASSIUM CHLORIDE, SODIUM LACTATE AND CALCIUM CHLORIDE 9 ML/HR: 600; 310; 30; 20 INJECTION, SOLUTION INTRAVENOUS at 07:26

## 2024-09-03 RX ADMIN — PROPOFOL 50 MG: 10 INJECTION, EMULSION INTRAVENOUS at 08:27

## 2024-09-03 RX ADMIN — PROPOFOL 30 MG: 10 INJECTION, EMULSION INTRAVENOUS at 08:57

## 2024-09-03 RX ADMIN — DEXAMETHASONE SODIUM PHOSPHATE 4 MG: 4 INJECTION, SOLUTION INTRAMUSCULAR; INTRAVENOUS at 08:31

## 2024-09-03 RX ADMIN — LIDOCAINE HYDROCHLORIDE 80 MG: 20 INJECTION, SOLUTION INFILTRATION; PERINEURAL at 08:26

## 2024-09-03 NOTE — ANESTHESIA POSTPROCEDURE EVALUATION
"Patient: Oliverio Corrales    Procedure Summary       Date: 09/03/24 Room / Location: [facility] OR  / [facility] OR    Anesthesia Start: 0820 Anesthesia Stop: 0907    Procedure: BRONCHOSCOPY WITH APC (Bronchus) Diagnosis:     Surgeons: Gene Gomez MD Provider: William Mcgill DO    Anesthesia Type: general ASA Status: 3            Anesthesia Type: general    Vitals  Vitals Value Taken Time   /81 09/03/24 1000   Temp 36.1 °C (97 °F) 09/03/24 0945   Pulse 59 09/03/24 1004   Resp 16 09/03/24 0945   SpO2 98 % 09/03/24 1004   Vitals shown include unfiled device data.        Post Anesthesia Care and Evaluation    Patient location during evaluation: bedside  Patient participation: complete - patient participated  Level of consciousness: awake and alert  Pain management: adequate    Airway patency: patent  Anesthetic complications: No anesthetic complications  PONV Status: controlled  Cardiovascular status: acceptable and hemodynamically stable  Respiratory status: acceptable, spontaneous ventilation and nonlabored ventilation  Hydration status: acceptable    Comments: /98   Pulse 56   Temp 36.1 °C (97 °F)   Resp 16   Ht 180.3 cm (71\")   Wt 83.3 kg (183 lb 10.3 oz)   SpO2 97%   BMI 25.61 kg/m²           "

## 2024-09-03 NOTE — PROCEDURES
Bronchoscopy Procedure Note    Procedure:  Bronchoscopy, Diagnostic  Bronchoscopy, Therapeutic  Argon plasma coagulation    Pre-Operative Diagnosis: Broncholith    Post-Operative Diagnosis: Same    Indication: Broncholith    Anesthesia: General Anesthesia    Procedure Details: Patient was consented for the procedure with all risk and benefit of the procedure explained in detail.  Patient was given the opportunity to ask questions and all concerns were answered.  The bronchocope was inserted into the main airway via the endotracheal tube. An anatomical survey was done of the main airways and the subsegmental bronchus to at least the first subsegmental level of all five lobes of both lungs.  The findings are reported below.     Findings:    Bronchoscope was passed through the endotracheal tube into the trachea.  Trachea was of normal size and caliber without any lesions noted.  Left airways were evaluated with the left mainstem clear and left upper lobe and lower lobe patent to at least the first subsegmental bronchus.  Right medial mainstem bronchus with plaque-like lesion with raised mucosa and small sliver of broncholith noted.  Friable on soft touch.  Argon plasma coagulation was used to coagulate any bleeding.  No attempts were made to biopsy and remove the broncholith seeing that it was deeply ingrained into the airway wall and subcarinal space.  Right upper, middle, lower lobe were all patent without any lesions noted.  Patient tolerated procedure well.  No bleeding at the end of the case.  Transferred to PACU for further monitoring.    Estimated Blood Loss:  Minimal           Specimens:    None                Complications:  None; patient tolerated the procedure well.           Disposition: PACU - hemodynamically stable.    Patient tolerated the procedure well.    Gene Gomez MD  9/3/2024  16:06 EDT

## 2024-09-03 NOTE — ANESTHESIA PROCEDURE NOTES
Airway  Urgency: elective    Date/Time: 9/3/2024 8:28 AM  End Time:9/3/2024 8:29 AM  Airway not difficult    General Information and Staff    Patient location during procedure: OR  Anesthesiologist: William Mcgill DO    Indications and Patient Condition  Indications for airway management: airway protection    Preoxygenated: yes  Mask difficulty assessment: 2 - vent by mask + OA or adjuvant +/- NMBA    Final Airway Details  Final airway type: endotracheal airway      Successful airway: ETT  Cuffed: yes   Successful intubation technique: direct laryngoscopy  Endotracheal tube insertion site: oral  Blade: Cisco  Blade size: 3  ETT size (mm): 8.5  Cormack-Lehane Classification: grade IIa - partial view of glottis  Placement verified by: capnometry   Cuff volume (mL): 8  Measured from: lips  ETT/EBT  to lips (cm): 24  Number of attempts at approach: 1  Assessment: lips, teeth, and gum same as pre-op and atraumatic intubation

## 2024-09-03 NOTE — SIGNIFICANT NOTE
09/03/24 0712   Patient Belongings:   Patient Belongings  Clothing   Piercings Remaining No   Clothing Pants;Shirt;Footwear;Socks;Underpants   Clothing sent to [employer]   Patient Medications   Medications brought by patient? No     [employer] [facility] Main Pre/Post

## 2024-09-03 NOTE — ANESTHESIA PREPROCEDURE EVALUATION
Anesthesia Evaluation     Patient summary reviewed and Nursing notes reviewed   no history of anesthetic complications:   NPO Solid Status: > 8 hours  NPO Liquid Status: > 2 hours           Airway   Mallampati: I  TM distance: >3 FB  Neck ROM: full  Dental    (+) implants    Comment: Risks of dental injury discussed    Pulmonary - normal exam   (+) pulmonary embolism,shortness of breath, sleep apnea  (-) rhonchi, decreased breath sounds, wheezes, rales, stridor  Cardiovascular - normal exam    NYHA Classification: I  ECG reviewed  Rhythm: regular  Rate: normal    (+) CAD, dysrhythmias Atrial Fib, PVD  (-) murmur, weak pulses, friction rub, systolic click, carotid bruits, JVD, peripheral edema    ROS comment: NSR    Neuro/Psych  (+) headaches  GI/Hepatic/Renal/Endo    (+) hiatal hernia, GERD    Musculoskeletal (-) negative ROS    Abdominal    Substance History - negative use     OB/GYN negative ob/gyn ROS         Other - negative ROS                         Anesthesia Plan    ASA 3     general     (Wants a slower induction)  intravenous induction     Anesthetic plan, risks, benefits, and alternatives have been provided, discussed and informed consent has been obtained with: patient.        CODE STATUS:

## 2024-09-03 NOTE — H&P
[place] [facility]  HISTORY AND PHYSICAL   [facility]        Patient Identification:  Name: Oliverio Corrales  Age: 59 y.o.  Sex: male  :  1965  MRN: [id number]                     Primary Care Physician: Nathanael Phillips MD    History of Present Illness:   Oliverio Corrales is a 59 y.o. male who presents today for bronchoscopy.     Past Medical History:  Past Medical History:   Diagnosis Date    Anesthesia     STATES WHEN HE HAS  ANESTHESIA AND  IS PUT TO SLEEP TOO FAST, GETS HEADACHE    ASHD (arteriosclerotic heart disease) 10/08/2018    Atrial fibrillation 10/08/2018    Attention deficit hyperactivity disorder 2022    Broncholithiasis     Dry eyes, bilateral     GERD (gastroesophageal reflux disease)     H/O removal of cyst 2024    CHEST, REDDENED AT SITE ( STATES FROM STITCHES )    Hearing loss 10/16/2018    Osteoarthritis of left glenohumeral joint 10/03/2023    Personal history of pulmonary embolism 2017    Peyronie's disease 2020    Pulmonary embolism     Reflux esophagitis     Sleep apnea 2017    DOES NOT USE MACHINE    Thrombosis of superficial vein of penis 2021     Past Surgical History:  Past Surgical History:   Procedure Laterality Date    BRONCHOSCOPY N/A 2024    Procedure: BRONCHOSCOPY WITH BRONCHIAL AVEOLAR LAVAGE, brushing and biopsies;  Surgeon: Alexander Matthews MD;  Location: [facility] ENDOSCOPY;  Service: Pulmonary;  Laterality: N/A;  pre: broncholith  post:endobronchial lesion/mass    BRONCHOSCOPY N/A 2024    Procedure: BRONCHOSCOPY WITH BRONCHIAL AVEOLAR LAVAGE and biopsy;  Surgeon: Alexander Matthews MD;  Location: [facility] ENDOSCOPY;  Service: Pulmonary;  Laterality: N/A;  broncholiths    CHOLECYSTECTOMY      COLONOSCOPY      CYST REMOVAL  2024    ON MIDDLE OF CHEST    ENDOSCOPY      FACELIFT N/A 2021    Procedure: FULL FACE LIFT WITH  FAT TRANSFER;  Surgeon: Roberto Connelly MD;  Location: [facility] OR;  Service:  "New Image;  Laterality: N/A;    INGUINAL HERNIA REPAIR      NISSEN FUNDOPLICATION N/A 01/03/2019    Procedure: REDO LAPAROSCOPIC NISSEN FUNDOPLICATION  WITH DAVINCI ROBOT AND MESH;  Surgeon: Qi Beltre MD;  Location: [facility] MAIN OR;  Service: DaVinci    SHOULDER SURGERY Left     TONSILLECTOMY      TRUNK LESION/CYST EXCISION N/A 06/03/2022    Procedure: INCISION AND DRAINAGE INFECTED CYST OF CHEST;  Surgeon: Roberto Connelly MD;  Location: [facility] MAIN OR;  Service: Plastics;  Laterality: N/A;      Home Meds:  Medications Prior to Admission   Medication Sig Dispense Refill Last Dose    Biotin w/ Vitamins C & E (Hair/Skin/Nails) 1250-7.5-7.5 MCG-MG-UNT chewable tablet Chew 5 mg Every Morning.   Past Week    Carbonyl Iron 15 MG chewable tablet Chew 65 mg As Needed. TAKES ONE OR TWO A DAY   9/2/2024    cyanocobalamin (VITAMIN B-12) 1000 MCG tablet Take 1 tablet by mouth Daily.   9/2/2024 at 0800    olopatadine (PATANOL) 0.1 % ophthalmic solution Administer 1 drop to both eyes 2 (Two) Times a Day. 5 mL 0 9/2/2024 at 0800    PERFLUOROHEXYLOCTANE OP Apply 1 drop to eye(s) as directed by provider 4 (Four) Times a Day.   9/2/2024 at 0800    sildenafil (REVATIO) 20 MG tablet Take 1 tablet by mouth As Needed (Sexual Activity).       tobramycin in sterile water (preservative free) injection-balanced salts ophthalmic solution Apply 1-2 drops to eye(s) as directed by provider Every 4 (Four) Hours. 5 mL 0 9/2/2024 at 0800    ferrous gluconate (FERGON) 324 MG tablet Take 1 tablet by mouth Daily.       hydroCHLOROthiazide 12.5 MG tablet Take 1 tablet by mouth Daily. 30 tablet 3        Allergies:  Allergies   Allergen Reactions    Hydrocortisone Nausea Only, Palpitations and Confusion     \"injecting cortisone\", can tolerate skin cream just fine. Patient states he can take it heart races    Cortisone Palpitations    Plasticized Base [Plastibase] Rash     SWELLING AT SITE, STATES ALLERGY TO PLASTIC SCREWS, TAPE    Suture Rash     " STATES ALLERGY TO PLASTIC SUTURES     Amoxicillin GI Intolerance    Erythromycin GI Intolerance    Penicillins Other (See Comments)     PT reports Pustules on head and hair loss     Sulfa Antibiotics GI Intolerance     Immunizations:  Immunization History   Administered Date(s) Administered    COVID-19 (MISTI) 05/25/2021    Flu Vaccine Intradermal Quad 18-64YR 11/14/2014    Influenza Seasonal Injectable 11/14/2014     Social History:   Social History     Social History Narrative    Not on file     Social History     Tobacco Use    Smoking status: Never     Passive exposure: Never    Smokeless tobacco: Never   Substance Use Topics    Alcohol use: No     Family History:  Family History   Problem Relation Age of Onset    Lung disease Father     Malig Hyperthermia Neg Hx         Objective:  Vitals:    09/03/24 0703   BP: 122/87   Pulse: 58   Resp: 14   Temp: 98.2 °F (36.8 °C)   SpO2: 97%       Exam:    General: Alert, nontoxic, NAD  HEENT: NC/AT, EOMI, MMM  Neck: Supple, trachea midline  Cardiac: RRR, no murmur, gallops, rubs  Pulmonary: Clear to auscultation bilaterally, no adventitious breath sounds, normal respiratory effort  GI: Soft, non-tender, non-distended, normal bowel sounds  Extremities: Warm, well perfused, no LE edema  Skin: no visible rash  Neuro: CN II - XII grossly intact  Psychiatry: Normal mood and affect      Data Review:    Labs:  Results from last 7 days   Lab Units 08/30/24  1316   WBC 10*3/mm3 9.17   HEMOGLOBIN g/dL 15.5   PLATELETS 10*3/mm3 198     Results from last 7 days   Lab Units 08/30/24  1316   SODIUM mmol/L 139   POTASSIUM mmol/L 3.9   CHLORIDE mmol/L 102   CO2 mmol/L 24.7   BUN mg/dL 21   CREATININE mg/dL 1.01   GLUCOSE mg/dL 92   CALCIUM mg/dL 9.2   Estimated Creatinine Clearance: 90.5 mL/min (by C-G formula based on SCr of 1.01 mg/dL).    Results from last 7 days   Lab Units 08/30/24  1316   PLATELETS 10*3/mm3 198             Imaging:  Chest imaging reviewed pertinent to  bronchoscopy      Assessment / Plan:    Proceed with bronchoscopy today.     Gene Gomez MD  [facility] [facility], [facility]  Pulmonary and Critical Care Medicine, Interventional Pulmonology    9/3/2024  07:39 EDT

## 2024-09-04 LAB — FUNGUS WND CULT: NORMAL

## 2024-10-04 ENCOUNTER — HOSPITAL ENCOUNTER (EMERGENCY)
Facility: HOSPITAL | Age: 59
Discharge: HOME | End: 2024-10-04
Attending: EMERGENCY MEDICINE
Payer: MEDICAID

## 2024-10-04 VITALS
HEART RATE: 75 BPM | OXYGEN SATURATION: 98 % | TEMPERATURE: 97.2 F | SYSTOLIC BLOOD PRESSURE: 138 MMHG | RESPIRATION RATE: 18 BRPM | DIASTOLIC BLOOD PRESSURE: 90 MMHG

## 2024-10-04 DIAGNOSIS — L98.9 SCALP LESION: ICD-10-CM

## 2024-10-04 DIAGNOSIS — L98.9 FACIAL LESION: Primary | ICD-10-CM

## 2024-10-04 PROCEDURE — 99282 EMERGENCY DEPT VISIT SF MDM: CPT

## 2024-10-04 RX ORDER — CEPHALEXIN 500 MG/1
500 CAPSULE ORAL 3 TIMES DAILY
Qty: 21 CAPSULE | Refills: 0 | Status: SHIPPED | OUTPATIENT
Start: 2024-10-04 | End: 2024-10-08

## 2024-10-04 RX ORDER — VALACYCLOVIR HYDROCHLORIDE 1 G/1
1000 TABLET, FILM COATED ORAL 3 TIMES DAILY
Qty: 21 TABLET | Refills: 0 | Status: SHIPPED | OUTPATIENT
Start: 2024-10-04 | End: 2024-10-08

## 2024-10-04 NOTE — ED TRIAGE NOTES
"Patient to ED via pv from home c/o \"lesions all over my head\" and a bug bite on the left side of face.   "

## 2024-10-04 NOTE — ED PROVIDER NOTES
MD ATTESTATION NOTE    SHARED VISIT: This visit was performed by BOTH a physician and an APC. The substantive portion of the medical decision making was performed by this attesting physician who made or approved the management plan and takes responsibility for patient management. All studies documented in the APC note (if performed) were independently interpreted by me.    The ANNABELLA and I have discussed this patient's history, physical exam, MDM, and treatment plan.  I have reviewed the documentation and personally had a face to face interaction with the patient. The attached note describes my personal findings.      Oliverio Corrales is a 59 y.o. male who presents to the ED c/o acute rash to the left side of face.  He states that this developed over the past 2 days.  He reports having some discomfort to light touch of the left side of the face and scalp.    On exam:  GENERAL: not distressed  HENT: nares patent  EYES: no scleral icterus, no conjunctival injection  CV: regular rhythm, regular rate  RESPIRATORY: normal effort  ABDOMEN: soft, nontender  MUSCULOSKELETAL: no deformity  NEURO: alert, moves all extremities, follows commands  SKIN: There is a vesicular lesion to the left cheek with surrounding erythema as well as vesicular pair of lesions to the left temporal region within the hair with surrounding erythema.  There is also faint diffuse erythema to the left temporal region of the face.    Labs  No results found for this or any previous visit (from the past 24 hour(s)).    Radiology  No Radiology Exams Resulted Within Past 24 Hours    Medications given in the ED:  Medications - No data to display    Orders placed during this visit:  No orders of the defined types were placed in this encounter.      Medical Decision Making:       Differential diagnosis:  Cellulitis, zoster    I believe empiric treatment with valacyclovir is clinically warranted along with antibiotic.  Patient is admittedly a poor historian.   Therefore, I think broad coverage is needed as the patient cannot give me a convincing story about the etiology of his symptoms or whether or not his face even got hit by brush.    Diagnosis  Final diagnoses:   Facial lesion   Scalp lesion          Deo Blair II, MD  10/04/24 1131

## 2024-10-04 NOTE — ED PROVIDER NOTES
" EMERGENCY DEPARTMENT ENCOUNTER      Room Number:  03/03  PCP: Nathanael Phillips MD  Independent Historians: Patient  Patient Care Team:  Nathanael Phillips MD as PCP - General (Nurse Practitioner)       HPI:  Chief Complaint: Skin lesions    A complete HPI/ROS/PMH/PSH/SH/FH are unobtainable due to: None    Chronic or social conditions impacting patient care (Social Determinants of Health): None      Context: Oliverio Corrales is a 59 y.o. male with a PMH significant for pulmonary emboli, chronic migraines, CAD, GERD who presents to the ED c/o acute discomfort surrounding a couple of skin lesions to the left side of his face.  The patient states that he has been working outside more frequently lately as he does outdoor restoration projects through the fall.  He was working on a gutter on Wednesday and reports that he got some brush in his face and on his head.  He started noticing some mild discomfort to the left-sided scalp as well as the left side of his face surrounding a couple of superficial lesions.  He reports that he had a similar presentation this time last year when he was doing similar projects and sustained a couple of \"skin infections\" which cleared up with antibiotics.  Denies report of fever, chills.  No drainage from these wounds.      Upon review of prior external notes (non-ED) -and- Review of prior external test results outside of this encounter it appears the patient was evaluated in the office with urology for erectile dysfunction on 8/12/2024.  The patient had a normal CBC and C. difficile on 1/21/2024.      PAST MEDICAL HISTORY  Active Ambulatory Problems     Diagnosis Date Noted    Bilateral pulmonary embolism 01/02/2017    HH (hiatus hernia) 10/30/2018    Hiatal hernia with gastroesophageal reflux disease and esophagitis 01/03/2019    Tachycardia 05/22/2017    Pleuritic chest pain 05/22/2017    Intractable chronic migraine without aura and without status migrainosus 09/08/2016    Hx pulmonary " embolism 05/22/2017    Dyspnea 10/08/2018    Complex tear of medial meniscus of left knee as current injury 09/18/2020    Chronic neck pain 08/31/2015    Biceps tendinosis of right shoulder 08/27/2020    ASHD (arteriosclerotic heart disease) 10/08/2018    Atrial fibrillation 10/08/2018    Acquired trigger finger of left middle finger 01/14/2020    Cellulitis 10/04/2023    Acute on chronic colitis 05/26/2022    Lymphadenitis 11/30/2015    Attention deficit hyperactivity disorder 05/26/2022    Cubital tunnel syndrome 03/28/2017    Hearing loss 10/16/2018    Inguinal hernia 01/18/2017    Injury of tendon of rotator cuff 09/25/2023    Macular degeneration 10/04/2023    Nontraumatic rupture of long head of biceps tendon of left shoulder 10/03/2023    Osteoarthritis of left glenohumeral joint 10/03/2023    Other specified postprocedural states 12/04/2014    Peyronie's disease 08/03/2020    Thrombosis of superficial vein of penis 02/04/2021    Sleep apnea 02/27/2017    Rotator cuff syndrome 09/02/2015    Coronary arteriosclerosis 10/08/2018    Pulmonary embolism 01/02/2017    Gastroesophageal reflux disease 10/08/2018    Personal history of pulmonary embolism 05/22/2017    Intractable chronic migraine without aura 09/08/2016    Pleuritic pain 05/22/2017    H/O cosmetic surgery- facelift 2022 10/04/2023    History of Nissen fundoplication 10/04/2023    Diarrhea 01/21/2024     Resolved Ambulatory Problems     Diagnosis Date Noted    No Resolved Ambulatory Problems     Past Medical History:   Diagnosis Date    Anesthesia     Broncholithiasis     Dry eyes, bilateral     GERD (gastroesophageal reflux disease)     H/O removal of cyst 06/2024    Reflux esophagitis          PAST SURGICAL HISTORY  Past Surgical History:   Procedure Laterality Date    BRONCHOSCOPY N/A 06/11/2024    Procedure: BRONCHOSCOPY WITH BRONCHIAL AVEOLAR LAVAGE, brushing and biopsies;  Surgeon: Alexander Matthews MD;  Location: [facility] ENDOSCOPY;  Service:  Pulmonary;  Laterality: N/A;  pre: broncholith  post:endobronchial lesion/mass    BRONCHOSCOPY N/A 08/07/2024    Procedure: BRONCHOSCOPY WITH BRONCHIAL AVEOLAR LAVAGE and biopsy;  Surgeon: Alexander Matthews MD;  Location: [facility] ENDOSCOPY;  Service: Pulmonary;  Laterality: N/A;  broncholiths    BRONCHOSCOPY N/A 9/3/2024    Procedure: BRONCHOSCOPY WITH APC;  Surgeon: Gene Gomez MD;  Location: [facility] MAIN OR;  Service: Pulmonary;  Laterality: N/A;    CHOLECYSTECTOMY      COLONOSCOPY      CYST REMOVAL  06/2024    ON MIDDLE OF CHEST    ENDOSCOPY      FACELIFT N/A 01/29/2021    Procedure: FULL FACE LIFT WITH  FAT TRANSFER;  Surgeon: Roberto Connelly MD;  Location: [facility] MAIN OR;  Service: New Image;  Laterality: N/A;    INGUINAL HERNIA REPAIR      NISSEN FUNDOPLICATION N/A 01/03/2019    Procedure: REDO LAPAROSCOPIC NISSEN FUNDOPLICATION  WITH DAVINCI ROBOT AND MESH;  Surgeon: Qi Beltre MD;  Location: [facility] MAIN OR;  Service: DaVinci    SHOULDER SURGERY Left     TONSILLECTOMY      TRUNK LESION/CYST EXCISION N/A 06/03/2022    Procedure: INCISION AND DRAINAGE INFECTED CYST OF CHEST;  Surgeon: Roberto Connelly MD;  Location: [facility] MAIN OR;  Service: Plastics;  Laterality: N/A;         FAMILY HISTORY  Family History   Problem Relation Age of Onset    Lung disease Father     Malig Hyperthermia Neg Hx          SOCIAL HISTORY  Social History     Socioeconomic History    Marital status: Single   Tobacco Use    Smoking status: Never     Passive exposure: Never    Smokeless tobacco: Never   Vaping Use    Vaping status: Never Used   Substance and Sexual Activity    Alcohol use: No    Drug use: No    Sexual activity: Defer     Birth control/protection: None         ALLERGIES  Hydrocortisone, Cortisone, Plasticized base [plastibase], Suture, Amoxicillin, Erythromycin, Penicillins, and Sulfa antibiotics      REVIEW OF SYSTEMS  Included in HPI  All systems reviewed and negative except for those discussed in  HPI.      PHYSICAL EXAM    I have reviewed the triage vital signs and nursing notes.    ED Triage Vitals   Temp Pulse Resp BP SpO2   -- -- -- -- --      Temp src Heart Rate Source Patient Position BP Location FiO2 (%)   -- -- -- -- --       Physical Exam  Constitutional:       General: He is not in acute distress.     Appearance: He is well-developed.   HENT:      Head: Normocephalic and atraumatic.     Eyes:      General: No scleral icterus.     Conjunctiva/sclera: Conjunctivae normal.   Neck:      Trachea: No tracheal deviation.   Cardiovascular:      Rate and Rhythm: Normal rate and regular rhythm.   Pulmonary:      Effort: Pulmonary effort is normal.      Breath sounds: Normal breath sounds.   Abdominal:      Palpations: Abdomen is soft.      Tenderness: There is no abdominal tenderness. There is no guarding.   Musculoskeletal:         General: No deformity.      Cervical back: Normal range of motion.   Lymphadenopathy:      Cervical: No cervical adenopathy.   Skin:     General: Skin is warm and dry.   Neurological:      Mental Status: He is alert and oriented to person, place, and time.   Psychiatric:         Behavior: Behavior normal.         Vital signs and nursing notes reviewed.      PPE: I wore a surgical mask throughout my encounters with the pt. I performed hand hygiene on entry into the pt room and upon exit.     LAB RESULTS  No results found for this or any previous visit (from the past 24 hour(s)).      RADIOLOGY  No Radiology Exams Resulted Within Past 24 Hours      MEDICATIONS GIVEN IN ER  Medications - No data to display      ORDERS PLACED DURING THIS VISIT:  No orders of the defined types were placed in this encounter.        OUTPATIENT MEDICATION MANAGEMENT:  No current Epic-ordered facility-administered medications on file.     Current Outpatient Medications Ordered in Epic   Medication Sig Dispense Refill    Biotin w/ Vitamins C & E (Hair/Skin/Nails) 1250-7.5-7.5 MCG-MG-UNT chewable tablet Chew  5 mg Every Morning.      Carbonyl Iron 15 MG chewable tablet Chew 65 mg As Needed. TAKES ONE OR TWO A DAY      cephalexin (KEFLEX) 500 MG capsule Take 1 capsule by mouth 3 (Three) Times a Day. 21 capsule 0    cyanocobalamin (VITAMIN B-12) 1000 MCG tablet Take 1 tablet by mouth Daily.      ferrous gluconate (FERGON) 324 MG tablet Take 1 tablet by mouth Daily.      hydroCHLOROthiazide 12.5 MG tablet Take 1 tablet by mouth Daily. 30 tablet 3    olopatadine (PATANOL) 0.1 % ophthalmic solution Administer 1 drop to both eyes 2 (Two) Times a Day. 5 mL 0    PERFLUOROHEXYLOCTANE OP Apply 1 drop to eye(s) as directed by provider 4 (Four) Times a Day.      sildenafil (REVATIO) 20 MG tablet Take 1 tablet by mouth As Needed (Sexual Activity).      tobramycin in sterile water (preservative free) injection-balanced salts ophthalmic solution Apply 1-2 drops to eye(s) as directed by provider Every 4 (Four) Hours. 5 mL 0    valACYclovir (VALTREX) 1000 MG tablet Take 1 tablet by mouth 3 (Three) Times a Day. 21 tablet 0            PROGRESS, DATA ANALYSIS, CONSULTS, AND MEDICAL DECISION MAKING  All labs have been independently interpreted by me.  All radiology studies have been reviewed by me. All EKG's have been independently viewed and interpreted by me.  Discussion below represents my analysis of pertinent findings related to patient's condition, differential diagnosis, treatment plan and final disposition.    Patient presentation and evaluation most consistent with superficial lesions to the left side of face and left side of scalp.  Symptoms appear consistent with early shingles infection and he will be covered with antivirals.  He also is concerned that symptoms are very similar to prior episodes of cellulitis and skin infection and they certainly could be some early infectious processes going on.  Plan to cover with Keflex and recommend dermatology follow-up.  Patient agreeable and all questions answered, close return precautions  given.    DIFFERENTIAL DIAGNOSIS INCLUDE BUT NOT LIMITED TO:     Zoster, wound infection, cellulitis    Clinical Scores: N/A             1039 I rechecked the patient.  I discussed the patient's diagnosis, and plan for discharge.  A repeat exam reveals no new worrisome changes from my initial exam findings.  The patient understands that the fact that they are being discharged does not denote that nothing is abnormal, it indicates that no clinical emergency is present and that they must follow-up as directed in order to properly maintain their health.  Follow-up instructions (specifically listed below) and return to ER precautions were given at this time.  I specifically instructed the patient to follow-up with their PCP.  The patient understands and agrees with the plan, and is ready for discharge.  All questions answered.         AS OF 10:39 EDT VITALS:    BP - 138/90  HR - 75  TEMP - 97.2 °F (36.2 °C)  O2 SATS - 98%    COMPLEXITY OF CARE  Admission was considered but after careful review of the patient's presentation, physical examination, diagnostic results, and response to treatment the patient may be safely discharged with outpatient follow-up.      DIAGNOSIS  Final diagnoses:   Facial lesion   Scalp lesion         DISPOSITION  ED Disposition       ED Disposition   Discharge    Condition   Stable    Comment   --                Please note that portions of this document were completed with a voice recognition program.    Note Disclaimer: At [facility], we believe that sharing information builds trust and better relationships. You are receiving this note because you recently visited [facility]. It is possible you will see health information before a provider has talked with you about it. This kind of information can be easy to misunderstand. To help you fully understand what it means for your health, we urge you to discuss this note with your provider.         Dk Zendejas PA  10/04/24 1041

## 2024-10-08 ENCOUNTER — OFFICE VISIT (OUTPATIENT)
Dept: ORTHOPEDIC SURGERY | Facility: CLINIC | Age: 59
End: 2024-10-08
Payer: MEDICAID

## 2024-10-08 VITALS — HEIGHT: 71 IN | TEMPERATURE: 96.8 F | WEIGHT: 183 LBS | BODY MASS INDEX: 25.62 KG/M2

## 2024-10-08 DIAGNOSIS — R52 PAIN: Primary | ICD-10-CM

## 2024-10-08 PROCEDURE — 99203 OFFICE O/P NEW LOW 30 MIN: CPT | Performed by: ORTHOPAEDIC SURGERY

## 2024-10-08 PROCEDURE — 73562 X-RAY EXAM OF KNEE 3: CPT | Performed by: ORTHOPAEDIC SURGERY

## 2024-10-08 RX ORDER — CEPHALEXIN 500 MG/1
500 CAPSULE ORAL 3 TIMES DAILY
Qty: 21 CAPSULE | Refills: 0 | Status: SHIPPED | OUTPATIENT
Start: 2024-10-08

## 2024-10-08 RX ORDER — VALACYCLOVIR HYDROCHLORIDE 1 G/1
1000 TABLET, FILM COATED ORAL 3 TIMES DAILY
Qty: 21 TABLET | Refills: 0 | Status: SHIPPED | OUTPATIENT
Start: 2024-10-08

## 2024-10-08 NOTE — PROGRESS NOTES
"Patient: Oliverio Corrales  YOB: 1965 59 y.o. male  Medical Record Number: [id number]    Chief Complaints:   Chief Complaint   Patient presents with    Left Knee - Initial Evaluation, Pain    Right Knee - Initial Evaluation, Pain       History of Present Illness:Oliverio Corrales is a 59 y.o. male who presents with heber knee pain -  had initial injury 5 or so years ago -  had right knee scope , multiple injections  - states the gel injections make him sick for 4  or 5 weeks. Feels better after bike/exercise.  Has a mild diffuse ache otherwise is not severe pain.  He still tries to workout on a regular basis.    Allergies:   Allergies   Allergen Reactions    Hydrocortisone Nausea Only, Palpitations and Confusion     \"injecting cortisone\", can tolerate skin cream just fine. Patient states he can take it heart races    Cortisone Palpitations    Plasticized Base [Plastibase] Rash     SWELLING AT SITE, STATES ALLERGY TO PLASTIC SCREWS, TAPE    Suture Rash     STATES ALLERGY TO PLASTIC SUTURES     Amoxicillin GI Intolerance    Erythromycin GI Intolerance    Penicillins Other (See Comments)     PT reports Pustules on head and hair loss     Sulfa Antibiotics GI Intolerance       Medications:   Current Outpatient Medications   Medication Sig Dispense Refill    Biotin w/ Vitamins C & E (Hair/Skin/Nails) 1250-7.5-7.5 MCG-MG-UNT chewable tablet Chew 5 mg Every Morning.      Carbonyl Iron 15 MG chewable tablet Chew 65 mg As Needed. TAKES ONE OR TWO A DAY      cyanocobalamin (VITAMIN B-12) 1000 MCG tablet Take 1 tablet by mouth Daily.      ferrous gluconate (FERGON) 324 MG tablet Take 1 tablet by mouth Daily.      hydroCHLOROthiazide 12.5 MG tablet Take 1 tablet by mouth Daily. 30 tablet 3    olopatadine (PATANOL) 0.1 % ophthalmic solution Administer 1 drop to both eyes 2 (Two) Times a Day. 5 mL 0    OnabotulinumtoxinA (BOTOX IJ) Inject  as directed.      PERFLUOROHEXYLOCTANE OP Apply 1 drop to eye(s) as directed by " "provider 4 (Four) Times a Day.      sildenafil (REVATIO) 20 MG tablet Take 1 tablet by mouth As Needed (Sexual Activity).      tobramycin in sterile water (preservative free) injection-balanced salts ophthalmic solution Apply 1-2 drops to eye(s) as directed by provider Every 4 (Four) Hours. 5 mL 0    valACYclovir (VALTREX) 1000 MG tablet Take 1 tablet by mouth 3 (Three) Times a Day. 21 tablet 0    cephalexin (KEFLEX) 500 MG capsule Take 1 capsule by mouth 3 (Three) Times a Day. (Patient not taking: Reported on 10/8/2024) 21 capsule 0     No current facility-administered medications for this visit.         The following portions of the patient's history were reviewed and updated as appropriate: allergies, current medications, past family history, past medical history, past social history, past surgical history and problem list.    Review of Systems:   Pertinent positives/negatives listed in HPI above    Physical Exam:   Vitals:    10/08/24 1056   Temp: 96.8 °F (36 °C)   TempSrc: Temporal   Weight: 83 kg (183 lb)   Height: 180.3 cm (71\")   PainSc:   5   PainLoc: Knee       General: A and O x 3, ASA, NAD      Knee Exam List: Knee:  bilateral    ALIGNMENT:     Neutral  ,   Patella tracks   midline    GAIT:     Nonantalgic    SKIN:    No abnormality    RANGE OF MOTION:   0  -  135   DEG    STRENGTH:   5 / 5    LIGAMENTS:    No varus / valgus instability.   Negative  Lachman.    MENISCUS:     Negative   Mimi       DISTAL PULSES:    Paplable    DISTAL SENSATION :   Intact    LYMPHATICS:     No   lymphadenopathy    OTHER:          - No  effusion      - No crepitance with ROM      - No Swelling /  tenderness to palpation pes anserine bursa        Radiology:  Xrays 3views freddy knees (ap,lateral, sunrise) were ordered and reviewed for evaluation of knee pain demonstrating mild joint space narrowing medial / PF.There are no previous films for comparision.     Assessment/Plan: Freddy knee mild OA-  needs quad strengthening - " avoid kneeling, squatting, avoid injections based on previous reaction, no surgery indicated.       Diagnoses and all orders for this visit:    1. Pain (Primary)  -     XR Knee 3 View Bilateral         Chuck Gallegos MD  10/8/2024

## 2024-11-27 ENCOUNTER — HOSPITAL ENCOUNTER (EMERGENCY)
Facility: HOSPITAL | Age: 59
Discharge: HOME | End: 2024-11-27
Attending: EMERGENCY MEDICINE | Admitting: EMERGENCY MEDICINE
Payer: MEDICAID

## 2024-11-27 ENCOUNTER — APPOINTMENT (OUTPATIENT)
Dept: GENERAL RADIOLOGY | Facility: HOSPITAL | Age: 59
End: 2024-11-27
Payer: MEDICAID

## 2024-11-27 VITALS
HEIGHT: 71 IN | TEMPERATURE: 97.4 F | BODY MASS INDEX: 25.62 KG/M2 | OXYGEN SATURATION: 96 % | HEART RATE: 56 BPM | WEIGHT: 182.98 LBS | DIASTOLIC BLOOD PRESSURE: 93 MMHG | RESPIRATION RATE: 18 BRPM | SYSTOLIC BLOOD PRESSURE: 133 MMHG

## 2024-11-27 DIAGNOSIS — R06.09 CHRONIC DYSPNEA: ICD-10-CM

## 2024-11-27 DIAGNOSIS — K52.9 CHRONIC DIARRHEA: Primary | ICD-10-CM

## 2024-11-27 DIAGNOSIS — R63.4 UNINTENTIONAL WEIGHT LOSS: ICD-10-CM

## 2024-11-27 LAB
ALBUMIN SERPL-MCNC: 3.8 G/DL (ref 3.5–5.2)
ALBUMIN/GLOB SERPL: 1.5 G/DL
ALP SERPL-CCNC: 133 U/L (ref 39–117)
ALT SERPL W P-5'-P-CCNC: 25 U/L (ref 1–41)
ANION GAP SERPL CALCULATED.3IONS-SCNC: 8.4 MMOL/L (ref 5–15)
AST SERPL-CCNC: 21 U/L (ref 1–40)
BASOPHILS # BLD AUTO: 0.04 10*3/MM3 (ref 0–0.2)
BASOPHILS NFR BLD AUTO: 0.7 % (ref 0–1.5)
BILIRUB SERPL-MCNC: 1.3 MG/DL (ref 0–1.2)
BUN SERPL-MCNC: 16 MG/DL (ref 8–23)
BUN/CREAT SERPL: 14.5 (ref 7–25)
CALCIUM SPEC-SCNC: 8.8 MG/DL (ref 8.6–10.5)
CHLORIDE SERPL-SCNC: 105 MMOL/L (ref 98–107)
CO2 SERPL-SCNC: 23.6 MMOL/L (ref 22–29)
CREAT SERPL-MCNC: 1.1 MG/DL (ref 0.76–1.27)
DEPRECATED RDW RBC AUTO: 42.3 FL (ref 37–54)
EGFRCR SERPLBLD CKD-EPI 2021: 76.4 ML/MIN/1.73
EOSINOPHIL # BLD AUTO: 0.29 10*3/MM3 (ref 0–0.4)
EOSINOPHIL NFR BLD AUTO: 4.9 % (ref 0.3–6.2)
ERYTHROCYTE [DISTWIDTH] IN BLOOD BY AUTOMATED COUNT: 12.8 % (ref 12.3–15.4)
GLOBULIN UR ELPH-MCNC: 2.5 GM/DL
GLUCOSE SERPL-MCNC: 135 MG/DL (ref 65–99)
HCT VFR BLD AUTO: 45.2 % (ref 37.5–51)
HGB BLD-MCNC: 15.6 G/DL (ref 13–17.7)
IMM GRANULOCYTES # BLD AUTO: 0.02 10*3/MM3 (ref 0–0.05)
IMM GRANULOCYTES NFR BLD AUTO: 0.3 % (ref 0–0.5)
LYMPHOCYTES # BLD AUTO: 1.64 10*3/MM3 (ref 0.7–3.1)
LYMPHOCYTES NFR BLD AUTO: 27.6 % (ref 19.6–45.3)
MCH RBC QN AUTO: 31.7 PG (ref 26.6–33)
MCHC RBC AUTO-ENTMCNC: 34.5 G/DL (ref 31.5–35.7)
MCV RBC AUTO: 91.9 FL (ref 79–97)
MONOCYTES # BLD AUTO: 0.32 10*3/MM3 (ref 0.1–0.9)
MONOCYTES NFR BLD AUTO: 5.4 % (ref 5–12)
NEUTROPHILS NFR BLD AUTO: 3.64 10*3/MM3 (ref 1.7–7)
NEUTROPHILS NFR BLD AUTO: 61.1 % (ref 42.7–76)
NRBC BLD AUTO-RTO: 0 /100 WBC (ref 0–0.2)
NT-PROBNP SERPL-MCNC: 56.3 PG/ML (ref 0–900)
PLATELET # BLD AUTO: 195 10*3/MM3 (ref 140–450)
PMV BLD AUTO: 9.5 FL (ref 6–12)
POTASSIUM SERPL-SCNC: 3.6 MMOL/L (ref 3.5–5.2)
PROT SERPL-MCNC: 6.3 G/DL (ref 6–8.5)
QT INTERVAL: 409 MS
QTC INTERVAL: 395 MS
RBC # BLD AUTO: 4.92 10*6/MM3 (ref 4.14–5.8)
SODIUM SERPL-SCNC: 137 MMOL/L (ref 136–145)
TROPONIN T SERPL HS-MCNC: 13 NG/L
WBC NRBC COR # BLD AUTO: 5.95 10*3/MM3 (ref 3.4–10.8)

## 2024-11-27 PROCEDURE — 99284 EMERGENCY DEPT VISIT MOD MDM: CPT

## 2024-11-27 PROCEDURE — 83880 ASSAY OF NATRIURETIC PEPTIDE: CPT | Performed by: EMERGENCY MEDICINE

## 2024-11-27 PROCEDURE — 36415 COLL VENOUS BLD VENIPUNCTURE: CPT | Performed by: EMERGENCY MEDICINE

## 2024-11-27 PROCEDURE — 93005 ELECTROCARDIOGRAM TRACING: CPT | Performed by: EMERGENCY MEDICINE

## 2024-11-27 PROCEDURE — 84484 ASSAY OF TROPONIN QUANT: CPT | Performed by: EMERGENCY MEDICINE

## 2024-11-27 PROCEDURE — 71045 X-RAY EXAM CHEST 1 VIEW: CPT

## 2024-11-27 PROCEDURE — 85025 COMPLETE CBC W/AUTO DIFF WBC: CPT | Performed by: EMERGENCY MEDICINE

## 2024-11-27 PROCEDURE — 80053 COMPREHEN METABOLIC PANEL: CPT | Performed by: EMERGENCY MEDICINE

## 2024-11-27 NOTE — DISCHARGE INSTRUCTIONS
Take Imodium as needed.  Follow-up with your GI doctor.  Return to the emergency department for worsening/persistent symptoms, chest pain, fever, vomiting, or other concern

## 2024-11-27 NOTE — ED PROVIDER NOTES
" EMERGENCY DEPARTMENT ENCOUNTER    Room Number:  13/13  PCP: Nathanael Phillips MD  Historian: Patient    I initially evaluated the patient at 11:27 AM    HPI:  Chief Complaint: Diarrhea  A complete HPI/ROS/PMH/PSH/SH/FH are unobtainable due to: Nothing  Context: Oliverio Corrales is a 59 y.o. male with a medical history of atrial fibrillation, GERD, broncholith, pulmonary embolus who presents to the ED c/o acute diarrhea.  Patient complains of daily diarrhea for the past 3 months.  Stool is watery and nonbloody.  He has taken Imodium with some relief.  Also complains of intermittent abdominal cramping.  States he has lost about 10 pounds over the past 3 months.  He was on antibiotics in early October.  He was having diarrhea prior to this.  Denies fever, vomiting, or loss of appetite.  He also complains of intermittent shortness of breath for the past several months.  He had a bronchoscopy to months ago and was found to have a broncholith.  He states \"I feel like the stone is getting bigger\".  Denies cough, fever, chest pain.  Dr. Matthews is his pulmonologist.            PAST MEDICAL HISTORY  Active Ambulatory Problems     Diagnosis Date Noted    Bilateral pulmonary embolism 01/02/2017    HH (hiatus hernia) 10/30/2018    Hiatal hernia with gastroesophageal reflux disease and esophagitis 01/03/2019    Tachycardia 05/22/2017    Pleuritic chest pain 05/22/2017    Intractable chronic migraine without aura and without status migrainosus 09/08/2016    Hx pulmonary embolism 05/22/2017    Dyspnea 10/08/2018    Complex tear of medial meniscus of left knee as current injury 09/18/2020    Chronic neck pain 08/31/2015    Biceps tendinosis of right shoulder 08/27/2020    ASHD (arteriosclerotic heart disease) 10/08/2018    Atrial fibrillation 10/08/2018    Acquired trigger finger of left middle finger 01/14/2020    Cellulitis 10/04/2023    Acute on chronic colitis 05/26/2022    Lymphadenitis 11/30/2015    Attention deficit " hyperactivity disorder 05/26/2022    Cubital tunnel syndrome 03/28/2017    Hearing loss 10/16/2018    Inguinal hernia 01/18/2017    Injury of tendon of rotator cuff 09/25/2023    Macular degeneration 10/04/2023    Nontraumatic rupture of long head of biceps tendon of left shoulder 10/03/2023    Osteoarthritis of left glenohumeral joint 10/03/2023    Other specified postprocedural states 12/04/2014    Peyronie's disease 08/03/2020    Thrombosis of superficial vein of penis 02/04/2021    Sleep apnea 02/27/2017    Rotator cuff syndrome 09/02/2015    Coronary arteriosclerosis 10/08/2018    Pulmonary embolism 01/02/2017    Gastroesophageal reflux disease 10/08/2018    Personal history of pulmonary embolism 05/22/2017    Intractable chronic migraine without aura 09/08/2016    Pleuritic pain 05/22/2017    H/O cosmetic surgery- facelift 2022 10/04/2023    History of Nissen fundoplication 10/04/2023    Diarrhea 01/21/2024     Resolved Ambulatory Problems     Diagnosis Date Noted    No Resolved Ambulatory Problems     Past Medical History:   Diagnosis Date    Anesthesia     Broncholithiasis     Dry eyes, bilateral     GERD (gastroesophageal reflux disease)     H/O removal of cyst 06/2024    Reflux esophagitis          PAST SURGICAL HISTORY  Past Surgical History:   Procedure Laterality Date    BRONCHOSCOPY N/A 06/11/2024    Procedure: BRONCHOSCOPY WITH BRONCHIAL AVEOLAR LAVAGE, brushing and biopsies;  Surgeon: Alexander Matthews MD;  Location: [facility] ENDOSCOPY;  Service: Pulmonary;  Laterality: N/A;  pre: broncholith  post:endobronchial lesion/mass    BRONCHOSCOPY N/A 08/07/2024    Procedure: BRONCHOSCOPY WITH BRONCHIAL AVEOLAR LAVAGE and biopsy;  Surgeon: Alexander Matthews MD;  Location: [facility] ENDOSCOPY;  Service: Pulmonary;  Laterality: N/A;  broncholiths    BRONCHOSCOPY N/A 9/3/2024    Procedure: BRONCHOSCOPY WITH APC;  Surgeon: Gene Gomez MD;  Location: [facility] OR;  Service: Pulmonary;  Laterality: N/A;     CHOLECYSTECTOMY      COLONOSCOPY      CYST REMOVAL  06/2024    ON MIDDLE OF CHEST    ENDOSCOPY      FACELIFT N/A 01/29/2021    Procedure: FULL FACE LIFT WITH  FAT TRANSFER;  Surgeon: Roberto Connelly MD;  Location: [facility] OR;  Service: New Image;  Laterality: N/A;    INGUINAL HERNIA REPAIR      NISSEN FUNDOPLICATION N/A 01/03/2019    Procedure: REDO LAPAROSCOPIC NISSEN FUNDOPLICATION  WITH DAVINCI ROBOT AND MESH;  Surgeon: Qi Beltre MD;  Location: [facility] OR;  Service: DaVinci    SHOULDER SURGERY Left     TONSILLECTOMY      TRUNK LESION/CYST EXCISION N/A 06/03/2022    Procedure: INCISION AND DRAINAGE INFECTED CYST OF CHEST;  Surgeon: Roberto Connelly MD;  Location: [facility] OR;  Service: Plastics;  Laterality: N/A;         FAMILY HISTORY  Family History   Problem Relation Age of Onset    Lung disease Father     Malig Hyperthermia Neg Hx          SOCIAL HISTORY  Social History     Socioeconomic History    Marital status: Single   Tobacco Use    Smoking status: Never     Passive exposure: Never    Smokeless tobacco: Never   Vaping Use    Vaping status: Never Used   Substance and Sexual Activity    Alcohol use: No    Drug use: No    Sexual activity: Defer     Birth control/protection: None         ALLERGIES  Hydrocortisone, Cortisone, Plasticized base [plastibase], Suture, Amoxicillin, Erythromycin, Penicillins, and Sulfa antibiotics    REVIEW OF SYSTEMS  Review of Systems  Included in HPI  All systems reviewed and negative except for those discussed in HPI.      PHYSICAL EXAM  ED Triage Vitals [11/27/24 1123]   Temp Heart Rate Resp BP SpO2   97.4 °F (36.3 °C) 91 18 -- 97 %      Temp src Heart Rate Source Patient Position BP Location FiO2 (%)   -- -- -- -- --       Physical Exam      GENERAL: Awake, alert, oriented x 3.  Well-developed, well-nourished and nontoxic-appearing male.  Resting comfortably in no acute distress  HENT: NCAT, nares patent, moist mucous membranes  EYES: no scleral icterus  CV:  regular rhythm, normal rate  RESPIRATORY: normal effort, clear to auscultation bilaterally  ABDOMEN: soft, nondistended, nontender  MUSCULOSKELETAL: Extremities are nontender with full range of motion.  No calf tenderness.  No pedal edema.  NEURO: Speech is normal.  No facial droop.  Follows commands  PSYCH:  calm, cooperative  SKIN: warm, dry    Vital signs and nursing notes reviewed.          LAB RESULTS  Recent Results (from the past 24 hours)   Comprehensive Metabolic Panel    Collection Time: 11/27/24 12:02 PM    Specimen: Arm, Right; Blood   Result Value Ref Range    Glucose 135 (H) 65 - 99 mg/dL    BUN 16 8 - 23 mg/dL    Creatinine 1.10 0.76 - 1.27 mg/dL    Sodium 137 136 - 145 mmol/L    Potassium 3.6 3.5 - 5.2 mmol/L    Chloride 105 98 - 107 mmol/L    CO2 23.6 22.0 - 29.0 mmol/L    Calcium 8.8 8.6 - 10.5 mg/dL    Total Protein 6.3 6.0 - 8.5 g/dL    Albumin 3.8 3.5 - 5.2 g/dL    ALT (SGPT) 25 1 - 41 U/L    AST (SGOT) 21 1 - 40 U/L    Alkaline Phosphatase 133 (H) 39 - 117 U/L    Total Bilirubin 1.3 (H) 0.0 - 1.2 mg/dL    Globulin 2.5 gm/dL    A/G Ratio 1.5 g/dL    BUN/Creatinine Ratio 14.5 7.0 - 25.0    Anion Gap 8.4 5.0 - 15.0 mmol/L    eGFR 76.4 >60.0 mL/min/1.73   BNP    Collection Time: 11/27/24 12:02 PM    Specimen: Arm, Right; Blood   Result Value Ref Range    proBNP 56.3 0.0 - 900.0 pg/mL   Single High Sensitivity Troponin T    Collection Time: 11/27/24 12:02 PM    Specimen: Arm, Right; Blood   Result Value Ref Range    HS Troponin T 13 <22 ng/L   CBC Auto Differential    Collection Time: 11/27/24 12:02 PM    Specimen: Arm, Right; Blood   Result Value Ref Range    WBC 5.95 3.40 - 10.80 10*3/mm3    RBC 4.92 4.14 - 5.80 10*6/mm3    Hemoglobin 15.6 13.0 - 17.7 g/dL    Hematocrit 45.2 37.5 - 51.0 %    MCV 91.9 79.0 - 97.0 fL    MCH 31.7 26.6 - 33.0 pg    MCHC 34.5 31.5 - 35.7 g/dL    RDW 12.8 12.3 - 15.4 %    RDW-SD 42.3 37.0 - 54.0 fl    MPV 9.5 6.0 - 12.0 fL    Platelets 195 140 - 450 10*3/mm3     Neutrophil % 61.1 42.7 - 76.0 %    Lymphocyte % 27.6 19.6 - 45.3 %    Monocyte % 5.4 5.0 - 12.0 %    Eosinophil % 4.9 0.3 - 6.2 %    Basophil % 0.7 0.0 - 1.5 %    Immature Grans % 0.3 0.0 - 0.5 %    Neutrophils, Absolute 3.64 1.70 - 7.00 10*3/mm3    Lymphocytes, Absolute 1.64 0.70 - 3.10 10*3/mm3    Monocytes, Absolute 0.32 0.10 - 0.90 10*3/mm3    Eosinophils, Absolute 0.29 0.00 - 0.40 10*3/mm3    Basophils, Absolute 0.04 0.00 - 0.20 10*3/mm3    Immature Grans, Absolute 0.02 0.00 - 0.05 10*3/mm3    nRBC 0.0 0.0 - 0.2 /100 WBC   ECG 12 Lead Dyspnea    Collection Time: 11/27/24 12:13 PM   Result Value Ref Range    QT Interval 409 ms    QTC Interval 395 ms       Ordered the above labs and reviewed the results.        RADIOLOGY  XR Chest 1 View    Result Date: 11/27/2024  XR CHEST 1 VW-  HISTORY: Male who is 59 years-old, Short of breath    TECHNIQUE: Frontal view of the chest  COMPARISON: 5/6/2024  FINDINGS: Heart size is normal. Aorta appears tortuous. Pulmonary vasculature is unremarkable. No focal pulmonary consolidation, pleural effusion, or pneumothorax. No acute osseous process.      No focal pulmonary consolidation. Tortuous aorta. Follow-up as clinical indications persist.  This report was finalized on 11/27/2024 12:16 PM by Dr. Addy Mcallister M.D on Workstation: [id number]       Ordered the above noted radiological studies. Reviewed by me in PACS.            PROCEDURES  Procedures        OUTPATIENT MEDICATION MANAGEMENT:  No current Epic-ordered facility-administered medications on file.     Current Outpatient Medications Ordered in Epic   Medication Sig Dispense Refill    acyclovir (ZOVIRAX) 200 MG capsule Take 1 capsule by mouth 2 (Two) Times a Day. Start 2 days prior to procedure as directed. 10 capsule 0    Biotin w/ Vitamins C & E (Hair/Skin/Nails) 1250-7.5-7.5 MCG-MG-UNT chewable tablet Chew 5 mg Every Morning.      Carbonyl Iron 15 MG chewable tablet Chew 65 mg As Needed. TAKES ONE OR TWO A DAY       cephalexin (KEFLEX) 500 MG capsule Take 1 capsule by mouth 3 (Three) Times a Day. (Patient not taking: Reported on 10/8/2024) 21 capsule 0    clobetasol (TEMOVATE) 0.05 % external solution Apply to scalp every night at bedtime for 2 weeks, then  as needed. 50 mL 2    cyanocobalamin (VITAMIN B-12) 1000 MCG tablet Take 1 tablet by mouth Daily.      erythromycin (ROMYCIN) 5 MG/GM ophthalmic ointment apply to the lid margins at bedtime, or twice daily for flareups 3.5 g 3    ferrous gluconate (FERGON) 324 MG tablet Take 1 tablet by mouth Daily.      hydroCHLOROthiazide 12.5 MG tablet Take 1 tablet by mouth Daily. 30 tablet 3    hydrocortisone 2.5 % cream Apply sparingly to cheek twice daily for 2 weeks during flares, then use as needed. 28 g 2    olopatadine (PATANOL) 0.1 % ophthalmic solution Administer 1 drop to both eyes 2 (Two) Times a Day. 5 mL 0    OnabotulinumtoxinA (BOTOX IJ) Inject  as directed.      PERFLUOROHEXYLOCTANE OP Apply 1 drop to eye(s) as directed by provider 4 (Four) Times a Day.      sildenafil (REVATIO) 20 MG tablet Take 1 tablet by mouth As Needed (Sexual Activity).      tobramycin in sterile water (preservative free) injection-balanced salts ophthalmic solution Apply 1-2 drops to eye(s) as directed by provider Every 4 (Four) Hours. 5 mL 0    urea (CARMOL) 40 % cream Apply to feet every night at bedtime. 198.4 g 3    valACYclovir (VALTREX) 1000 MG tablet Take 1 tablet by mouth 3 (Three) Times a Day. 21 tablet 0           MEDICATIONS GIVEN IN ER  Medications - No data to display                MEDICAL DECISION MAKING, PROGRESS, and CONSULTS    All labs have been independently reviewed by me.  All radiology studies have been reviewed by me and I have also reviewed the radiology report.   EKG's independently viewed and interpreted by me.  Discussion below represents my analysis of pertinent findings related to patient's condition, differential diagnosis, treatment plan and final  disposition.      Additional sources:    - Discussed/ obtained information from independent historians: None    - External (non-ED) record review: Patient had a bronchoscopy in September 2024.  He was found to have a broncholith in the right medial mainstem bronchus.  Argon plasma coagulation was performed.    -Prescription drug monitoring program review:     N/A    - Chronic or social conditions impacting patient care (Social Determinants of Health): None          Orders placed during this visit:  Orders Placed This Encounter   Procedures    XR Chest 1 View    Comprehensive Metabolic Panel    BNP    Single High Sensitivity Troponin T    CBC Auto Differential    Monitor Blood Pressure    Continuous Pulse Oximetry    ECG 12 Lead Dyspnea    CBC & Differential         Additional orders considered but not ordered:          Differential diagnosis includes, but is not limited to:    Colitis, diverticulitis, enteritis, dehydration, electrolyte abnormality      Independent interpretation of labs, radiology studies, and discussions with consultants:  ED Course as of 11/27/24 1424   Wed Nov 27, 2024   1126 Temp: 97.4 °F (36.3 °C) [WH]   1126 Heart Rate: 91 [WH]   1126 Resp: 18 [WH]   1126 SpO2: 97 % [WH]   1126 Device (Oxygen Therapy): room air [WH]   1217 EKG personally interpreted by me at 12:16 PM.  My personal interpretation is:          EKG time: 12:13 PM  Rhythm/Rate: Sinus bradycardia, rate 56  P waves and TN: Normal  QRS, axis: Normal  ST and T waves: Normal    Interpreted Contemporaneously by me, independently viewed  EKG is not significantly changed compared to prior EKG done on 1/20/2024   [WH]   1218 Chest x-ray personally interpreted by me.  My personal interpretation is: Heart size is normal.  Lungs are clear.  No pleural effusion. [WH]   1236 HS Troponin T: 13 [WH]   1236 proBNP: 56.3 [WH]   1236 Glucose(!): 135 [WH]   1237 BUN: 16 [WH]   1237 Creatinine: 1.10 [WH]   1319 Patient is resting and breathing  comfortably.  Oxygen saturations to 100% on room air.  Heart rate is in the 50s.  Test results and diagnosis were discussed with him.  Workup is unremarkable.  I recommended he continue taking Imodium as needed.  He has seen GAYLA Maldonado, in the past and I recommended he schedule a follow-up appointment with him if his symptoms persist. []   0780 MDM: Patient presented to the ED complaining of diarrhea shortness of breath for the past few months.  Abdominal exam was benign.  He was afebrile.  He was not hypoxic.  Labs, chest x-ray, and EKG were unremarkable.  Patient was not dehydrated.  He was advised to continue taking Imodium as needed and to follow-up with his gastroenterologist and pulmonologist. [WH]      ED Course User Index  [WH] Kevyn Edwards MD         COMPLEXITY OF CARE        DIAGNOSIS  Final diagnoses:   Chronic diarrhea   Chronic dyspnea   Unintentional weight loss         DISPOSITION  DISCHARGE    Patient discharged in stable condition.    Reviewed implications of results, diagnosis, meds, responsibility to follow up, warning signs and symptoms of possible worsening, potential complications and reasons to return to ER, including worsening/persistent symptoms, abdominal pain, fever, vomiting, chest pain, or other concern.    Patient/Family voiced understanding of above instructions.    Discussed plan for discharge, as there is no emergent indication for admission. Patient referred to primary care provider for BP management due to today's BP. Pt/family is agreeable and understands need for follow up and repeat testing.  Pt is aware that discharge does not mean that nothing is wrong but it indicates no emergency is present that requires admission and they must continue care with follow-up as given below or physician of their choice.     FOLLOW-UP  Nathanael Phillips MD  [address] [facility] [address] [phone]          Alexander Matthews MD  [address]  [address]  [phone]          Marty Coles MD  [address] [facility] [address]  [facility] [address]  [phone]               Medication List      No changes were made to your prescriptions during this visit.                   Latest Documented Vital Signs:  AS OF 14:24 EST VITALS:    BP - 133/93  HR - 56  TEMP - 97.4 °F (36.3 °C)  O2 SATS - 96%            --    Please note that portions of this were completed with a voice recognition program.       Note Disclaimer: At [facility], we believe that sharing information builds trust and better relationships. You are receiving this note because you are receiving care at [facility] or recently visited. It is possible you will see health information before a provider has talked with you about it. This kind of information can be easy to misunderstand. To help you fully understand what it means for your health, we urge you to discuss this note with your provider.             Kevyn Edwards MD  11/27/24 1429

## 2024-11-27 NOTE — ED NOTES
"Pt to ED from home via pv. Pt c/o soa and diarrhea. Pt reports having \"a stone in my bronchial tube and it might be getting bigger.\" Pt reports SOA x1 month and diarrhea x3 months both worsening.   "

## 2024-12-27 ENCOUNTER — TELEPHONE (OUTPATIENT)
Dept: ORTHOPEDIC SURGERY | Facility: CLINIC | Age: 59
End: 2024-12-27

## 2024-12-27 NOTE — TELEPHONE ENCOUNTER
Caller: Oliverio Corrales    Relationship to patient: Self    Best call back number: [phone]    Chief complaint: BILATERAL KNEES    Type of visit: GEL INJECTION

## 2024-12-27 NOTE — TELEPHONE ENCOUNTER
Hub staff attempted to follow warm transfer process and was unsuccessful     Caller: Oliverio Corrales    Relationship to patient: Self    Best call back number: [phone]    Patient is needing: RETURN CALL TO SCHEDULE GEL INJECTION

## 2025-01-14 NOTE — PROGRESS NOTES
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 Grover and Dr. Duvall on the left he said the surgery is not sure how long ago.  He is also had Dr. Mead operated on the right 1.  The left when he had a biceps tendon tear and this was several years ago in 2018 2019 his big question to me today is Canna pulled that tendon back up and fix it.  He did see Dr. Baltazar who did not feel like he could do that.  He has had steroid injections in the past that he does get high blood pressure so he does not want to do those he is done an abundant amount of physical therapy      Allergies:   Allergies   Allergen Reactions    Hydrocortisone Nausea Only, Palpitations and Confusion     \"injecting cortisone\", can tolerate skin cream just fine. Patient states he can take it heart races    Cortisone Palpitations    Plasticized Base [Plastibase] Rash     SWELLING AT SITE, STATES ALLERGY TO PLASTIC SCREWS, TAPE    Suture Rash     STATES ALLERGY TO PLASTIC SUTURES     Amoxicillin GI Intolerance    Erythromycin GI Intolerance    Penicillins Other (See Comments)     PT reports Pustules on head and hair loss     Sulfa Antibiotics GI Intolerance       Medications:   Home Medications:  Current Outpatient Medications on File Prior to Visit   Medication Sig    acyclovir (ZOVIRAX) 200 MG capsule Take 1 capsule by mouth 2 (Two) Times a Day. Start 2 days prior to procedure as directed.    Biotin w/ Vitamins C & E (Hair/Skin/Nails) 1250-7.5-7.5 MCG-MG-UNT chewable tablet Chew 5 mg Every Morning.    Carbonyl Iron 15 MG chewable tablet Chew 65 mg As Needed. TAKES ONE OR TWO A DAY "    cephalexin (KEFLEX) 500 MG capsule Take 1 capsule by mouth 3 (Three) Times a Day.    clobetasol (TEMOVATE) 0.05 % external solution Apply to scalp every night at bedtime for 2 weeks, then  as needed.    cyanocobalamin (VITAMIN B-12) 1000 MCG tablet Take 1 tablet by mouth Daily.    erythromycin (ROMYCIN) 5 MG/GM ophthalmic ointment apply to the lid margins at bedtime, or twice daily for flareups    ferrous gluconate (FERGON) 324 MG tablet Take 1 tablet by mouth Daily.    hydroCHLOROthiazide 12.5 MG tablet Take 1 tablet by mouth Daily.    hydrocortisone 2.5 % cream Apply sparingly to cheek twice daily for 2 weeks during flares, then use as needed.    Miebo 1.338 GM/ML solution     olopatadine (PATANOL) 0.1 % ophthalmic solution Administer 1 drop to both eyes 2 (Two) Times a Day.    OnabotulinumtoxinA (BOTOX IJ) Inject  as directed.    PERFLUOROHEXYLOCTANE OP Apply 1 drop to eye(s) as directed by provider 4 (Four) Times a Day.    sildenafil (REVATIO) 20 MG tablet Take 1 tablet by mouth As Needed (Sexual Activity).    tobramycin in sterile water (preservative free) injection-balanced salts ophthalmic solution Apply 1-2 drops to eye(s) as directed by provider Every 4 (Four) Hours.    urea (CARMOL) 40 % cream Apply to feet every night at bedtime.    valACYclovir (VALTREX) 1000 MG tablet Take 1 tablet by mouth 3 (Three) Times a Day.     No current facility-administered medications on file prior to visit.     Current Medications:  Scheduled Meds:  Continuous Infusions:No current facility-administered medications for this visit.    PRN Meds:.    Past Medical History:   Diagnosis Date    Anesthesia     STATES WHEN HE HAS  ANESTHESIA AND  IS PUT TO SLEEP TOO FAST, GETS HEADACHE    ASHD (arteriosclerotic heart disease) 10/08/2018    Atrial fibrillation 10/08/2018    Attention deficit hyperactivity disorder 05/26/2022    Broncholithiasis     Dry eyes, bilateral     GERD (gastroesophageal reflux disease)     H/O removal of  cyst 06/2024    CHEST, REDDENED AT SITE ( STATES FROM STITCHES )    Hearing loss 10/16/2018    Osteoarthritis of left glenohumeral joint 10/03/2023    Personal history of pulmonary embolism 05/22/2017    Peyronie's disease 08/03/2020    Pulmonary embolism     Reflux esophagitis     Sleep apnea 02/27/2017    DOES NOT USE MACHINE    Thrombosis of superficial vein of penis 02/04/2021        Past Surgical History:   Procedure Laterality Date    BRONCHOSCOPY N/A 06/11/2024    Procedure: BRONCHOSCOPY WITH BRONCHIAL AVEOLAR LAVAGE, brushing and biopsies;  Surgeon: Alexander Matthews MD;  Location: [facility] ENDOSCOPY;  Service: Pulmonary;  Laterality: N/A;  pre: broncholith  post:endobronchial lesion/mass    BRONCHOSCOPY N/A 08/07/2024    Procedure: BRONCHOSCOPY WITH BRONCHIAL AVEOLAR LAVAGE and biopsy;  Surgeon: Alexander Matthews MD;  Location: [facility] ENDOSCOPY;  Service: Pulmonary;  Laterality: N/A;  broncholiths    BRONCHOSCOPY N/A 9/3/2024    Procedure: BRONCHOSCOPY WITH APC;  Surgeon: Gene Gomez MD;  Location: [facility] MAIN OR;  Service: Pulmonary;  Laterality: N/A;    CHOLECYSTECTOMY      COLONOSCOPY      CYST REMOVAL  06/2024    ON MIDDLE OF CHEST    ENDOSCOPY      FACELIFT N/A 01/29/2021    Procedure: FULL FACE LIFT WITH  FAT TRANSFER;  Surgeon: Roberto Connelly MD;  Location: [facility] OR;  Service: New Image;  Laterality: N/A;    INGUINAL HERNIA REPAIR      NISSEN FUNDOPLICATION N/A 01/03/2019    Procedure: REDO LAPAROSCOPIC NISSEN FUNDOPLICATION  WITH DAVINCI ROBOT AND MESH;  Surgeon: Qi Beltre MD;  Location: [facility] MAIN OR;  Service: DaVinci    SHOULDER SURGERY Left     TONSILLECTOMY      TRUNK LESION/CYST EXCISION N/A 06/03/2022    Procedure: INCISION AND DRAINAGE INFECTED CYST OF CHEST;  Surgeon: Roberto Connelly MD;  Location: [facility] MAIN OR;  Service: Plastics;  Laterality: N/A;        Social History     Occupational History    Occupation: restoration   Tobacco Use    Smoking status: Never      "Passive exposure: Never    Smokeless tobacco: Never   Vaping Use    Vaping status: Never Used   Substance and Sexual Activity    Alcohol use: No    Drug use: No    Sexual activity: Defer     Birth control/protection: None      Social History     Social History Narrative    Not on file        Family History   Problem Relation Age of Onset    Lung disease Father     Malglo Hyperthermia Neg Hx              Review of Systems:     Review of Systems      Physical Exam: 59 y.o. male  General Appearance:    Alert, cooperative, in no acute distress                   Vitals:    01/16/25 1037   Temp: 98 °F (36.7 °C)   Weight: 82.6 kg (182 lb)   Height: 180.3 cm (71\")   PainSc:   4   PainLoc: Shoulder      Patient is alert and read ×3 no acute distress appears her above-listed at height weight and age.  Affect is normal respiratory rate is normal unlabored. Heart rate regular rate rhythm, sclera, dentition and hearing are normal for the purpose of this exam.    Ortho Exam  Physical exam of the left and right shoulder reveals no overlying skin changes no lymphedema no lymphadenopathy.  The patient can actively flex to 180, abduction is similar external rotation is to 50, internal rotation to the upper lumbar spine.  Rotator cuff strength is 4+ to 5 over 5 with isometric strength testing without symptoms.  The patient has good cervical range of motion full and asymptomatic no radicular symptoms and a normal elbow exam.  Patient has good distal pulses   he does have a little bit of a biceps deformity on the left  Procedures          Radiology:   AP, Scapular Y and Axillary Lateral of the right and left shoulder were ordered/reviewed to evauate shoulder pain.On the right shoulder he has evidence of a distal clavicle excision with a large space between his acromioclavicular joint he has some sclerosis at the insertion the cuff no other acute pathology on the left he has some sclerosis at the insertion the cuff otherwise no acute " pathology  Imaging Results (Most Recent)       Procedure Component Value Units Date/Time    XR Shoulder 2+ View Bilateral [[id number]] Resulted: 01/16/25 1037     Updated: 01/16/25 1037    Impression:      Ordering physician's impression is located in the Encounter Note dated 01/16/25. X-ray performed in the DR room.            Assessment/Plan: Bilateral shoulder pain biggest issue is left biceps I told him I did not think you could get that tendon back up to where it needed to go also thought it might interfere with his current function which is actually quite good if he can do renovation he understands and will follow-up as needed

## 2025-01-16 ENCOUNTER — OFFICE VISIT (OUTPATIENT)
Dept: ORTHOPEDIC SURGERY | Facility: CLINIC | Age: 60
End: 2025-01-16
Payer: MEDICAID

## 2025-01-16 VITALS — WEIGHT: 182 LBS | TEMPERATURE: 98 F | BODY MASS INDEX: 25.48 KG/M2 | HEIGHT: 71 IN

## 2025-01-16 DIAGNOSIS — R52 PAIN: Primary | ICD-10-CM

## 2025-01-16 RX ORDER — PERFLUOROHEXYLOCTANE 1 MG/MG
SOLUTION OPHTHALMIC
COMMUNITY
Start: 2024-10-25

## 2025-01-21 ENCOUNTER — TELEPHONE (OUTPATIENT)
Dept: ORTHOPEDIC SURGERY | Facility: CLINIC | Age: 60
End: 2025-01-21

## 2025-01-21 NOTE — TELEPHONE ENCOUNTER
Hub staff attempted to follow warm transfer process and was unsuccessful     Caller: Oliverio Corrales    Relationship to patient: Self    Best call back number: [phone]    Patient is needing: WOULD LIKE TO RESCHEDULE AROUND FEBRUARY 17 TH.

## 2025-01-28 ENCOUNTER — TELEPHONE (OUTPATIENT)
Dept: SURGERY | Facility: CLINIC | Age: 60
End: 2025-01-28
Payer: MEDICAID

## 2025-01-28 NOTE — TELEPHONE ENCOUNTER
Called patient to let him know that he is seeing NP - although we put in that imaging needs to be done. They scheduled him anyway's. So I just let him know once testing is placed and done he would be able to see . I left him our number to call us back to confirm he is okay with this.

## 2025-01-30 ENCOUNTER — OFFICE VISIT (OUTPATIENT)
Dept: SURGERY | Facility: CLINIC | Age: 60
End: 2025-01-30
Payer: MEDICAID

## 2025-01-30 VITALS
WEIGHT: 191.4 LBS | HEART RATE: 73 BPM | BODY MASS INDEX: 26.69 KG/M2 | SYSTOLIC BLOOD PRESSURE: 140 MMHG | OXYGEN SATURATION: 100 % | DIASTOLIC BLOOD PRESSURE: 101 MMHG

## 2025-01-30 DIAGNOSIS — J98.09 BRONCHOLITHIASIS: Primary | ICD-10-CM

## 2025-01-30 PROCEDURE — 99203 OFFICE O/P NEW LOW 30 MIN: CPT | Performed by: NURSE PRACTITIONER

## 2025-01-30 NOTE — PROGRESS NOTES
"Chief Complaint  Lung Nodule (NEW PT -  NO IMAGING )    Subjective        Oliverio Corrales presents to [facility] THORACIC SURGERY  History of Present Illness    Mr. Corrales is a pleasant 59-year-old gentleman who presents today to establish care with the thoracic surgery service.  He was referred to Dr. Ceja by Dr. Qi Beltre with whom he had a redo laparoscopic Nissen fundoplication on 1/3/2019.    He follows with Dr. Alexander Matthews of pulmonary medicine and was found to have a broncholith in the right medial mainstem bronchus s/p bronchoscopy by Dr. Gomez on 9/3/2024.  He presents today for further evaluation.  He has noticed increased wheezing over the past year, denies hemoptysis.  He says he has a stabbing pain from this stone. He remains very active at baseline.    He has no personal history of cancer.  He had a PE in 2017.  He is a never smoker.  His father  from mesothelioma in .  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      Objective   Vital Signs:  BP (!) 140/101 (BP Location: Left arm, Patient Position: Sitting, Cuff Size: Adult)   Pulse 73   Wt 86.8 kg (191 lb 6.4 oz)   SpO2 100%   BMI 26.69 kg/m²   Estimated body mass index is 26.69 kg/m² as calculated from the following:    Height as of 25: 180.3 cm (71\").    Weight as of this encounter: 86.8 kg (191 lb 6.4 oz).            Physical Exam  Constitutional:       General: He is not in acute distress.     Appearance: Normal appearance. He is not ill-appearing.   HENT:      Head: Normocephalic and atraumatic.   Cardiovascular:      Rate and Rhythm: Normal rate.   Pulmonary:      Effort: Pulmonary effort is normal. No respiratory distress.   Musculoskeletal:         General: Normal range of motion.      Cervical back: Normal range of motion and neck supple.   Skin:     General: Skin is warm.   Neurological:      General: No focal deficit present.      Mental Status: He is alert.      Motor: No weakness.   Psychiatric:        "  Mood and Affect: Mood normal.         Thought Content: Thought content normal.        Result Review :           Pathology 8/7/2024:      Lung, right middle lobe, biopsy:          A.  Fragments of calcified material consistent with clinical history of broncholithiasis.         B.  Benign bronchial mucosa with ulceration, squamous metaplasia, and underlying granulation tissue (see comment).         C.  Negative for granuloma, viral cytopathic change, and neoplasm.    CT chest angiogram 5/6/2024: No evidence of PE.  No appreciable densities, pleural or pericardial effusion.  No mediastinal or hilar lymphadenopathy.  There are calcified mediastinal lymph nodes in the subcarinal region.          Assessment and Plan   Diagnoses and all orders for this visit:    1. Broncholithiasis (Primary)  -     CT Chest Without Contrast; Future    Mr. Corrales is a very pleasant 59-year-old gentleman who presents with broncholith of the right mainstem bronchus seen on bronchoscopy performed in September 2024.  He would like a surgical evaluation due to the intermittent chest discomfort he experiences, which he attributes to this stone.  I explained that this stone is likely not the cause of his discomfort but would like to get an updated CT chest for further evaluation.  He request to follow-up with Dr. Ceja to get her surgical opinion.  We will schedule him to review his CT results.       I spent 33 minutes caring for Oliverio on this date of service. This time includes time spent by me in the following activities:preparing for the visit, reviewing tests, obtaining and/or reviewing a separately obtained history, performing a medically appropriate examination and/or evaluation , counseling and educating the patient/family/caregiver, ordering medications, tests, or procedures, referring and communicating with other health care professionals , documenting information in the medical record, and independently interpreting results and  communicating that information with the patient/family/caregiver  Follow Up   No follow-ups on file.  Patient was given instructions and counseling regarding his condition or for health maintenance advice. Please see specific information pulled into the AVS if appropriate.

## 2025-02-18 ENCOUNTER — OFFICE VISIT (OUTPATIENT)
Dept: ORTHOPEDIC SURGERY | Facility: CLINIC | Age: 60
End: 2025-02-18
Payer: MEDICAID

## 2025-02-18 VITALS — TEMPERATURE: 98.4 F | BODY MASS INDEX: 27.34 KG/M2 | HEIGHT: 71 IN | WEIGHT: 195.3 LBS

## 2025-02-18 DIAGNOSIS — M17.0 PRIMARY OSTEOARTHRITIS OF KNEES, BILATERAL: Primary | ICD-10-CM

## 2025-02-18 RX ORDER — LIDOCAINE HYDROCHLORIDE 10 MG/ML
1.5 INJECTION, SOLUTION EPIDURAL; INFILTRATION; INTRACAUDAL; PERINEURAL
Status: COMPLETED | OUTPATIENT
Start: 2025-02-18 | End: 2025-02-18

## 2025-02-18 RX ADMIN — LIDOCAINE HYDROCHLORIDE 1.5 ML: 10 INJECTION, SOLUTION EPIDURAL; INFILTRATION; INTRACAUDAL; PERINEURAL at 11:37

## 2025-02-18 RX ADMIN — LIDOCAINE HYDROCHLORIDE 1.5 ML: 10 INJECTION, SOLUTION EPIDURAL; INFILTRATION; INTRACAUDAL; PERINEURAL at 11:38

## 2025-02-18 NOTE — PROGRESS NOTES
2/18/2025    Oliverio Corrales is here today for worsening knee pain. Pt has undergone injection of the knee in the past with good resolution of symptoms. Pt is requesting a repeat injection.     KNEE Injection Procedure Note:    Large Joint Arthrocentesis: R knee  Date/Time: 2/18/2025 11:37 AM  Consent given by: patient  Site marked: site marked  Timeout: Immediately prior to procedure a time out was called to verify the correct patient, procedure, equipment, support staff and site/side marked as required   Supporting Documentation  Indications: pain and joint swelling   Procedure Details  Location: knee - R knee  Preparation: Patient was prepped and draped in the usual sterile fashion  Needle size: 22 G  Approach: anterolateral  Medications administered: 1.5 mL lidocaine PF 1% 1 %; 60 mg Sodium Hyaluronate 60 MG/3ML  Patient tolerance: patient tolerated the procedure well with no immediate complications      Large Joint Arthrocentesis: L knee  Date/Time: 2/18/2025 11:38 AM  Consent given by: patient  Site marked: site marked  Timeout: Immediately prior to procedure a time out was called to verify the correct patient, procedure, equipment, support staff and site/side marked as required   Supporting Documentation  Indications: pain and joint swelling   Procedure Details  Location: knee - L knee  Preparation: Patient was prepped and draped in the usual sterile fashion  Needle size: 22 G  Approach: anterolateral  Medications administered: 1.5 mL lidocaine PF 1% 1 %; 60 mg Sodium Hyaluronate 60 MG/3ML  Patient tolerance: patient tolerated the procedure well with no immediate complications    (3 mL of lidocaine was injected in each knee for anesthetic purposes)      At the conclusion of the injection I discussed the importance of continued quad strengthening exercises on a daily basis. I will see the patient back if the symptoms should fail to improve or worsen.    Mike Warren, APRN  2/18/2025     High Dose Vitamin A Pregnancy And Lactation Text: High dose vitamin A therapy is contraindicated during pregnancy and breast feeding.

## 2025-02-19 ENCOUNTER — HOSPITAL ENCOUNTER (OUTPATIENT)
Dept: CT IMAGING | Facility: HOSPITAL | Age: 60
Discharge: HOME | End: 2025-02-19
Admitting: NURSE PRACTITIONER
Payer: MEDICAID

## 2025-02-19 DIAGNOSIS — J98.09 BRONCHOLITHIASIS: ICD-10-CM

## 2025-02-19 PROCEDURE — 71250 CT THORAX DX C-: CPT

## 2025-02-24 ENCOUNTER — TELEPHONE (OUTPATIENT)
Dept: SURGERY | Facility: CLINIC | Age: 60
End: 2025-02-24
Payer: MEDICAID

## 2025-02-24 ENCOUNTER — OFFICE VISIT (OUTPATIENT)
Dept: SURGERY | Facility: CLINIC | Age: 60
End: 2025-02-24
Payer: MEDICAID

## 2025-02-24 VITALS
BODY MASS INDEX: 26.69 KG/M2 | DIASTOLIC BLOOD PRESSURE: 66 MMHG | SYSTOLIC BLOOD PRESSURE: 130 MMHG | RESPIRATION RATE: 18 BRPM | HEART RATE: 96 BPM | OXYGEN SATURATION: 96 % | WEIGHT: 191.4 LBS

## 2025-02-24 DIAGNOSIS — R91.1 LUNG NODULE: Primary | ICD-10-CM

## 2025-02-24 PROCEDURE — 99214 OFFICE O/P EST MOD 30 MIN: CPT | Performed by: THORACIC SURGERY (CARDIOTHORACIC VASCULAR SURGERY)

## 2025-02-24 PROCEDURE — 1160F RVW MEDS BY RX/DR IN RCRD: CPT | Performed by: THORACIC SURGERY (CARDIOTHORACIC VASCULAR SURGERY)

## 2025-02-24 PROCEDURE — 1159F MED LIST DOCD IN RCRD: CPT | Performed by: THORACIC SURGERY (CARDIOTHORACIC VASCULAR SURGERY)

## 2025-02-24 NOTE — PROGRESS NOTES
"Chief Complaint  BRONCHOLITH, CT 2/19    Subjective        Oliverio Corrales presents to [facility] THORACIC SURGERY  History of Present Illness  Mr. Corrales is a pleasant 59-year-old gentleman who presents in follow-up for a broncholith.  He states over the past year he has been coughing up granules.  He has undergone bronchoscopy with Dr. Gomez which did demonstrate small abnormality at the apolinar between the middle and lower lobes on the right.  Objective   Vital Signs:  /66 (BP Location: Left arm, Patient Position: Sitting, Cuff Size: Adult)   Pulse 96   Resp 18   Wt 86.8 kg (191 lb 6.4 oz)   SpO2 96%   BMI 26.69 kg/m²   Estimated body mass index is 26.69 kg/m² as calculated from the following:    Height as of 2/18/25: 180.3 cm (71\").    Weight as of this encounter: 86.8 kg (191 lb 6.4 oz).            Physical Exam  Vitals and nursing note reviewed.   Constitutional:       Appearance: He is well-developed.   HENT:      Head: Normocephalic and atraumatic.      Nose: Nose normal.   Eyes:      Conjunctiva/sclera: Conjunctivae normal.   Pulmonary:      Effort: Pulmonary effort is normal.   Musculoskeletal:         General: Normal range of motion.      Cervical back: Normal range of motion and neck supple.   Skin:     General: Skin is warm and dry.   Neurological:      Mental Status: He is alert and oriented to person, place, and time.   Psychiatric:         Behavior: Behavior normal.         Thought Content: Thought content normal.         Judgment: Judgment normal.        Result Review :          I have independently reviewed the CT of the chest performed on 2/19/2025 and agree with radiology report which demonstrates calcified granuloma in the right middle lobe, calcified mediastinal and hilar lymph nodes.  No new nodules.     Assessment and Plan   Diagnoses and all orders for this visit:    1. Lung nodule (Primary)  -     CT Chest Without Contrast; Future      Mr. Corrales is a pleasant " 59-year-old gentleman with evidence of broncholith by bronchoscopy and biopsy.  This is benign and does not appear to be causing any other issues for him other than coughing up granular material.  Resection of this would require a bilobectomy given its location.  I would not recommend bilobectomy for a benign etiology that is not causing any of his complaints.  This was discussed at length with the patient today.  Dr. Gomez can consider bronchoscopy with intervention if he feels this is necessary.  I would plan to see Mr. Corrales in 1 year with a CT of the chest for continued surveillance.       Follow Up   No follow-ups on file.  Patient was given instructions and counseling regarding his condition or for health maintenance advice. Please see specific information pulled into the AVS if appropriate.

## 2025-02-24 NOTE — TELEPHONE ENCOUNTER
Called patient to get an appointment scheduled for him for a follow up regarding his CT chest results. Patient will come in today at 2:45. Informed patient should he need anything else then he can call our office back, patient voiced understanding.

## 2025-05-06 NOTE — PROGRESS NOTES
"  Patient: Oliverio Corrales  YOB: 1965  Date of Service: 5/6/2025    Chief Complaints: Right shoulder pain    Subjective:    History of Present Illness: Pt is seen in the office today with complaints of bilateral shoulder pain I last saw him in January both were bothering him the left was doing good the right one started bothering him about 2 and half weeks ago he was lifting a 35 pound dumbbell without warming up and felt pain in the biceps area.  Does have a history of a prior biceps tear with repair he states it is probably him a little bit in the muscle belly it is getting a little bit better        Allergies:   Allergies   Allergen Reactions    Hydrocortisone Nausea Only, Palpitations and Confusion     \"injecting cortisone\", can tolerate skin cream just fine. Patient states he can take it heart races    Cortisone Palpitations    Plasticized Base [Plastibase] Rash     SWELLING AT SITE, STATES ALLERGY TO PLASTIC SCREWS, TAPE    Suture Rash     STATES ALLERGY TO PLASTIC SUTURES     Amoxicillin GI Intolerance    Erythromycin GI Intolerance    Penicillins Other (See Comments)     PT reports Pustules on head and hair loss     Sulfa Antibiotics GI Intolerance    Wound Dressing Adhesive Rash       Medications:   Home Medications:  Current Outpatient Medications on File Prior to Visit   Medication Sig    apixaban (ELIQUIS) 5 MG tablet tablet Take 1 tablet by mouth.    apixaban (Eliquis) 5 MG tablet tablet Take 1 tablet by mouth 2 (Two) Times a Day.    Biotin w/ Vitamins C & E (Hair/Skin/Nails) 1250-7.5-7.5 MCG-MG-UNT chewable tablet Chew 5 mg Every Morning.    Carbonyl Iron 15 MG chewable tablet Chew 65 mg As Needed. TAKES ONE OR TWO A DAY    cephalexin (KEFLEX) 500 MG capsule Take 1 capsule by mouth 3 (Three) Times a Day.    clobetasol (TEMOVATE) 0.05 % external solution Apply to scalp every night at bedtime for 2 weeks, then  as needed.    cyanocobalamin (VITAMIN B-12) 1000 MCG tablet Take 1 tablet by " mouth Daily.    erythromycin (ROMYCIN) 5 MG/GM ophthalmic ointment Apply to the lid margins at bedtime, or twice daily for flareups    ferrous gluconate (FERGON) 324 MG tablet Take 1 tablet by mouth Daily.    hydroCHLOROthiazide 12.5 MG tablet Take 1 tablet by mouth Daily.    hydrocortisone 2.5 % cream Apply sparingly to cheek twice daily for 2 weeks during flares, then use as needed.    Miebo 1.338 GM/ML solution     olopatadine (PATANOL) 0.1 % ophthalmic solution Administer 1 drop to both eyes 2 (Two) Times a Day.    PERFLUOROHEXYLOCTANE OP Apply 1 drop to eye(s) as directed by provider 4 (Four) Times a Day.    sildenafil (REVATIO) 20 MG tablet Take 1 tablet by mouth As Needed (Sexual Activity).    tobramycin in sterile water (preservative free) injection-balanced salts ophthalmic solution Apply 1-2 drops to eye(s) as directed by provider Every 4 (Four) Hours.    triamcinolone (KENALOG) 0.1 % cream Apply twice daily to neck up to 2 weeks/month as needed.    urea (CARMOL) 40 % cream Apply to feet every night at bedtime.     No current facility-administered medications on file prior to visit.     Current Medications:  Scheduled Meds:  Continuous Infusions:No current facility-administered medications for this visit.    PRN Meds:.    I have reviewed the patient's medical history in detail and updated the computerized patient record.  Review and summarization of old records include:    Past Medical History:   Diagnosis Date    Anesthesia     STATES WHEN HE HAS  ANESTHESIA AND  IS PUT TO SLEEP TOO FAST, GETS HEADACHE    ASHD (arteriosclerotic heart disease) 10/08/2018    Atrial fibrillation 10/08/2018    Attention deficit hyperactivity disorder 05/26/2022    Broncholithiasis     Dry eyes, bilateral     GERD (gastroesophageal reflux disease)     H/O removal of cyst 06/2024    CHEST, REDDENED AT SITE ( STATES FROM STITCHES )    Hearing loss 10/16/2018    Osteoarthritis of left glenohumeral joint 10/03/2023    Personal  history of pulmonary embolism 05/22/2017    Peyronie's disease 08/03/2020    Pulmonary embolism     Reflux esophagitis     Sleep apnea 02/27/2017    DOES NOT USE MACHINE    Thrombosis of superficial vein of penis 02/04/2021        Past Surgical History:   Procedure Laterality Date    BRONCHOSCOPY N/A 06/11/2024    Procedure: BRONCHOSCOPY WITH BRONCHIAL AVEOLAR LAVAGE, brushing and biopsies;  Surgeon: Alexander Matthews MD;  Location: [facility] ENDOSCOPY;  Service: Pulmonary;  Laterality: N/A;  pre: broncholith  post:endobronchial lesion/mass    BRONCHOSCOPY N/A 08/07/2024    Procedure: BRONCHOSCOPY WITH BRONCHIAL AVEOLAR LAVAGE and biopsy;  Surgeon: Alexander Matthews MD;  Location: [facility] ENDOSCOPY;  Service: Pulmonary;  Laterality: N/A;  broncholiths    BRONCHOSCOPY N/A 9/3/2024    Procedure: BRONCHOSCOPY WITH APC;  Surgeon: Gene Gomez MD;  Location: [facility] MAIN OR;  Service: Pulmonary;  Laterality: N/A;    CHOLECYSTECTOMY      COLONOSCOPY      CYST REMOVAL  06/2024    ON MIDDLE OF CHEST    ENDOSCOPY      FACELIFT N/A 01/29/2021    Procedure: FULL FACE LIFT WITH  FAT TRANSFER;  Surgeon: Roberto Connelly MD;  Location: [facility] MAIN OR;  Service: New Image;  Laterality: N/A;    INGUINAL HERNIA REPAIR      NISSEN FUNDOPLICATION N/A 01/03/2019    Procedure: REDO LAPAROSCOPIC NISSEN FUNDOPLICATION  WITH DAVINCI ROBOT AND MESH;  Surgeon: Qi Beltre MD;  Location: [facility] MAIN OR;  Service: DaVinci    SHOULDER SURGERY Left     TONSILLECTOMY      TRUNK LESION/CYST EXCISION N/A 06/03/2022    Procedure: INCISION AND DRAINAGE INFECTED CYST OF CHEST;  Surgeon: Roberto Connelly MD;  Location: [facility] MAIN OR;  Service: Plastics;  Laterality: N/A;        Social History     Occupational History    Occupation: restoration   Tobacco Use    Smoking status: Never     Passive exposure: Never    Smokeless tobacco: Never   Vaping Use    Vaping status: Never Used   Substance and Sexual Activity    Alcohol use: No    Drug use: No     "Sexual activity: Defer     Birth control/protection: None      Social History     Social History Narrative    Not on file        Family History   Problem Relation Age of Onset    Lung disease Father     Malig Hyperthermia Neg Hx        ROS: 14 point review of systems was performed and was negative except for documented findings in HPI and today's encounter.     Allergies:   Allergies   Allergen Reactions    Hydrocortisone Nausea Only, Palpitations and Confusion     \"injecting cortisone\", can tolerate skin cream just fine. Patient states he can take it heart races    Cortisone Palpitations    Plasticized Base [Plastibase] Rash     SWELLING AT SITE, STATES ALLERGY TO PLASTIC SCREWS, TAPE    Suture Rash     STATES ALLERGY TO PLASTIC SUTURES     Amoxicillin GI Intolerance    Erythromycin GI Intolerance    Penicillins Other (See Comments)     PT reports Pustules on head and hair loss     Sulfa Antibiotics GI Intolerance    Wound Dressing Adhesive Rash     Constitutional:  Denies fever, shaking or chills   Eyes:  Denies change in visual acuity   HENT:  Denies nasal congestion or sore throat   Respiratory:  Denies cough or shortness of breath   Cardiovascular:  Denies chest pain or severe LE edema   GI:  Denies abdominal pain, nausea, vomiting, bloody stools or diarrhea   Musculoskeletal:  Numbness, tingling, or loss of motor function only as noted above in history of present illness.  : Denies painful urination or hematuria  Integument:  Denies rash, lesion or ulceration   Neurologic:  Denies headache or focal weakness  Endocrine:  Denies lymphadenopathy  Psych:  Denies confusion or change in mental status   Hem:  Denies active bleeding      Physical Exam: 59 y.o. male  Wt Readings from Last 3 Encounters:   02/24/25 86.8 kg (191 lb 6.4 oz)   02/18/25 88.6 kg (195 lb 4.8 oz)   01/30/25 86.8 kg (191 lb 6.4 oz)       There is no height or weight on file to calculate BMI.    There were no vitals filed for this visit.  Vital " signs reviewed.   General Appearance:    Alert, cooperative, in no acute distress                    Ortho exam    Exam of the right shoulder he has full range of motion in all directions good rotator cuff strength he has good strength with resisted elbow flexion and forearm pronation but it does give him pain in the area of the biceps with both of those             Assessment: Right shoulder pain it is really biceps I do think it is a primary muscle injury I will have him see physical therapy I want him to back off his activity certainly back off the heavy weights if he fails to improve he will get back in here as mentioned above the left shoulder is doing good    Plan:   Follow up as indicated.  Ice, elevate, and rest as needed.  Discussed conservative measures of pain control including ice, bracing.    Cynthia Daly M.D.

## 2025-05-08 ENCOUNTER — OFFICE VISIT (OUTPATIENT)
Dept: ORTHOPEDIC SURGERY | Facility: CLINIC | Age: 60
End: 2025-05-08
Payer: MEDICAID

## 2025-05-08 VITALS — TEMPERATURE: 98.2 F | BODY MASS INDEX: 26.63 KG/M2 | WEIGHT: 190.2 LBS | HEIGHT: 71 IN

## 2025-05-08 DIAGNOSIS — S46.211A STRAIN OF RIGHT BICEPS MUSCLE, INITIAL ENCOUNTER: Primary | ICD-10-CM

## 2025-05-21 ENCOUNTER — HOSPITAL ENCOUNTER (OUTPATIENT)
Dept: PHYSICAL THERAPY | Facility: HOSPITAL | Age: 60
Setting detail: THERAPIES SERIES
Discharge: HOME | End: 2025-05-21
Payer: MEDICAID

## 2025-05-21 DIAGNOSIS — S46.211D STRAIN OF RIGHT BICEPS MUSCLE, SUBSEQUENT ENCOUNTER: Primary | ICD-10-CM

## 2025-05-21 PROCEDURE — 97161 PT EVAL LOW COMPLEX 20 MIN: CPT

## 2025-05-21 NOTE — THERAPY EVALUATION
Outpatient Physical Therapy Ortho Initial Evaluation  [facility]     Patient Name: Oliverio Corrales  : 1965  MRN: [id number]  Today's Date: 2025      Visit Date: 2025    Patient Active Problem List   Diagnosis    Bilateral pulmonary embolism    HH (hiatus hernia)    Hiatal hernia with gastroesophageal reflux disease and esophagitis    Tachycardia    Pleuritic chest pain    Intractable chronic migraine without aura and without status migrainosus    Hx pulmonary embolism    Dyspnea    Complex tear of medial meniscus of left knee as current injury    Chronic neck pain    Biceps tendinosis of right shoulder    ASHD (arteriosclerotic heart disease)    Atrial fibrillation    Acquired trigger finger of left middle finger    Cellulitis    Acute on chronic colitis    Lymphadenitis    Attention deficit hyperactivity disorder    Cubital tunnel syndrome    Hearing loss    Inguinal hernia    Injury of tendon of rotator cuff    Macular degeneration    Nontraumatic rupture of long head of biceps tendon of left shoulder    Osteoarthritis of left glenohumeral joint    Other specified postprocedural states    Peyronie's disease    Thrombosis of superficial vein of penis    Sleep apnea    Rotator cuff syndrome    Coronary arteriosclerosis    Pulmonary embolism    Gastroesophageal reflux disease    Personal history of pulmonary embolism    Intractable chronic migraine without aura    Pleuritic pain    H/O cosmetic surgery- facelift     History of Nissen fundoplication    Diarrhea        Past Medical History:   Diagnosis Date    Anesthesia     STATES WHEN HE HAS  ANESTHESIA AND  IS PUT TO SLEEP TOO FAST, GETS HEADACHE    ASHD (arteriosclerotic heart disease) 10/08/2018    Atrial fibrillation 10/08/2018    Attention deficit hyperactivity disorder 2022    Broncholithiasis     Dry eyes, bilateral     GERD (gastroesophageal reflux disease)     H/O removal of cyst 2024    CHEST, REDDENED AT SITE ( STATES  FROM STITCHES )    Hearing loss 10/16/2018    Osteoarthritis of left glenohumeral joint 10/03/2023    Personal history of pulmonary embolism 05/22/2017    Peyronie's disease 08/03/2020    Pulmonary embolism     Reflux esophagitis     Sleep apnea 02/27/2017    DOES NOT USE MACHINE    Thrombosis of superficial vein of penis 02/04/2021        Past Surgical History:   Procedure Laterality Date    BRONCHOSCOPY N/A 06/11/2024    Procedure: BRONCHOSCOPY WITH BRONCHIAL AVEOLAR LAVAGE, brushing and biopsies;  Surgeon: Alexander Matthews MD;  Location: [facility] ENDOSCOPY;  Service: Pulmonary;  Laterality: N/A;  pre: broncholith  post:endobronchial lesion/mass    BRONCHOSCOPY N/A 08/07/2024    Procedure: BRONCHOSCOPY WITH BRONCHIAL AVEOLAR LAVAGE and biopsy;  Surgeon: Alexander Matthews MD;  Location: [facility] ENDOSCOPY;  Service: Pulmonary;  Laterality: N/A;  broncholiths    BRONCHOSCOPY N/A 9/3/2024    Procedure: BRONCHOSCOPY WITH APC;  Surgeon: Gene Gomez MD;  Location: [facility] MAIN OR;  Service: Pulmonary;  Laterality: N/A;    CHOLECYSTECTOMY      COLONOSCOPY      CYST REMOVAL  06/2024    ON MIDDLE OF CHEST    ENDOSCOPY      FACELIFT N/A 01/29/2021    Procedure: FULL FACE LIFT WITH  FAT TRANSFER;  Surgeon: Roberto Connelly MD;  Location: [facility] MAIN OR;  Service: New Image;  Laterality: N/A;    INGUINAL HERNIA REPAIR      NISSEN FUNDOPLICATION N/A 01/03/2019    Procedure: REDO LAPAROSCOPIC NISSEN FUNDOPLICATION  WITH DAVINCI ROBOT AND MESH;  Surgeon: Qi Beltre MD;  Location: [facility] MAIN OR;  Service: DaVinci    SHOULDER SURGERY Left     TONSILLECTOMY      TRUNK LESION/CYST EXCISION N/A 06/03/2022    Procedure: INCISION AND DRAINAGE INFECTED CYST OF CHEST;  Surgeon: Roberto Connelly MD;  Location: [facility] MAIN OR;  Service: Plastics;  Laterality: N/A;       Visit Dx:     ICD-10-CM ICD-9-CM   1. Strain of right biceps muscle, subsequent encounter  S46.211D V58.89     840.8          Patient History       Row Name 05/21/25  1500             History    Chief Complaint Pain  -JS      Type of Pain Upper Extremity / Arm  -JS      Brief Description of Current Complaint Oliverio Corrales is a 59 y.o. male who presents to physical therapy today with primary compliant of R biceps pain that began a few months ago. He reports onset of pain when he was lifting a 35 pound dumbbell for 12 reps and tapering down to 20 lbs when he felt a pop. He expresses slow improvement in pain over the last several weeks. His pain is isolated to central aspect of R biceps. Oliverio Corrales reports difficulty/increased pain with performing weighted biceps curl, reaching overhead and performing pulling related activities. Pain relieving factors include stretching R biceps. PMH includes L biceps repair (2017 from childhood injury), s/p R shoulder arthroscopy biceps tenodesis, RTC repair and subacromial decompression in 2020, and L knee arthroscopy for complex tear of medial meniscus. He expresses daily workout routine but unable to perform upper body workouts since onset of pain. Oliverio Corrales primary goal for attending skilled physical therapy is to reduce his pain and return to prior workout routine.  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 X-ray  -JS      Results of Clinical Tests see epic  -JS         Pain     Pain Location Arm  -JS      Pain at Present 5  -JS      Pain at Best 3  -JS      Pain at Worst 7  -JS      Pain Frequency Constant/continuous  -JS      Pain Description Sharp  -JS      What Performance Factors Make the Current Problem(s) WORSE? performing weighted biceps curl, reaching overhead and performing pulling related activities  -JS      What Performance Factors Make the Current  Problem(s) BETTER? biceps stretch  -JS      Is your sleep disturbed? No  -JS         Fall Risk Assessment    Any falls in the past year: No  -JS         Services    Prior Rehab/Home Health Experiences No  -JS         Daily Activities    Primary Language English  -JS      How does patient learn best? Reading;Demonstration;Pictures/Video  -JS      Barriers to learning None  -JS      Pt Participated in POC and Goals Yes  -JS         Safety    Are you being hurt, hit, or frightened by anyone at home or in your life? No  -JS      Are you being neglected by a caregiver No  -JS      Have you had any of the following issues with N/A  -JS                User Key  (r) = Recorded By, (t) = Taken By, (c) = Cosigned By      Initials Name Provider Type    Rosalba Linn, PT Physical Therapist                     PT Ortho       Row Name 05/21/25 1500       Special Tests/Palpation    Special Tests/Palpation Shoulder  -JS       Shoulder Impingement/Rotator Cuff Special Tests    Speed's Test (LH of Biceps Lesion) Negative  -JS    Yergason Test (LH of Biceps Lesion) Negative  -JS       General ROM    RT Upper Ext Rt Shoulder Flexion;Rt Shoulder External Rotation;Rt Shoulder Internal Rotation;Rt Shoulder ABduction;Rt Elbow Extension/Flexion  -JS    LT Upper Ext Lt Shoulder ABduction;Lt Shoulder Flexion;Lt Shoulder External Rotation;Lt Shoulder Internal Rotation;Lt Elbow Extension/Flexion  -JS       Right Upper Ext    Rt Shoulder Abduction AROM 0-165  -JS    Rt Shoulder Flexion AROM 0-160  -JS    Rt Shoulder External Rotation AROM CHRISTINA to T2  -JS    Rt Elbow Extension/Flexion AROM lacking 4-135  -JS       Left Upper Ext    Lt Shoulder Abduction AROM 0-155  -JS    Lt Shoulder Flexion AROM 0-160  -JS    Lt Shoulder External Rotation AROM CHRISTINA to T2  -JS    Lt Elbow Extension/Flexion AROM lacking   -JS       MMT (Manual Muscle Testing)    Rt Upper Ext Rt Shoulder Flexion;Rt Shoulder ABduction;Rt Shoulder Internal Rotation;Rt  Shoulder External Rotation;Rt Elbow Extension;Rt Elbow Flexion;Rt Forearm Supination;Rt Forearm Pronation  -JS    Lt Upper Ext Lt Shoulder Flexion;Lt Shoulder ABduction;Lt Shoulder External Rotation;Lt Shoulder Internal Rotation;Lt Elbow Extension;Lt Elbow Flexion;Lt Forearm Supination;Lt Forearm Pronation  -JS       MMT Right Upper Ext    Rt Shoulder Flexion MMT, Gross Movement (4+/5) good plus  -JS    Rt Shoulder ABduction MMT, Gross Movement (4+/5) good plus  -JS    Rt Shoulder Internal Rotation MMT, Gross Movement (5/5) normal  -JS    Rt Shoulder External Rotation MMT, Gross Movement (5/5) normal  -JS    Rt Elbow Flexion MMT, Gross Movement: (5/5) normal  -JS    Rt Elbow Extension MMT, Gross Movement: (5/5) normal  -JS    Rt Forearm Supination MMT, Gross Movement (5/5) normal  -JS    Rt Forearm Pronation MMT, Gross Movement (5/5) normal  -JS       MMT Left Upper Ext    Lt Shoulder Flexion MMT, Gross Movement (4+/5) good plus  -JS    Lt Shoulder ABduction MMT, Gross Movement (4+/5) good plus  -JS    Lt Shoulder Internal Rotation MMT, Gross Movement (5/5) normal  -JS    Lt Shoulder External Rotation MMT, Gross Movement (5/5) normal  -JS    Lt Elbow Flexion MMT, Gross Movement (5/5) normal  -JS    Lt Elbow Extension MMT, Gross Movement (5/5) normal  -JS    Lt Forearm Supination MMT, Gross Movement (5/5) normal  -JS    Lt Forearm Pronation MMT, Gross Movement (5/5) normal  -JS       Sensation    Sensation WNL? WNL  -JS       Flexibility    Flexibility Tested? Upper Extremity  -JS       Upper Extremity Flexibility    Biceps Bilateral:;Mildly limited;Moderately limited  -JS              User Key  (r) = Recorded By, (t) = Taken By, (c) = Cosigned By      Initials Name Provider Type    Rosalba Linn, PT Physical Therapist                                Therapy Education  Education Details: Educated on anatomy/pathology, evaluation findings, therapy expectations, POC and HEP  Given: HEP, Symptoms/condition  management, Pain management, Posture/body mechanics  Program: New  How Provided: Verbal, Demonstration, Written  Provided to: Patient  Level of Understanding: Verbalized, Demonstrated  02688 - PT Self Care/Mgmt Minutes: 5      PT OP Goals       Row Name 05/21/25 1500          PT Short Term Goals    STG Date to Achieve 06/20/25  -     STG 1 Pt will be independent and verbalized good understanding of initial HEP to improve ability to manage pain, as well as improve strength and ROM.  -     STG 1 Progress New  -     STG 2 Pt to be educated in/verbalize understanding of the importance of posture/ergonomics in association with their condition to facilitate self management of their condition  -     STG 2 Progress New  -     STG 3 Patient will report improvement in pain from constant to intermittent for improved activity tolerance with ADLs, functional and recreational tasks.  -     STG 3 Progress New  [facility]        Long Term Goals    LTG Date to Achieve 07/20/25  -     LTG 1 Pt will be independent and verbalized good understanding of advanced HEP to improve ability to manage pain, as well as improve strength and ROM.  -     LTG 1 Progress New  -     LTG 2 Pt will improve DASH score to at least 10% perceived disability to show overall improved quality of life.  -     LTG 2 Progress New  -     LTG 3 Patient will report returning to UE gym workout routines at 75% PLOF with </= 2/10 R biceps pain to demonstrate greater ease with lifting tasks ad return to PLOF.  -     LTG 3 Progress New  -     LTG 4 Pt will improve B shoulder flexion/abduction strength to at least 5/5.  -     LTG 4 Progress New  -     LTG 5 Patient will report reduction in R biceps pain from 7/10 at worst to 3-4/10 for improved activity tolerance with daily and recreational tasks.  -     LTG 5 Progress New  [facility]        Time Calculation    PT Goal Re-Cert Due Date 08/19/25  -               User Key  (r) = Recorded By, (t) = Taken  By, (c) = Cosigned By      Initials Name Provider Type    JS Rosalba Verma, PT Physical Therapist                     PT Assessment/Plan       Row Name 05/21/25 1500          PT Assessment    Functional Limitations Limitations in community activities;Performance in leisure activities;Limitations in functional capacity and performance;Performance in self-care ADL;Performance in sport activities  -JS     Impairments Impaired flexibility;Joint mobility;Muscle strength;Pain;Poor body mechanics;Range of motion;Posture  -JS     Assessment Comments Oliverio Corrales is a 59 y.o. male referred to physical therapy for R biceps pain. He reports onset of pain when he was lifting a 35 pound dumbbell for 12 reps and tapering down to 20 lbs when he felt a pop. He expresses slow improvement in pain over the last several weeks. His pain is isolated to central aspect of R biceps. See epic for x-ray results. He presents with a stable clinical presentation, along with  comorbidities of  s/p L biceps repair (2017 from childhood injury), s/p R shoulder arthroscopy biceps tenodesis, RTC repair and subacromial decompression in 2020, and L knee arthroscopy for complex tear of medial meniscus and personal factors of occupation requiring heavy lifting  that may impact his progress in the plan of care. Pt presents today with impaired posture, B shoulder ROM WFL, B dec elbow extension (L>R), B shoulder flexion/abduction muscle weakness, and TTP R biceps/brachialis. Special testing (-) for biceps lesion. His signs and symptoms are consistent with referring diagnosis. The previous impairments limit his ability to perform biceps curls or pulling tasks without onset of pain. Patients QuickDASH outcome measure score of 22% disability, where 0% represents no disability. Pt will benefit from skilled PT to address the previous impairments and return to PLOF.  -JS     Please refer to paper survey for additional self-reported information Yes  -JS      Rehab Potential Good  -JS     Patient/caregiver participated in establishment of treatment plan and goals Yes  -JS     Patient would benefit from skilled therapy intervention Yes  -JS        PT Plan    PT Frequency 2x/week  -JS     Predicted Duration of Therapy Intervention (PT) 8-10 visits  -JS     Planned CPT's? PT EVAL LOW COMPLEXITY: 87984;PT RE-EVAL: 82258;PT THER PROC EA 15 MIN: 46984;PT THER ACT EA 15 MIN: 64618;PT MANUAL THERAPY EA 15 MIN: 87782;PT NEUROMUSC RE-EDUCATION EA 15 MIN: 14840;PT SELF CARE/HOME MGMT/TRAIN EA 15: 51119;PT HOT OR COLD PACK TREAT MCARE;PT ELECTRICAL STIM UNATTEND:   -JS     Physical Therapy Interventions (Optional Details) dry needling  -JS     PT Plan Comments response to eval, warm up on UBE, biceps stretch, eccentric biceps strengthening, biceps curls (palm up, thumb up, palm down), STM to R biceps  -JS               User Key  (r) = Recorded By, (t) = Taken By, (c) = Cosigned By      Initials Name Provider Type    Rosalba Linn, PT Physical Therapist                       OP Exercises       Row Name 05/21/25 1500             Subjective    Subjective Comments eval  -JS         Total Minutes    42586 - PT Therapeutic Exercise Minutes 5  -JS         Exercise 1    Exercise Name 1 UBE  -JS      Additional Comments next visit  -JS         Exercise 2    Exercise Name 2 biceps stretch  -JS      Cueing 2 Verbal;Demo  -JS      Reps 2 3  -JS      Time 2 20s  -JS      Additional Comments bilateral at door frame  -JS                User Key  (r) = Recorded By, (t) = Taken By, (c) = Cosigned By      Initials Name Provider Type    Rosalba Linn, PT Physical Therapist                                  Outcome Measure Options: Quick DASH  Quick DASH  Open a tight or new jar.: No Difficulty  Do heavy household chores (e.g., wash walls, wash floors): No Difficulty  Carry a shopping bag or briefcase: No Difficulty  Wash your back: Mild Difficulty  Use a knife to cut food: No  Difficulty  Recreational activities in which you take some force or impact through your arm, should or hand (e.g. golf, hammering, tennis, etc.): Mild Difficulty  During the past week, to what extent has your arm, shoulder, or hand problem interfered with your normal social activites with family, friends, neighbors or groups?: Moderately  During the past week, were you limited in your work or other regular daily activities as a result of your arm, shoulder or hand problem?: Slightly Limited  Arm, Shoulder, or hand pain: Moderate  Tingling (pins and needles) in your arm, shoulder, or hand: Severe  During the past week, how much difficulty have you had sleeping because of the pain in your arm, shoulder or hand?: No difficulty  Number of Questions Answered: 11  Quick DASH Score: 22.73         Time Calculation:     Start Time: 1535  Stop Time: 1615  Time Calculation (min): 40 min  Timed Charges  47401 - PT Therapeutic Exercise Minutes: 5  23166 - PT Self Care/Mgmt Minutes: 5  Untimed Charges  PT Eval/Re-eval Minutes: 30  Total Minutes  Timed Charges Total Minutes: 10  Untimed Charges Total Minutes: 30   Total Minutes: 40     Therapy Charges for Today       Code Description Service Date Service Provider Modifiers Qty    [id number] HC PT EVAL LOW COMPLEXITY 3 5/21/2025 Rosalba Verma, PT GP 1            PT G-Codes  Outcome Measure Options: Quick DASH  Quick DASH Score: 22.73         Rosalba Verma, PT  5/21/2025

## 2025-06-02 ENCOUNTER — HOSPITAL ENCOUNTER (OUTPATIENT)
Dept: PHYSICAL THERAPY | Facility: HOSPITAL | Age: 60
Setting detail: THERAPIES SERIES
Discharge: HOME | End: 2025-06-02
Payer: MEDICAID

## 2025-06-02 DIAGNOSIS — S46.211D STRAIN OF RIGHT BICEPS MUSCLE, SUBSEQUENT ENCOUNTER: Primary | ICD-10-CM

## 2025-06-02 PROCEDURE — 97110 THERAPEUTIC EXERCISES: CPT

## 2025-06-02 NOTE — THERAPY TREATMENT NOTE
Outpatient Physical Therapy Ortho Treatment Note  [facility]     Patient Name: Oliverio Corrales  : 1965  MRN: [id number]  Today's Date: 2025      Visit Date: 2025    Visit Dx:    ICD-10-CM ICD-9-CM   1. Strain of right biceps muscle, subsequent encounter  S46.211D V58.89     840.8       Patient Active Problem List   Diagnosis    Bilateral pulmonary embolism    HH (hiatus hernia)    Hiatal hernia with gastroesophageal reflux disease and esophagitis    Tachycardia    Pleuritic chest pain    Intractable chronic migraine without aura and without status migrainosus    Hx pulmonary embolism    Dyspnea    Complex tear of medial meniscus of left knee as current injury    Chronic neck pain    Biceps tendinosis of right shoulder    ASHD (arteriosclerotic heart disease)    Atrial fibrillation    Acquired trigger finger of left middle finger    Cellulitis    Acute on chronic colitis    Lymphadenitis    Attention deficit hyperactivity disorder    Cubital tunnel syndrome    Hearing loss    Inguinal hernia    Injury of tendon of rotator cuff    Macular degeneration    Nontraumatic rupture of long head of biceps tendon of left shoulder    Osteoarthritis of left glenohumeral joint    Other specified postprocedural states    Peyronie's disease    Thrombosis of superficial vein of penis    Sleep apnea    Rotator cuff syndrome    Coronary arteriosclerosis    Pulmonary embolism    Gastroesophageal reflux disease    Personal history of pulmonary embolism    Intractable chronic migraine without aura    Pleuritic pain    H/O cosmetic surgery- facelift     History of Nissen fundoplication    Diarrhea        Past Medical History:   Diagnosis Date    Anesthesia     STATES WHEN HE HAS  ANESTHESIA AND  IS PUT TO SLEEP TOO FAST, GETS HEADACHE    ASHD (arteriosclerotic heart disease) 10/08/2018    Atrial fibrillation 10/08/2018    Attention deficit hyperactivity disorder 2022    Broncholithiasis     Dry eyes,  bilateral     GERD (gastroesophageal reflux disease)     H/O removal of cyst 06/2024    CHEST, REDDENED AT SITE ( STATES FROM STITCHES )    Hearing loss 10/16/2018    Osteoarthritis of left glenohumeral joint 10/03/2023    Personal history of pulmonary embolism 05/22/2017    Peyronie's disease 08/03/2020    Pulmonary embolism     Reflux esophagitis     Sleep apnea 02/27/2017    DOES NOT USE MACHINE    Thrombosis of superficial vein of penis 02/04/2021        Past Surgical History:   Procedure Laterality Date    BRONCHOSCOPY N/A 06/11/2024    Procedure: BRONCHOSCOPY WITH BRONCHIAL AVEOLAR LAVAGE, brushing and biopsies;  Surgeon: Alexander Matthews MD;  Location: [facility] ENDOSCOPY;  Service: Pulmonary;  Laterality: N/A;  pre: broncholith  post:endobronchial lesion/mass    BRONCHOSCOPY N/A 08/07/2024    Procedure: BRONCHOSCOPY WITH BRONCHIAL AVEOLAR LAVAGE and biopsy;  Surgeon: Alexander Matthews MD;  Location: [facility] ENDOSCOPY;  Service: Pulmonary;  Laterality: N/A;  broncholiths    BRONCHOSCOPY N/A 9/3/2024    Procedure: BRONCHOSCOPY WITH APC;  Surgeon: Gene Gomez MD;  Location: [facility] MAIN OR;  Service: Pulmonary;  Laterality: N/A;    CHOLECYSTECTOMY      COLONOSCOPY      CYST REMOVAL  06/2024    ON MIDDLE OF CHEST    ENDOSCOPY      FACELIFT N/A 01/29/2021    Procedure: FULL FACE LIFT WITH  FAT TRANSFER;  Surgeon: Roberto Connelly MD;  Location: [facility] MAIN OR;  Service: New Image;  Laterality: N/A;    INGUINAL HERNIA REPAIR      NISSEN FUNDOPLICATION N/A 01/03/2019    Procedure: REDO LAPAROSCOPIC NISSEN FUNDOPLICATION  WITH DAVINCI ROBOT AND MESH;  Surgeon: Qi Beltre MD;  Location: [facility] MAIN OR;  Service: DaVinci    SHOULDER SURGERY Left     TONSILLECTOMY      TRUNK LESION/CYST EXCISION N/A 06/03/2022    Procedure: INCISION AND DRAINAGE INFECTED CYST OF CHEST;  Surgeon: Roberto Connelly MD;  Location: [facility] MAIN OR;  Service: Plastics;  Laterality: N/A;                        PT Assessment/Plan        Row Name 06/02/25 0800          PT Assessment    Assessment Comments Mr. Corrales returns to PT for first f/u after initial evaluation for B biceps pain (R>L). He expresses slight inc pain for ~ 24 hours following initial evaluation that has now resolved. Started session with gentle biceps stretching and patient reports positive response. Initiated several biceps strengthening exercises with variation in hand positioning (palm up, palm down and thumb up). Focused session on higher reps for endurance training per patient request and eccentric control. Will perform STM to B biceps next session pending patient response. Oliverio Corrales remains a candidate for skilled PT.  -JS        PT Plan    PT Plan Comments response to last session, no exercises at mirror, consider STM to B biceps as needed, potentially add triceps extension, neutral to supinated biceps curls, resisted shoulder flexion, face pulls?  -JS               User Key  (r) = Recorded By, (t) = Taken By, (c) = Cosigned By      Initials Name Provider Type    Rosalba Linn, PT Physical Therapist                       OP Exercises       Row Name 06/02/25 0700             Subjective    Subjective Comments I was sore for about 24 hours after the evaluation  -JS         Subjective Pain    Able to rate subjective pain? yes  -JS      Pre-Treatment Pain Level 0  -JS      Post-Treatment Pain Level 0  -JS         Total Minutes    68799 - PT Therapeutic Exercise Minutes 39  -JS         Exercise 1    Exercise Name 1 UBE  -JS      Time 1 2/2  -JS         Exercise 2    Exercise Name 2 biceps stretch  -JS      Cueing 2 Verbal;Demo  -JS      Reps 2 4  -JS      Time 2 20s  -JS      Additional Comments bilateral at door frame, arms at 60 deg  -JS         Exercise 3    Exercise Name 3 single arm biceps stretch  -JS      Cueing 3 Verbal;Demo  -JS      Reps 3 3  -JS      Time 3 20s  -JS      Additional Comments holding on ballet bar  -JS         Exercise 4    Exercise Name 4  biceps curlsg palm up  -JS      Cueing 4 Verbal;Demo  -JS      Sets 4 2  -JS      Reps 4 15  -JS      Time 4 40#  -JS      Additional Comments SS with palm down, at CC with bar  -JS         Exercise 5    Exercise Name 5 biceps curls palm down  -JS      Cueing 5 Verbal;Demo  -JS      Sets 5 2  -JS      Reps 5 15  -JS      Time 5 40#  -JS      Additional Comments SS with palm up, at CC  -JS         Exercise 6    Exercise Name 6 shoulder row  -JS      Cueing 6 Verbal;Demo  -JS      Sets 6 2  -JS      Reps 6 15  -JS      Time 6 palm up/palm down  -JS         Exercise 7    Exercise Name 7 hammer curls  -JS      Cueing 7 Verbal;Demo  -JS      Sets 7 2  -JS      Reps 7 15e  -JS      Time 7 BTB, thumbs up  -JS                User Key  (r) = Recorded By, (t) = Taken By, (c) = Cosigned By      Initials Name Provider Type    Rosalba Linn, PT Physical Therapist                                  PT OP Goals       Row Name 06/02/25 0700          PT Short Term Goals    STG Date to Achieve 06/20/25  -     STG 1 Pt will be independent and verbalized good understanding of initial HEP to improve ability to manage pain, as well as improve strength and ROM.  -JS     STG 1 Progress Ongoing  -     STG 2 Pt to be educated in/verbalize understanding of the importance of posture/ergonomics in association with their condition to facilitate self management of their condition  -     STG 2 Progress Ongoing  -     STG 3 Patient will report improvement in pain from constant to intermittent for improved activity tolerance with ADLs, functional and recreational tasks.  -     STG 3 Progress Ongoing  -        Long Term Goals    LTG Date to Achieve 07/20/25  -     LTG 1 Pt will be independent and verbalized good understanding of advanced HEP to improve ability to manage pain, as well as improve strength and ROM.  -     LTG 1 Progress Ongoing  -     LTG 2 Pt will improve DASH score to at least 10% perceived disability to show  overall improved quality of life.  -JS     LTG 2 Progress Ongoing  -JS     LTG 3 Patient will report returning to UE gym workout routines at 75% PLOF with </= 2/10 R biceps pain to demonstrate greater ease with lifting tasks ad return to PLOF.  -JS     LTG 3 Progress Ongoing  -JS     LTG 4 Pt will improve B shoulder flexion/abduction strength to at least 5/5.  -JS     LTG 4 Progress Ongoing  -JS     LTG 5 Patient will report reduction in R biceps pain from 7/10 at worst to 3-4/10 for improved activity tolerance with daily and recreational tasks.  -     LTG 5 Progress Ongoing  -JS               User Key  (r) = Recorded By, (t) = Taken By, (c) = Cosigned By      Initials Name Provider Type    Rosalba Linn, JUNG Physical Therapist                    Therapy Education  Given: HEP, Symptoms/condition management, Pain management, Posture/body mechanics  Program: Reinforced  How Provided: Verbal, Demonstration  Provided to: Patient  Level of Understanding: Teach back education performed, Verbalized, Demonstrated              Time Calculation:   Start Time: 0747  Stop Time: 0826  Time Calculation (min): 39 min  Timed Charges  84310 - PT Therapeutic Exercise Minutes: 39  Total Minutes  Timed Charges Total Minutes: 39   Total Minutes: 39  Therapy Charges for Today       Code Description Service Date Service Provider Modifiers Qty    [id number]  PT THER PROC EA 15 MIN 6/2/2025 Rosalba Verma, PT GP 3                      Rosalba Verma PT  6/2/2025

## 2025-06-05 ENCOUNTER — HOSPITAL ENCOUNTER (OUTPATIENT)
Dept: PHYSICAL THERAPY | Facility: HOSPITAL | Age: 60
Setting detail: THERAPIES SERIES
Discharge: HOME | End: 2025-06-05
Payer: MEDICAID

## 2025-06-05 DIAGNOSIS — S46.211D STRAIN OF RIGHT BICEPS MUSCLE, SUBSEQUENT ENCOUNTER: Primary | ICD-10-CM

## 2025-06-05 PROCEDURE — 97110 THERAPEUTIC EXERCISES: CPT | Performed by: PHYSICAL THERAPIST

## 2025-06-05 PROCEDURE — 97140 MANUAL THERAPY 1/> REGIONS: CPT | Performed by: PHYSICAL THERAPIST

## 2025-06-05 NOTE — THERAPY TREATMENT NOTE
Outpatient Physical Therapy Ortho Treatment Note  [facility]     Patient Name: Oliverio Corrales  : 1965  MRN: [id number]  Today's Date: 2025      Visit Date: 2025    Visit Dx:    ICD-10-CM ICD-9-CM   1. Strain of right biceps muscle, subsequent encounter  S46.211D V58.89     840.8       Patient Active Problem List   Diagnosis    Bilateral pulmonary embolism    HH (hiatus hernia)    Hiatal hernia with gastroesophageal reflux disease and esophagitis    Tachycardia    Pleuritic chest pain    Intractable chronic migraine without aura and without status migrainosus    Hx pulmonary embolism    Dyspnea    Complex tear of medial meniscus of left knee as current injury    Chronic neck pain    Biceps tendinosis of right shoulder    ASHD (arteriosclerotic heart disease)    Atrial fibrillation    Acquired trigger finger of left middle finger    Cellulitis    Acute on chronic colitis    Lymphadenitis    Attention deficit hyperactivity disorder    Cubital tunnel syndrome    Hearing loss    Inguinal hernia    Injury of tendon of rotator cuff    Macular degeneration    Nontraumatic rupture of long head of biceps tendon of left shoulder    Osteoarthritis of left glenohumeral joint    Other specified postprocedural states    Peyronie's disease    Thrombosis of superficial vein of penis    Sleep apnea    Rotator cuff syndrome    Coronary arteriosclerosis    Pulmonary embolism    Gastroesophageal reflux disease    Personal history of pulmonary embolism    Intractable chronic migraine without aura    Pleuritic pain    H/O cosmetic surgery- facelift     History of Nissen fundoplication    Diarrhea        Past Medical History:   Diagnosis Date    Anesthesia     STATES WHEN HE HAS  ANESTHESIA AND  IS PUT TO SLEEP TOO FAST, GETS HEADACHE    ASHD (arteriosclerotic heart disease) 10/08/2018    Atrial fibrillation 10/08/2018    Attention deficit hyperactivity disorder 2022    Broncholithiasis     Dry eyes,  Patient called requesting to speak with a nurse regarding lab results. Patient scheduled for a colposcopy on 11/07 with Dr. Romo.    bilateral     GERD (gastroesophageal reflux disease)     H/O removal of cyst 06/2024    CHEST, REDDENED AT SITE ( STATES FROM STITCHES )    Hearing loss 10/16/2018    Osteoarthritis of left glenohumeral joint 10/03/2023    Personal history of pulmonary embolism 05/22/2017    Peyronie's disease 08/03/2020    Pulmonary embolism     Reflux esophagitis     Sleep apnea 02/27/2017    DOES NOT USE MACHINE    Thrombosis of superficial vein of penis 02/04/2021        Past Surgical History:   Procedure Laterality Date    BRONCHOSCOPY N/A 06/11/2024    Procedure: BRONCHOSCOPY WITH BRONCHIAL AVEOLAR LAVAGE, brushing and biopsies;  Surgeon: Alexander Matthews MD;  Location: [facility] ENDOSCOPY;  Service: Pulmonary;  Laterality: N/A;  pre: broncholith  post:endobronchial lesion/mass    BRONCHOSCOPY N/A 08/07/2024    Procedure: BRONCHOSCOPY WITH BRONCHIAL AVEOLAR LAVAGE and biopsy;  Surgeon: Alexander Matthews MD;  Location: [facility] ENDOSCOPY;  Service: Pulmonary;  Laterality: N/A;  broncholiths    BRONCHOSCOPY N/A 9/3/2024    Procedure: BRONCHOSCOPY WITH APC;  Surgeon: Gene Gomez MD;  Location: [facility] MAIN OR;  Service: Pulmonary;  Laterality: N/A;    CHOLECYSTECTOMY      COLONOSCOPY      CYST REMOVAL  06/2024    ON MIDDLE OF CHEST    ENDOSCOPY      FACELIFT N/A 01/29/2021    Procedure: FULL FACE LIFT WITH  FAT TRANSFER;  Surgeon: Roberto Connelly MD;  Location: [facility] MAIN OR;  Service: New Image;  Laterality: N/A;    INGUINAL HERNIA REPAIR      NISSEN FUNDOPLICATION N/A 01/03/2019    Procedure: REDO LAPAROSCOPIC NISSEN FUNDOPLICATION  WITH DAVINCI ROBOT AND MESH;  Surgeon: Qi Beltre MD;  Location: [facility] MAIN OR;  Service: DaVinci    SHOULDER SURGERY Left     TONSILLECTOMY      TRUNK LESION/CYST EXCISION N/A 06/03/2022    Procedure: INCISION AND DRAINAGE INFECTED CYST OF CHEST;  Surgeon: Roberto Connelly MD;  Location: [facility] MAIN OR;  Service: Plastics;  Laterality: N/A;                        PT Assessment/Plan        "Row Name 06/05/25 1055          PT Assessment    Assessment Comments Mr. Corrales returns to the clinic for right bicipital strain.  He reports having pain after last session however reports that he always has pain when he works out his muscles.  Encouraged him to be more diligent while assessing duration of pain and with descriptors of quality of pain to allow us to appropriately progress stresses to his tissues.  Initiated STM to the right mid to distal biceps tissue.  He demonstrated normal feeling tissue quality with noted absence of \"knots\".  Continue with strengthening activities for the biceps and multiple planes without immediate adverse response.  Again encourage patient to take note of his response throughout the remainder of the day.  He does report that his right biceps pain is no longer constant and now intermittent in nature, having met one of his short-term goals. Mr. Corrales continues to be a good candidate for skilled physical therapy.  -GJ        PT Plan    PT Plan Comments Assess response to previous session including duration of symptoms and quality of symptoms.  Assess response to soft tissue massage of the right biceps repeat as needed, however consider holding secondary to limited trigger points in that region.  Continue to work on strengthening of scapular girdle and biceps tissues.  Consider tricep extensions, resisted shoulder flexion, face pulls  -               User Key  (r) = Recorded By, (t) = Taken By, (c) = Cosigned By      Initials Name Provider Type    Addy Mark, PT Physical Therapist                       OP Exercises       Row Name 06/05/25 1036 06/05/25 1000          Subjective    Subjective Comments -- my L biceps is bothering me today, i have a defective biceps.  -GJ        Subjective Pain    Pre-Treatment Pain Level -- 4  -GJ     Subjective Pain Comment -- 4/10 pain on L, 2/10 on R  -GJ        Total Minutes    75274 - PT Therapeutic Exercise Minutes 30  -GJ --     81851 " - PT Manual Therapy Minutes 12  -GJ --        Exercise 1    Exercise Name 1 -- UBE  -GJ     Time 1 -- 2/2  -GJ        Exercise 2    Exercise Name 2 -- biceps stretch  -GJ     Cueing 2 -- Verbal;Demo  -GJ     Reps 2 -- 3  -GJ     Time 2 -- 20s  -GJ     Additional Comments -- B at wall  -GJ        Exercise 4    Exercise Name 4 -- biceps curlsg palm up  -GJ     Cueing 4 -- Verbal;Demo  -GJ     Sets 4 -- 2  -GJ     Reps 4 -- 15  -GJ     Time 4 -- 40#  -GJ     Additional Comments -- SS with palm down, at CC with bar  -GJ        Exercise 5    Exercise Name 5 -- biceps curls palm down  -GJ     Cueing 5 -- Verbal;Demo  -GJ     Sets 5 -- 2  -GJ     Reps 5 -- 15  -GJ     Time 5 -- 40#  -GJ     Additional Comments -- SS with palm up, at CC  -GJ        Exercise 6    Exercise Name 6 -- shoulder row  -GJ     Cueing 6 -- Verbal;Demo  -GJ     Sets 6 -- 2  -GJ     Reps 6 -- 15  -GJ     Time 6 -- palm up/palm down  -GJ     Additional Comments -- BTB, each  -GJ        Exercise 7    Exercise Name 7 -- hammer curls  -GJ     Cueing 7 -- Verbal;Demo  -GJ     Sets 7 -- 2  -GJ     Reps 7 -- 15e  -GJ     Time 7 -- BTB, thumbs up  -GJ               User Key  (r) = Recorded By, (t) = Taken By, (c) = Cosigned By      Initials Name Provider Type    Addy Mark, PT Physical Therapist                             Manual Rx (Last 36 Hours)       Manual Treatments       Row Name 06/05/25 1100 06/05/25 1036          Total Minutes    25498 - PT Manual Therapy Minutes -- 12  -GJ        Manual Rx 1    Manual Rx 1 Location STM R bicpes, pt seated, RUE rested on pillow  -GJ --     Manual Rx 1 Type focused mid to distal portions of the biceps  -GJ --               User Key  (r) = Recorded By, (t) = Taken By, (c) = Cosigned By      Initials Name Provider Type    GJ Addy Coyle, PT Physical Therapist                     PT OP Goals       Row Name 06/05/25 1000          PT Short Term Goals    STG Date to Achieve 06/20/25  -GJ     STG 1 Pt will be  independent and verbalized good understanding of initial HEP to improve ability to manage pain, as well as improve strength and ROM.  -GJ     STG 1 Progress Ongoing  -GJ     STG 2 Pt to be educated in/verbalize understanding of the importance of posture/ergonomics in association with their condition to facilitate self management of their condition  -GJ     STG 2 Progress Ongoing  -GJ     STG 3 Patient will report improvement in pain from constant to intermittent for improved activity tolerance with ADLs, functional and recreational tasks.  -GJ     STG 3 Progress Met  -GJ     STG 3 Progress Comments he reports R biceps muscle is intermittent in nature  -GJ        Long Term Goals    LTG Date to Achieve 07/20/25  -GJ     LTG 1 Pt will be independent and verbalized good understanding of advanced HEP to improve ability to manage pain, as well as improve strength and ROM.  -GJ     LTG 1 Progress Ongoing  -GJ     LTG 2 Pt will improve DASH score to at least 10% perceived disability to show overall improved quality of life.  -GJ     LTG 2 Progress Ongoing  -GJ     LTG 3 Patient will report returning to UE gym workout routines at 75% PLOF with </= 2/10 R biceps pain to demonstrate greater ease with lifting tasks ad return to PLOF.  -GJ     LTG 3 Progress Ongoing  -GJ     LTG 4 Pt will improve B shoulder flexion/abduction strength to at least 5/5.  -GJ     LTG 4 Progress Ongoing  -GJ     LTG 5 Patient will report reduction in R biceps pain from 7/10 at worst to 3-4/10 for improved activity tolerance with daily and recreational tasks.  -GJ     LTG 5 Progress Ongoing  -GJ               User Key  (r) = Recorded By, (t) = Taken By, (c) = Cosigned By      Initials Name Provider Type    Addy Mark, PT Physical Therapist                    Therapy Education  Education Details: reviewed activity modifications  Given: HEP, Symptoms/condition management, Pain management, Posture/body mechanics, Fall prevention and home safety,  Mobility training  Program: Reinforced  How Provided: Verbal, Demonstration  Provided to: Patient  Level of Understanding: Teach back education performed, Verbalized, Demonstrated              Time Calculation:   Start Time: 1045  Stop Time: 1130  Time Calculation (min): 45 min  Timed Charges  96236 - PT Therapeutic Exercise Minutes: 30  54032 - PT Manual Therapy Minutes: 12  Total Minutes  Timed Charges Total Minutes: 42   Total Minutes: 42  Therapy Charges for Today       Code Description Service Date Service Provider Modifiers Qty    [id number]  PT THER PROC EA 15 MIN 6/5/2025 Addy Coyle, PT GP 2    [id number]  PT MANUAL THERAPY EA 15 MIN 6/5/2025 Addy Coyle, PT GP 1                      Addy Coyle, PT  6/5/2025

## 2025-06-09 ENCOUNTER — HOSPITAL ENCOUNTER (OUTPATIENT)
Dept: PHYSICAL THERAPY | Facility: HOSPITAL | Age: 60
Setting detail: THERAPIES SERIES
Discharge: HOME | End: 2025-06-09
Payer: MEDICAID

## 2025-06-09 DIAGNOSIS — S46.211D STRAIN OF RIGHT BICEPS MUSCLE, SUBSEQUENT ENCOUNTER: Primary | ICD-10-CM

## 2025-06-09 PROCEDURE — 97110 THERAPEUTIC EXERCISES: CPT

## 2025-06-09 NOTE — THERAPY TREATMENT NOTE
Outpatient Physical Therapy Ortho Treatment Note  [facility]     Patient Name: Oliverio Corrales  : 1965  MRN: [id number]  Today's Date: 2025      Visit Date: 2025    Visit Dx:    ICD-10-CM ICD-9-CM   1. Strain of right biceps muscle, subsequent encounter  S46.211D V58.89     840.8       Patient Active Problem List   Diagnosis    Bilateral pulmonary embolism    HH (hiatus hernia)    Hiatal hernia with gastroesophageal reflux disease and esophagitis    Tachycardia    Pleuritic chest pain    Intractable chronic migraine without aura and without status migrainosus    Hx pulmonary embolism    Dyspnea    Complex tear of medial meniscus of left knee as current injury    Chronic neck pain    Biceps tendinosis of right shoulder    ASHD (arteriosclerotic heart disease)    Atrial fibrillation    Acquired trigger finger of left middle finger    Cellulitis    Acute on chronic colitis    Lymphadenitis    Attention deficit hyperactivity disorder    Cubital tunnel syndrome    Hearing loss    Inguinal hernia    Injury of tendon of rotator cuff    Macular degeneration    Nontraumatic rupture of long head of biceps tendon of left shoulder    Osteoarthritis of left glenohumeral joint    Other specified postprocedural states    Peyronie's disease    Thrombosis of superficial vein of penis    Sleep apnea    Rotator cuff syndrome    Coronary arteriosclerosis    Pulmonary embolism    Gastroesophageal reflux disease    Personal history of pulmonary embolism    Intractable chronic migraine without aura    Pleuritic pain    H/O cosmetic surgery- facelift     History of Nissen fundoplication    Diarrhea        Past Medical History:   Diagnosis Date    Anesthesia     STATES WHEN HE HAS  ANESTHESIA AND  IS PUT TO SLEEP TOO FAST, GETS HEADACHE    ASHD (arteriosclerotic heart disease) 10/08/2018    Atrial fibrillation 10/08/2018    Attention deficit hyperactivity disorder 2022    Broncholithiasis     Dry eyes,  bilateral     GERD (gastroesophageal reflux disease)     H/O removal of cyst 06/2024    CHEST, REDDENED AT SITE ( STATES FROM STITCHES )    Hearing loss 10/16/2018    Osteoarthritis of left glenohumeral joint 10/03/2023    Personal history of pulmonary embolism 05/22/2017    Peyronie's disease 08/03/2020    Pulmonary embolism     Reflux esophagitis     Sleep apnea 02/27/2017    DOES NOT USE MACHINE    Thrombosis of superficial vein of penis 02/04/2021        Past Surgical History:   Procedure Laterality Date    BRONCHOSCOPY N/A 06/11/2024    Procedure: BRONCHOSCOPY WITH BRONCHIAL AVEOLAR LAVAGE, brushing and biopsies;  Surgeon: Alexander Matthews MD;  Location: [facility] ENDOSCOPY;  Service: Pulmonary;  Laterality: N/A;  pre: broncholith  post:endobronchial lesion/mass    BRONCHOSCOPY N/A 08/07/2024    Procedure: BRONCHOSCOPY WITH BRONCHIAL AVEOLAR LAVAGE and biopsy;  Surgeon: Alexander Matthews MD;  Location: [facility] ENDOSCOPY;  Service: Pulmonary;  Laterality: N/A;  broncholiths    BRONCHOSCOPY N/A 9/3/2024    Procedure: BRONCHOSCOPY WITH APC;  Surgeon: Gene Gomez MD;  Location: [facility] MAIN OR;  Service: Pulmonary;  Laterality: N/A;    CHOLECYSTECTOMY      COLONOSCOPY      CYST REMOVAL  06/2024    ON MIDDLE OF CHEST    ENDOSCOPY      FACELIFT N/A 01/29/2021    Procedure: FULL FACE LIFT WITH  FAT TRANSFER;  Surgeon: Roberto Connelly MD;  Location: [facility] MAIN OR;  Service: New Image;  Laterality: N/A;    INGUINAL HERNIA REPAIR      NISSEN FUNDOPLICATION N/A 01/03/2019    Procedure: REDO LAPAROSCOPIC NISSEN FUNDOPLICATION  WITH DAVINCI ROBOT AND MESH;  Surgeon: Qi Beltre MD;  Location: [facility] MAIN OR;  Service: DaVinci    SHOULDER SURGERY Left     TONSILLECTOMY      TRUNK LESION/CYST EXCISION N/A 06/03/2022    Procedure: INCISION AND DRAINAGE INFECTED CYST OF CHEST;  Surgeon: Roberto Connelly MD;  Location: [facility] MAIN OR;  Service: Plastics;  Laterality: N/A;                        PT Assessment/Plan        Row Name 06/09/25 0700          PT Assessment    Assessment Comments Mr. Corrales returns to clinic reporting good tolerance to last visit, mild pain sensations into biceps last night with no particular JES. Resumed previous strengthening exs with initiation of triceps extension with cables, shoulder extensions with TB, and inc resistance of hammer curls with cable machine. Pt did well with all exs, cueing for slowed eccentric motions to put increased tension through muscle belly. Short discussion with return to gym regimen, which we will discuss in more length when closer to d/c time. Pt did express interest in DDN in future visits, likely will complete next week. Pt remains appropriate for skilled PT.  -DR        PT Plan    PT Plan Comments tolerance to last visit? resisted shoulder flexion, OH press with KB, face pulls? bent over rows, TRX rows?  -               User Key  (r) = Recorded By, (t) = Taken By, (c) = Cosigned By      Initials Name Provider Type    Devin De La Cruz, PT Physical Therapist                       OP Exercises       Row Name 06/09/25 0700             Subjective    Subjective Comments I felt good after last time. I had a little pain in the biceps last night.  -         Subjective Pain    Able to rate subjective pain? yes  -DR      Pre-Treatment Pain Level 3  -DR         Total Minutes    80598 - PT Therapeutic Exercise Minutes 42  -DR         Exercise 1    Exercise Name 1 UBE  -DR      Time 1 2/2  -DR         Exercise 2    Exercise Name 2 biceps stretch  -DR      Cueing 2 Verbal;Demo  -DR      Reps 2 3  -DR      Time 2 20s  -DR      Additional Comments B at wall  -DR         Exercise 3    Exercise Name 3 triceps extension  -DR      Cueing 3 Verbal;Demo  -DR      Sets 3 3  -DR      Reps 3 10  -DR      Time 3 60#  -DR      Additional Comments at CC column, with bar, focus on eccentric control for 3s  -DR         Exercise 4    Exercise Name 4 biceps curls palm up  -DR      Cueing 4  Verbal;Demo  -DR      Sets 4 2  -DR      Reps 4 15  -DR      Time 4 40#  -DR      Additional Comments SS with palm down, at CC with bar  -DR         Exercise 5    Exercise Name 5 biceps curls palm down  -DR      Cueing 5 Verbal;Demo  -DR      Sets 5 2  -DR      Reps 5 15  -DR      Time 5 40#  -DR      Additional Comments SS with palm down, at CC with bar  -DR         Exercise 6    Exercise Name 6 shoulder row  -DR      Cueing 6 Verbal;Demo  -DR      Sets 6 2  -DR      Reps 6 15  -DR      Time 6 palm up/palm down  -DR      Additional Comments BTB, each  -DR         Exercise 7    Exercise Name 7 hammer curls  -DR      Cueing 7 Verbal;Demo  -DR      Sets 7 2  -DR      Reps 7 15e  -DR      Time 7 30# CC  -DR      Additional Comments neutral   -DR         Exercise 8    Exercise Name 8 shldr ext  -DR      Cueing 8 Verbal;Demo  -DR      Sets 8 2  -DR      Reps 8 15  -DR      Time 8 BTB  -DR                User Key  (r) = Recorded By, (t) = Taken By, (c) = Cosigned By      Initials Name Provider Type    Devin De La Cruz, PT Physical Therapist                                  PT OP Goals       Row Name 06/09/25 0800          PT Short Term Goals    STG Date to Achieve 06/20/25  -     STG 1 Pt will be independent and verbalized good understanding of initial HEP to improve ability to manage pain, as well as improve strength and ROM.  -     STG 1 Progress Ongoing  -     STG 2 Pt to be educated in/verbalize understanding of the importance of posture/ergonomics in association with their condition to facilitate self management of their condition  -     STG 2 Progress Ongoing  -     STG 3 Patient will report improvement in pain from constant to intermittent for improved activity tolerance with ADLs, functional and recreational tasks.  -     STG 3 Progress Met  -DR        Long Term Goals    LTG Date to Achieve 07/20/25  -DR     LTG 1 Pt will be independent and verbalized good understanding of advanced HEP to  improve ability to manage pain, as well as improve strength and ROM.  -DR SZYMANSKIG 1 Progress Ongoing  -     LTG 2 Pt will improve DASH score to at least 10% perceived disability to show overall improved quality of life.  -DR SZYMANSKIG 2 Progress Ongoing  -     LTG 3 Patient will report returning to UE gym workout routines at 75% PLOF with </= 2/10 R biceps pain to demonstrate greater ease with lifting tasks ad return to PLOF.  -DR ODOM 3 Progress Ongoing  -     LTG 4 Pt will improve B shoulder flexion/abduction strength to at least 5/5.  -DR SZYMANSKIG 4 Progress Ongoing  -     LTG 5 Patient will report reduction in R biceps pain from 7/10 at worst to 3-4/10 for improved activity tolerance with daily and recreational tasks.  -DR SZYMANSKIG 5 Progress Ongoing  -               User Key  (r) = Recorded By, (t) = Taken By, (c) = Cosigned By      Initials Name Provider Type    Devin De La Cruz, PT Physical Therapist                    Therapy Education  Given: HEP, Symptoms/condition management, Pain management, Posture/body mechanics, Fall prevention and home safety, Mobility training  Program: Reinforced  How Provided: Verbal, Demonstration  Provided to: Patient  Level of Understanding: Teach back education performed, Verbalized, Demonstrated              Time Calculation:   Start Time: 0730  Stop Time: 0812  Time Calculation (min): 42 min  Timed Charges  18753 - PT Therapeutic Exercise Minutes: 42  Total Minutes  Timed Charges Total Minutes: 42   Total Minutes: 42  Therapy Charges for Today       Code Description Service Date Service Provider Modifiers Qty    [id number] HC PT THER PROC EA 15 MIN 6/9/2025 Devin Kennedy, PT GP 3                      Devin Kennedy PT  6/9/2025

## 2025-06-11 ENCOUNTER — HOSPITAL ENCOUNTER (OUTPATIENT)
Dept: PHYSICAL THERAPY | Facility: HOSPITAL | Age: 60
Setting detail: THERAPIES SERIES
Discharge: HOME | End: 2025-06-11
Payer: MEDICAID

## 2025-06-11 DIAGNOSIS — S46.211D STRAIN OF RIGHT BICEPS MUSCLE, SUBSEQUENT ENCOUNTER: Primary | ICD-10-CM

## 2025-06-11 PROCEDURE — 97110 THERAPEUTIC EXERCISES: CPT

## 2025-06-19 ENCOUNTER — HOSPITAL ENCOUNTER (OUTPATIENT)
Dept: PHYSICAL THERAPY | Facility: HOSPITAL | Age: 60
Setting detail: THERAPIES SERIES
Discharge: HOME | End: 2025-06-19
Payer: MEDICAID

## 2025-06-19 DIAGNOSIS — S46.211D STRAIN OF RIGHT BICEPS MUSCLE, SUBSEQUENT ENCOUNTER: Primary | ICD-10-CM

## 2025-06-19 PROCEDURE — 97110 THERAPEUTIC EXERCISES: CPT

## 2025-06-19 NOTE — THERAPY DISCHARGE NOTE
Outpatient Physical Therapy Ortho Progress Note/Discharge Summary  [facility]     Patient Name: Oliverio Corrales  : 1965  MRN: [id number]  Today's Date: 2025      Visit Date: 2025    Visit Dx:    ICD-10-CM ICD-9-CM   1. Strain of right biceps muscle, subsequent encounter  S46.211D V58.89     840.8       Patient Active Problem List   Diagnosis    Bilateral pulmonary embolism    HH (hiatus hernia)    Hiatal hernia with gastroesophageal reflux disease and esophagitis    Tachycardia    Pleuritic chest pain    Intractable chronic migraine without aura and without status migrainosus    Hx pulmonary embolism    Dyspnea    Complex tear of medial meniscus of left knee as current injury    Chronic neck pain    Biceps tendinosis of right shoulder    ASHD (arteriosclerotic heart disease)    Atrial fibrillation    Acquired trigger finger of left middle finger    Cellulitis    Acute on chronic colitis    Lymphadenitis    Attention deficit hyperactivity disorder    Cubital tunnel syndrome    Hearing loss    Inguinal hernia    Injury of tendon of rotator cuff    Macular degeneration    Nontraumatic rupture of long head of biceps tendon of left shoulder    Osteoarthritis of left glenohumeral joint    Other specified postprocedural states    Peyronie's disease    Thrombosis of superficial vein of penis    Sleep apnea    Rotator cuff syndrome    Coronary arteriosclerosis    Pulmonary embolism    Gastroesophageal reflux disease    Personal history of pulmonary embolism    Intractable chronic migraine without aura    Pleuritic pain    H/O cosmetic surgery- facelift     History of Nissen fundoplication    Diarrhea        Past Medical History:   Diagnosis Date    Anesthesia     STATES WHEN HE HAS  ANESTHESIA AND  IS PUT TO SLEEP TOO FAST, GETS HEADACHE    ASHD (arteriosclerotic heart disease) 10/08/2018    Atrial fibrillation 10/08/2018    Attention deficit hyperactivity disorder 2022    Broncholithiasis      Dry eyes, bilateral     GERD (gastroesophageal reflux disease)     H/O removal of cyst 06/2024    CHEST, REDDENED AT SITE ( STATES FROM STITCHES )    Hearing loss 10/16/2018    Osteoarthritis of left glenohumeral joint 10/03/2023    Personal history of pulmonary embolism 05/22/2017    Peyronie's disease 08/03/2020    Pulmonary embolism     Reflux esophagitis     Sleep apnea 02/27/2017    DOES NOT USE MACHINE    Thrombosis of superficial vein of penis 02/04/2021        Past Surgical History:   Procedure Laterality Date    BRONCHOSCOPY N/A 06/11/2024    Procedure: BRONCHOSCOPY WITH BRONCHIAL AVEOLAR LAVAGE, brushing and biopsies;  Surgeon: Alexander Matthews MD;  Location: [facility] ENDOSCOPY;  Service: Pulmonary;  Laterality: N/A;  pre: broncholith  post:endobronchial lesion/mass    BRONCHOSCOPY N/A 08/07/2024    Procedure: BRONCHOSCOPY WITH BRONCHIAL AVEOLAR LAVAGE and biopsy;  Surgeon: Alexander Matthews MD;  Location: [facility] ENDOSCOPY;  Service: Pulmonary;  Laterality: N/A;  broncholiths    BRONCHOSCOPY N/A 9/3/2024    Procedure: BRONCHOSCOPY WITH APC;  Surgeon: Gene Gomze MD;  Location: [facility] MAIN OR;  Service: Pulmonary;  Laterality: N/A;    CHOLECYSTECTOMY      COLONOSCOPY      CYST REMOVAL  06/2024    ON MIDDLE OF CHEST    ENDOSCOPY      FACELIFT N/A 01/29/2021    Procedure: FULL FACE LIFT WITH  FAT TRANSFER;  Surgeon: Roberto Connelly MD;  Location: [facility] MAIN OR;  Service: New Image;  Laterality: N/A;    INGUINAL HERNIA REPAIR      NISSEN FUNDOPLICATION N/A 01/03/2019    Procedure: REDO LAPAROSCOPIC NISSEN FUNDOPLICATION  WITH DAVINCI ROBOT AND MESH;  Surgeon: Qi Beltre MD;  Location: [facility] MAIN OR;  Service: DaVinci    SHOULDER SURGERY Left     TONSILLECTOMY      TRUNK LESION/CYST EXCISION N/A 06/03/2022    Procedure: INCISION AND DRAINAGE INFECTED CYST OF CHEST;  Surgeon: Roberto Connelly MD;  Location: [facility] MAIN OR;  Service: Plastics;  Laterality: N/A;        PT Ortho       Row Name  06/19/25 0700       MMT Right Upper Ext    Rt Shoulder Flexion MMT, Gross Movement (5/5) normal  -JS    Rt Shoulder ABduction MMT, Gross Movement (5/5) normal  -JS       MMT Left Upper Ext    Lt Shoulder Flexion MMT, Gross Movement (5/5) normal  -JS    Lt Shoulder ABduction MMT, Gross Movement (5/5) normal  -JS              User Key  (r) = Recorded By, (t) = Taken By, (c) = Cosigned By      Initials Name Provider Type    Rosalba Linn, PT Physical Therapist                                 PT Assessment/Plan       Row Name 06/19/25 0700          PT Assessment    Assessment Comments Oliverio Corrales has been seen for 6 physical therapy sessions for B biceps pain. Patient reports overall 100% improvement since the start of PT. He has since resumed returning to gym workout routine and no longer experiences R biceps pain or any limitation within his daily/work activities. Treatment has included therapeutic exercise, manual therapy, and patient education with home exercise program . Progress to physical therapy goals is excellent. Pt has met 3/3 STG and 5/5 LTG. Pt demonstrates improvement in UE strength, reduced pain with biceps activation and QuickDASH score also improved from 22% disability to 4% disability, where 0% represents no disability. Time spent discussing advanced HEP and appropriate progressions/modifications to make based on response following discharge and optimal frequency/duration to complete HEP over next several weeks-months. Patient verbalized understanding. He was discharged to an independent HEP and provided patient education to self-manage condition.  -JS        PT Plan    PT Plan Comments discharged  -JS               User Key  (r) = Recorded By, (t) = Taken By, (c) = Cosigned By      Initials Name Provider Type    Rosalba Linn, PT Physical Therapist                         OP Exercises       Row Name 06/19/25 0700             Subjective    Subjective Comments I am feeling back to  normal  -JS         Subjective Pain    Able to rate subjective pain? yes  -JS      Pre-Treatment Pain Level 0  -JS      Post-Treatment Pain Level 0  -JS         Total Minutes    39089 - PT Therapeutic Exercise Minutes 40  -JS         Exercise 1    Exercise Name 1 UBE  -JS      Time 1 2/2  -JS         Exercise 3    Exercise Name 3 triceps extension  -JS      Cueing 3 Verbal;Demo  -JS      Sets 3 2  -JS      Reps 3 15  -JS      Time 3 70#  -JS      Additional Comments at CC column, with bar, focus on eccentric control for 3s  -JS         Exercise 4    Exercise Name 4 biceps curls palm up  -JS      Cueing 4 Verbal;Demo  -JS      Sets 4 2  -JS      Reps 4 15  -JS      Time 4 50#  -JS      Additional Comments SS with palm down, at CC with bar  -JS         Exercise 5    Exercise Name 5 biceps curls palm down  -JS      Cueing 5 Verbal;Demo  -JS      Sets 5 2  -JS      Reps 5 15  -JS      Time 5 50#  -JS      Additional Comments SS with palm down, at CC with bar  -JS         Exercise 9    Exercise Name 9 TRX rows  -JS      Cueing 9 Verbal;Demo  -JS      Sets 9 3  -JS      Reps 9 10  -JS         Exercise 10    Exercise Name 10 TRX bicep curls  -JS      Cueing 10 Verbal;Demo  -JS      Sets 10 2  -JS      Reps 10 10  -JS         Exercise 11    Exercise Name 11 Time spent filling out survey, taking MMT, discussing goals and advanced HEP  -JS      Time 11 10 mins  -JS                User Key  (r) = Recorded By, (t) = Taken By, (c) = Cosigned By      Initials Name Provider Type    Rosalba Linn, PT Physical Therapist                                    PT OP Goals       Row Name 06/19/25 0700          PT Short Term Goals    STG Date to Achieve 06/20/25  -JS     STG 1 Pt will be independent and verbalized good understanding of initial HEP to improve ability to manage pain, as well as improve strength and ROM.  -JS     STG 1 Progress Met  -JS     STG 2 Pt to be educated in/verbalize understanding of the importance of  posture/ergonomics in association with their condition to facilitate self management of their condition  -     STG 2 Progress Met  -JS     STG 3 Patient will report improvement in pain from constant to intermittent for improved activity tolerance with ADLs, functional and recreational tasks.  -     STG 3 Progress Met  -JS        Long Term Goals    LTG Date to Achieve 07/20/25  -JS     LTG 1 Pt will be independent and verbalized good understanding of advanced HEP to improve ability to manage pain, as well as improve strength and ROM.  -JS     LTG 1 Progress Met  -JS     LTG 2 Pt will improve DASH score to at least 10% perceived disability to show overall improved quality of life.  -JS     LTG 2 Progress Met  -JS     LTG 2 Progress Comments 4%  -JS     LTG 3 Patient will report returning to UE gym workout routines at 75% PLOF with </= 2/10 R biceps pain to demonstrate greater ease with lifting tasks ad return to PLOF.  -JS     LTG 3 Progress Met  -JS     LTG 3 Progress Comments returned to PLOF  -     LTG 4 Pt will improve B shoulder flexion/abduction strength to at least 5/5.  -JS     LTG 4 Progress Met  -JS     LTG 5 Patient will report reduction in R biceps pain from 7/10 at worst to 3-4/10 for improved activity tolerance with daily and recreational tasks.  -     LTG 5 Progress Met  -     LTG 5 Progress Comments 0/10  -               User Key  (r) = Recorded By, (t) = Taken By, (c) = Cosigned By      Initials Name Provider Type    Rosalba Linn, PT Physical Therapist                    Therapy Education  Education Details: finalized HEP Access Code: [email or web address]    , discussed various progression/modifications based on response following discharge  Given: HEP, Symptoms/condition management, Pain management, Posture/body mechanics  Program: Reinforced, Progressed  How Provided: Verbal, Demonstration, Written  Provided to: Patient  Level of Understanding: Verbalized, Demonstrated    Outcome Measure  Options: Quick DASH  Quick DASH  Open a tight or new jar.: No Difficulty  Do heavy household chores (e.g., wash walls, wash floors): No Difficulty  Carry a shopping bag or briefcase: No Difficulty  Wash your back: Mild Difficulty  Use a knife to cut food: No Difficulty  Recreational activities in which you take some force or impact through your arm, should or hand (e.g. golf, hammering, tennis, etc.): No Difficulty  During the past week, to what extent has your arm, shoulder, or hand problem interfered with your normal social activites with family, friends, neighbors or groups?: Not at all  During the past week, were you limited in your work or other regular daily activities as a result of your arm, shoulder or hand problem?: Not limited at all  Arm, Shoulder, or hand pain: Mild  Tingling (pins and needles) in your arm, shoulder, or hand: None  During the past week, how much difficulty have you had sleeping because of the pain in your arm, shoulder or hand?: No difficulty  Number of Questions Answered: 11  Quick DASH Score: 4.55         Time Calculation:   Start Time: 0700  Stop Time: 0740  Time Calculation (min): 40 min  Timed Charges  09141 - PT Therapeutic Exercise Minutes: 40  Total Minutes  Timed Charges Total Minutes: 40   Total Minutes: 40  Therapy Charges for Today       Code Description Service Date Service Provider Modifiers Qty    20946893185  PT THER PROC EA 15 MIN 6/19/2025 Rosalba Verma, PT GP 3            PT G-Codes  Outcome Measure Options: Quick DASH  Quick DASH Score: 4.55     OP PT Discharge Summary  Reason for Discharge: All goals achieved  Outcomes Achieved: Able to achieve all goals within established timeline  Discharge Destination: Home with home program      Rosalba Verma, PT  6/19/2025

## (undated) DEVICE — SYR CONTRL LUERLOK 10CC

## (undated) DEVICE — TRY SKINPREP DRYPREP

## (undated) DEVICE — CONTAINER,SPECIMEN,OR STERILE,4OZ: Brand: MEDLINE

## (undated) DEVICE — ELECTRD BLD EZ CLN MOD 2.5IN

## (undated) DEVICE — 3M™ STERI-STRIP™ REINFORCED ADHESIVE SKIN CLOSURES, R1547, 1/2 IN X 4 IN (12 MM X 100 MM), 6 STRIPS/ENVELOPE: Brand: 3M™ STERI-STRIP™

## (undated) DEVICE — DRSNG TELFA PAD NONADH STR 1S 3X8IN

## (undated) DEVICE — TIP COVER ACCESSORY

## (undated) DEVICE — ANTIBACTERIAL UNDYED BRAIDED (POLYGLACTIN 910), SYNTHETIC ABSORBABLE SUTURE: Brand: COATED VICRYL

## (undated) DEVICE — VITAL SIGNS™ JACKSON-REES CIRCUITS: Brand: VITAL SIGNS™

## (undated) DEVICE — SINGLE USE SUCTION VALVE MAJ-209: Brand: SINGLE USE SUCTION VALVE (STERILE)

## (undated) DEVICE — SPNG GZ WOVN 4X4IN 12PLY 10/BX STRL

## (undated) DEVICE — GAUZE,PACKING STRIP,IODOFORM,1/2"X5YD,ST: Brand: CURAD

## (undated) DEVICE — ELECTRD NDL EZ CLN MOD 2.75IN

## (undated) DEVICE — INTENDED FOR TISSUE SEPARATION, AND OTHER PROCEDURES THAT REQUIRE A SHARP SURGICAL BLADE TO PUNCTURE OR CUT.: Brand: BARD-PARKER ® CARBON RIB-BACK BLADES

## (undated) DEVICE — TUBING, SUCTION, 1/4" X 10', STRAIGHT: Brand: MEDLINE

## (undated) DEVICE — SYR 10ML

## (undated) DEVICE — OBT BLADLES ENDOWRIST DAVINCI/S 8MM

## (undated) DEVICE — CANN VAC GYN VACURETTE BERKELEY CRV 10MM 1P/U STRL

## (undated) DEVICE — 1842 FOAM BLOCK NEEDLE COUNTER: Brand: DEVON

## (undated) DEVICE — BANDAGE,GAUZE,BULKEE II,4.5"X4.1YD,STRL: Brand: MEDLINE

## (undated) DEVICE — SOL NACL 0.9PCT 1000ML

## (undated) DEVICE — FRCP BX RADJAW4 PULM WO NDL STD1.8X2 100

## (undated) DEVICE — MSK AIRWY LARYNG LMA PILOT SZ4

## (undated) DEVICE — GLV SURG SENSICARE PI MIC PF SZ7.5 LF STRL

## (undated) DEVICE — GLV SURG BIOGEL LTX PF 7 1/2

## (undated) DEVICE — UNDYED BRAIDED (POLYGLACTIN 910), SYNTHETIC ABSORBABLE SUTURE: Brand: COATED VICRYL

## (undated) DEVICE — LOU MINOR PROCEDURE: Brand: MEDLINE INDUSTRIES, INC.

## (undated) DEVICE — GOWN ,SIRUS,NONREINFORCED SMALL: Brand: MEDLINE

## (undated) DEVICE — DRN PENRS 1/4X18IN LTX

## (undated) DEVICE — DISPOSABLE GRASPER CARTRIDGE: Brand: DIRECT DRIVE REPOSABLE GRASPERS

## (undated) DEVICE — CROUCH CORNEAL PROTECTOR: Brand: BAUSCH + LOMB

## (undated) DEVICE — EXTRCT STPL SKIN STRL BX/12

## (undated) DEVICE — ELECTRD NDL MEGADYNE EZCLEAN MEGAFINE 2IN

## (undated) DEVICE — SINGLE USE BIOPSY VALVE MAJ-210: Brand: SINGLE USE BIOPSY VALVE (STERILE)

## (undated) DEVICE — DRSNG WND BORDR/ADHS NONADHR/GZ LF 2X2IN STRL

## (undated) DEVICE — ADAPT SWVL FIBROPTIC BRONCH

## (undated) DEVICE — PURE ULTRA WHITE PETROLEUM JELLY,NET WT. 5 G: Brand: VASELINE

## (undated) DEVICE — VESSEL SEALER: Brand: ENDOWRIST;DAVINCI SI

## (undated) DEVICE — SOL ANTISTICK CAUTRY ELECTROLUBE LF

## (undated) DEVICE — NDL HYPO PRECISIONGLIDE REG 25G 1 1/2

## (undated) DEVICE — Device

## (undated) DEVICE — TOTAL TRAY, 16FR 10ML SIL FOLEY, URN: Brand: MEDLINE

## (undated) DEVICE — SENSR O2 OXIMAX FNGR A/ 18IN NONSTR

## (undated) DEVICE — TP SXN VACURETTE CRV 7MM

## (undated) DEVICE — SUT ETHLN 5/0 P3 18IN 698G

## (undated) DEVICE — ENDOPATH PNEUMONEEDLE INSUFFLATION NEEDLES WITH LUER LOCK CONNECTORS 120MM: Brand: ENDOPATH

## (undated) DEVICE — LOU LAP CHOLE: Brand: MEDLINE INDUSTRIES, INC.

## (undated) DEVICE — DRP ARM DAVINCI

## (undated) DEVICE — CONMED DISPOSABLE BRONCHIAL CYTOLOGY BRUSH, STRAIGHT HANDLE, 3 MM X 120 CM: Brand: CONMED

## (undated) DEVICE — DRSNG TELFA PAD NONADH STR 1S 3X4IN

## (undated) DEVICE — ADHS SKIN DERMABOND TOP ADVANCED

## (undated) DEVICE — SUT VIC 4/0 P3 18IN J494G

## (undated) DEVICE — BNDG ELAS ELITE V/CLOSE 4IN 5YD LF STRL

## (undated) DEVICE — CANNULA SEAL

## (undated) DEVICE — NEEDLE, QUINCKE 22GX3.5": Brand: MEDLINE INDUSTRIES, INC.

## (undated) DEVICE — DRAPE,REIN 53X77,STERILE: Brand: MEDLINE

## (undated) DEVICE — PK ENT MAJ 40

## (undated) DEVICE — 1 ML TUBERCULIN SYRINGE REGULAR TIP: Brand: MONOJECT

## (undated) DEVICE — ENDOPATH XCEL BLADELESS TROCARS WITH STABILITY SLEEVES: Brand: ENDOPATH XCEL

## (undated) DEVICE — GAUZE,SPONGE,FLUFF,6"X6.75",STRL,10/TRAY: Brand: MEDLINE

## (undated) DEVICE — TOWEL,OR,DSP,ST,BLUE,STD,4/PK,20PK/CS: Brand: MEDLINE

## (undated) DEVICE — ADAPT CLN BIOGUARD AIR/H2O DISP

## (undated) DEVICE — SPNG LAP 18X18IN LF STRL PK/5

## (undated) DEVICE — KIT,ANTI FOG,W/SPONGE & FLUID,SOFT PACK: Brand: MEDLINE

## (undated) DEVICE — IRRIGATOR BULB ASEPTO 60CC STRL

## (undated) DEVICE — MARKR SKIN W/RULR AND LBL

## (undated) DEVICE — SUT SILK 2/0 FS BLK 18IN 685G

## (undated) DEVICE — MARKR SKIN W/RULR ULTRAFINETP

## (undated) DEVICE — SUT ETHIB 2/0 CV V7 D/A 30IN X987H

## (undated) DEVICE — SYR TB SLPTP 1CC NO NDL

## (undated) DEVICE — ADHS LIQ MASTISOL 2/3ML

## (undated) DEVICE — APPL CHLORAPREP W/TINT 26ML ORNG

## (undated) DEVICE — SUT VIC 5/0 P3 18IN J493G

## (undated) DEVICE — VISUALIZATION SYSTEM: Brand: CLEARIFY

## (undated) DEVICE — SYR LUERLOK 30CC

## (undated) DEVICE — STRIP CLS WND SUTURESTRIP/PLS 0.5X5IN TP1105

## (undated) DEVICE — GAUZE,SPONGE,4"X4",16PLY,XRAY,STRL,LF: Brand: MEDLINE

## (undated) DEVICE — SUT ETHIB 0/0 SH 60IN D8724

## (undated) DEVICE — APPL CHLORAPREP HI/LITE 26ML ORNG

## (undated) DEVICE — NEEDLE, QUINCKE, 22GX5": Brand: MEDLINE

## (undated) DEVICE — TRAP,MUCUS SPECIMEN, 80CC: Brand: MEDLINE

## (undated) DEVICE — CROUCH CORNEAL PROTECTOR - PEDIATRIC: Brand: BAUSCH + LOMB

## (undated) DEVICE — STPLR SKIN VISISTAT WD 35CT

## (undated) DEVICE — SUT VIC 6/0 P1 18IN J489G

## (undated) DEVICE — VAGINAL PACKING: Brand: DEROYAL

## (undated) DEVICE — ENCORE® LATEX ORTHO SIZE 6.5, STERILE LATEX POWDER-FREE SURGICAL GLOVE: Brand: ENCORE

## (undated) DEVICE — SUT ETHLN 6/0 P3 18IN 1698G

## (undated) DEVICE — STERILE COTTON BALLS LARGE 5/P: Brand: MEDLINE

## (undated) DEVICE — LUER-LOK 360°: Brand: CONNECTA, LUER-LOK

## (undated) DEVICE — ELECTRD BLD EZ CLN MOD XLNG 2.75IN

## (undated) DEVICE — SUT ETHLN 5/0 PC3 PLSTC 18IN 1865G